# Patient Record
Sex: MALE | Race: WHITE | NOT HISPANIC OR LATINO | Employment: OTHER | URBAN - METROPOLITAN AREA
[De-identification: names, ages, dates, MRNs, and addresses within clinical notes are randomized per-mention and may not be internally consistent; named-entity substitution may affect disease eponyms.]

---

## 2017-01-04 ENCOUNTER — HOSPITAL ENCOUNTER (OUTPATIENT)
Dept: CT IMAGING | Facility: HOSPITAL | Age: 72
Discharge: HOME | End: 2017-01-04
Payer: MEDICARE

## 2017-01-04 DIAGNOSIS — R93.7 ABNORMAL FINDINGS ON DIAGNOSTIC IMAGING OF OTHER PARTS OF MUSCULOSKELETAL SYSTEM: ICD-10-CM

## 2017-01-04 DIAGNOSIS — Z91.81 HISTORY OF FALLING: ICD-10-CM

## 2017-01-04 DIAGNOSIS — R10.9 ABDOMINAL PAIN: ICD-10-CM

## 2017-01-04 PROCEDURE — 74176 CT ABD & PELVIS W/O CONTRAST: CPT

## 2017-01-05 ENCOUNTER — GENERIC CONVERSION - ENCOUNTER (OUTPATIENT)
Dept: OTHER | Facility: OTHER | Age: 72
End: 2017-01-05

## 2017-01-19 ENCOUNTER — ALLSCRIPTS OFFICE VISIT (OUTPATIENT)
Dept: OTHER | Facility: OTHER | Age: 72
End: 2017-01-19

## 2017-01-26 ENCOUNTER — ALLSCRIPTS OFFICE VISIT (OUTPATIENT)
Dept: OTHER | Facility: OTHER | Age: 72
End: 2017-01-26

## 2017-02-10 ENCOUNTER — APPOINTMENT (OUTPATIENT)
Dept: LAB | Facility: CLINIC | Age: 72
End: 2017-02-10
Payer: MEDICARE

## 2017-02-10 DIAGNOSIS — R73.09 OTHER ABNORMAL GLUCOSE: ICD-10-CM

## 2017-02-10 DIAGNOSIS — E78.5 HYPERLIPIDEMIA: ICD-10-CM

## 2017-02-10 LAB
ALBUMIN SERPL BCP-MCNC: 4.3 G/DL (ref 3.5–5)
ALP SERPL-CCNC: 59 U/L (ref 46–116)
ALT SERPL W P-5'-P-CCNC: 26 U/L (ref 12–78)
ANION GAP SERPL CALCULATED.3IONS-SCNC: 6 MMOL/L (ref 4–13)
AST SERPL W P-5'-P-CCNC: 20 U/L (ref 5–45)
BILIRUB SERPL-MCNC: 0.42 MG/DL (ref 0.2–1)
BUN SERPL-MCNC: 20 MG/DL (ref 5–25)
CALCIUM SERPL-MCNC: 9.1 MG/DL (ref 8.3–10.1)
CHLORIDE SERPL-SCNC: 102 MMOL/L (ref 100–108)
CHOLEST SERPL-MCNC: 197 MG/DL (ref 50–200)
CO2 SERPL-SCNC: 30 MMOL/L (ref 21–32)
CREAT SERPL-MCNC: 1.08 MG/DL (ref 0.6–1.3)
EST. AVERAGE GLUCOSE BLD GHB EST-MCNC: 128 MG/DL
GFR SERPL CREATININE-BSD FRML MDRD: >60 ML/MIN/1.73SQ M
GLUCOSE SERPL-MCNC: 94 MG/DL (ref 65–140)
HBA1C MFR BLD: 6.1 % (ref 4.2–6.3)
HDLC SERPL-MCNC: 51 MG/DL (ref 40–60)
LDLC SERPL CALC-MCNC: 125 MG/DL (ref 0–100)
POTASSIUM SERPL-SCNC: 4.4 MMOL/L (ref 3.5–5.3)
PROT SERPL-MCNC: 8 G/DL (ref 6.4–8.2)
SODIUM SERPL-SCNC: 138 MMOL/L (ref 136–145)
TRIGL SERPL-MCNC: 104 MG/DL

## 2017-02-10 PROCEDURE — 36415 COLL VENOUS BLD VENIPUNCTURE: CPT

## 2017-02-10 PROCEDURE — 80061 LIPID PANEL: CPT

## 2017-02-10 PROCEDURE — 83036 HEMOGLOBIN GLYCOSYLATED A1C: CPT

## 2017-02-10 PROCEDURE — 80053 COMPREHEN METABOLIC PANEL: CPT

## 2017-02-14 ENCOUNTER — ALLSCRIPTS OFFICE VISIT (OUTPATIENT)
Dept: OTHER | Facility: OTHER | Age: 72
End: 2017-02-14

## 2017-06-01 DIAGNOSIS — I10 ESSENTIAL (PRIMARY) HYPERTENSION: ICD-10-CM

## 2017-06-01 DIAGNOSIS — R73.09 OTHER ABNORMAL GLUCOSE: ICD-10-CM

## 2017-06-01 DIAGNOSIS — E78.5 HYPERLIPIDEMIA: ICD-10-CM

## 2017-06-06 ENCOUNTER — APPOINTMENT (OUTPATIENT)
Dept: LAB | Facility: CLINIC | Age: 72
End: 2017-06-06
Payer: MEDICARE

## 2017-06-06 DIAGNOSIS — I10 ESSENTIAL (PRIMARY) HYPERTENSION: ICD-10-CM

## 2017-06-06 DIAGNOSIS — E78.5 HYPERLIPIDEMIA: ICD-10-CM

## 2017-06-06 DIAGNOSIS — R73.09 OTHER ABNORMAL GLUCOSE: ICD-10-CM

## 2017-06-06 LAB
ALBUMIN SERPL BCP-MCNC: 4.2 G/DL (ref 3.5–5)
ALP SERPL-CCNC: 66 U/L (ref 46–116)
ALT SERPL W P-5'-P-CCNC: 24 U/L (ref 12–78)
ANION GAP SERPL CALCULATED.3IONS-SCNC: 9 MMOL/L (ref 4–13)
AST SERPL W P-5'-P-CCNC: 18 U/L (ref 5–45)
BASOPHILS # BLD AUTO: 0.03 THOUSANDS/ΜL (ref 0–0.1)
BASOPHILS NFR BLD AUTO: 1 % (ref 0–1)
BILIRUB SERPL-MCNC: 0.47 MG/DL (ref 0.2–1)
BUN SERPL-MCNC: 20 MG/DL (ref 5–25)
CALCIUM SERPL-MCNC: 9.4 MG/DL (ref 8.3–10.1)
CHLORIDE SERPL-SCNC: 98 MMOL/L (ref 100–108)
CO2 SERPL-SCNC: 30 MMOL/L (ref 21–32)
CREAT SERPL-MCNC: 1.15 MG/DL (ref 0.6–1.3)
EOSINOPHIL # BLD AUTO: 0.33 THOUSAND/ΜL (ref 0–0.61)
EOSINOPHIL NFR BLD AUTO: 6 % (ref 0–6)
ERYTHROCYTE [DISTWIDTH] IN BLOOD BY AUTOMATED COUNT: 13 % (ref 11.6–15.1)
EST. AVERAGE GLUCOSE BLD GHB EST-MCNC: 126 MG/DL
GFR SERPL CREATININE-BSD FRML MDRD: >60 ML/MIN/1.73SQ M
GLUCOSE P FAST SERPL-MCNC: 89 MG/DL (ref 65–99)
HBA1C MFR BLD: 6 % (ref 4.2–6.3)
HCT VFR BLD AUTO: 42.1 % (ref 36.5–49.3)
HGB BLD-MCNC: 14 G/DL (ref 12–17)
LYMPHOCYTES # BLD AUTO: 1.83 THOUSANDS/ΜL (ref 0.6–4.47)
LYMPHOCYTES NFR BLD AUTO: 31 % (ref 14–44)
MCH RBC QN AUTO: 32.1 PG (ref 26.8–34.3)
MCHC RBC AUTO-ENTMCNC: 33.3 G/DL (ref 31.4–37.4)
MCV RBC AUTO: 97 FL (ref 82–98)
MONOCYTES # BLD AUTO: 0.51 THOUSAND/ΜL (ref 0.17–1.22)
MONOCYTES NFR BLD AUTO: 9 % (ref 4–12)
NEUTROPHILS # BLD AUTO: 3.24 THOUSANDS/ΜL (ref 1.85–7.62)
NEUTS SEG NFR BLD AUTO: 53 % (ref 43–75)
NRBC BLD AUTO-RTO: 0 /100 WBCS
PLATELET # BLD AUTO: 301 THOUSANDS/UL (ref 149–390)
PMV BLD AUTO: 11.2 FL (ref 8.9–12.7)
POTASSIUM SERPL-SCNC: 4.1 MMOL/L (ref 3.5–5.3)
PROT SERPL-MCNC: 7.6 G/DL (ref 6.4–8.2)
RBC # BLD AUTO: 4.36 MILLION/UL (ref 3.88–5.62)
SODIUM SERPL-SCNC: 137 MMOL/L (ref 136–145)
TSH SERPL DL<=0.05 MIU/L-ACNC: 2.18 UIU/ML (ref 0.36–3.74)
WBC # BLD AUTO: 5.96 THOUSAND/UL (ref 4.31–10.16)

## 2017-06-06 PROCEDURE — 83036 HEMOGLOBIN GLYCOSYLATED A1C: CPT

## 2017-06-06 PROCEDURE — 85025 COMPLETE CBC W/AUTO DIFF WBC: CPT

## 2017-06-06 PROCEDURE — 36415 COLL VENOUS BLD VENIPUNCTURE: CPT

## 2017-06-06 PROCEDURE — 84443 ASSAY THYROID STIM HORMONE: CPT

## 2017-06-06 PROCEDURE — 80053 COMPREHEN METABOLIC PANEL: CPT

## 2017-06-14 ENCOUNTER — ALLSCRIPTS OFFICE VISIT (OUTPATIENT)
Dept: OTHER | Facility: OTHER | Age: 72
End: 2017-06-14

## 2017-10-02 DIAGNOSIS — E78.5 HYPERLIPIDEMIA: ICD-10-CM

## 2017-10-02 DIAGNOSIS — R73.03 PREDIABETES: ICD-10-CM

## 2017-10-05 ENCOUNTER — APPOINTMENT (OUTPATIENT)
Dept: LAB | Facility: CLINIC | Age: 72
End: 2017-10-05
Payer: MEDICARE

## 2017-10-05 DIAGNOSIS — R73.03 PREDIABETES: ICD-10-CM

## 2017-10-05 DIAGNOSIS — E78.5 HYPERLIPIDEMIA: ICD-10-CM

## 2017-10-05 LAB
ANION GAP SERPL CALCULATED.3IONS-SCNC: 5 MMOL/L (ref 4–13)
BUN SERPL-MCNC: 19 MG/DL (ref 5–25)
CALCIUM SERPL-MCNC: 8.8 MG/DL (ref 8.3–10.1)
CHLORIDE SERPL-SCNC: 105 MMOL/L (ref 100–108)
CHOLEST SERPL-MCNC: 165 MG/DL (ref 50–200)
CO2 SERPL-SCNC: 29 MMOL/L (ref 21–32)
CREAT SERPL-MCNC: 1.11 MG/DL (ref 0.6–1.3)
GFR SERPL CREATININE-BSD FRML MDRD: 66 ML/MIN/1.73SQ M
GLUCOSE P FAST SERPL-MCNC: 90 MG/DL (ref 65–99)
HDLC SERPL-MCNC: 46 MG/DL (ref 40–60)
LDLC SERPL CALC-MCNC: 91 MG/DL (ref 0–100)
POTASSIUM SERPL-SCNC: 4.2 MMOL/L (ref 3.5–5.3)
SODIUM SERPL-SCNC: 139 MMOL/L (ref 136–145)
TRIGL SERPL-MCNC: 140 MG/DL

## 2017-10-05 PROCEDURE — 80048 BASIC METABOLIC PNL TOTAL CA: CPT

## 2017-10-05 PROCEDURE — 83036 HEMOGLOBIN GLYCOSYLATED A1C: CPT

## 2017-10-05 PROCEDURE — 80061 LIPID PANEL: CPT

## 2017-10-05 PROCEDURE — 36415 COLL VENOUS BLD VENIPUNCTURE: CPT

## 2017-10-06 LAB
EST. AVERAGE GLUCOSE BLD GHB EST-MCNC: 134 MG/DL
HBA1C MFR BLD: 6.3 % (ref 4.2–6.3)

## 2017-10-10 ENCOUNTER — ALLSCRIPTS OFFICE VISIT (OUTPATIENT)
Dept: OTHER | Facility: OTHER | Age: 72
End: 2017-10-10

## 2017-10-11 NOTE — PROGRESS NOTES
Assessment  1  Obesity (278 00) (E66 9)   2  Hypertension (401 9) (I10)   3  Hyperlipidemia (272 4) (E78 5)   4  GERD (gastroesophageal reflux disease) (530 81) (K21 9)   5  Prediabetes (790 29) (R73 03)    Plan  GERD (gastroesophageal reflux disease)    · Omeprazole 20 MG Oral Capsule Delayed Release; TAKE ONE CAPSULE BY MOUTH EVERY  DAY  Hypertension    · (1) BASIC METABOLIC PROFILE; Status:Active; Requested for:01Feb2018;   Need for hepatitis C screening test    · (Q) HEPATITIS C ANTIBODY; Status:Active; Requested for:10Oct2017;   Need for prophylactic vaccination and inoculation against influenza    · Fluzone High-Dose 0 5 ML Intramuscular Suspension Prefilled Syringe  Prediabetes    · (1) HEMOGLOBIN A1C; Status:Active; Requested for:01Feb2018; Discussion/Summary  Discussion Summary:   Cough  No improvement with steroid nasal spray, suspect GERD  Start low dose PPI qHS, discussed diet  Prediabetes, obesity  A1c worse, limit carbs, portion control  HTN  BP stable on lisinopril  Hyperlipidemia  LDL improved, no dietary or medication changed  Menieres, occasional dizziness  On daily diuretics  s/p bursa removal HM  Flu vaccine today  Repeat colonoscopy age [de-identified]   up in 4 months or prn  Counseling Documentation With Imm: The patient was counseled regarding diagnostic results,-instructions for management,-risk factor reductions,-impressions  Chief Complaint  Chief Complaint Chronic Condition [facility]: Patient is here today for follow up of chronic conditions described in HPI  History of Present Illness  HPI: Mr Greer Due complains of a cough, worse at night  would sometimes wake him up  He tried elevating the pillows, still has a non productive cough  Minimal symptoms during the day  He used the nasal spray at night which did not help  Denies any post nasal drip, nasal congestion or wheezing  has the thick bursa removed from his right knee   This has healed well has been more active this summer, busy every day     Obesity (Follow-Up): The patient is being seen for follow-up of obesity  The patient reports no change in the condition  Interval symptoms:  stable poor eating habits,-denies dyspnea,-denies fatigue-and-denies back pain  The patient is not currently on medication for this problem  Disease management:  the patient is not doing well with his goals  Gastroesophageal Reflux Disease (Brief): The patient is being seen for an initial evaluation of gastroesophageal reflux disease  Symptoms:  no heartburn,-no epigastric pain-and-no abdominal pain  The patient is currently experiencing symptoms  Associated symptoms:  cough, but-no hoarseness-and-no wheezing  Hyperlipidemia (Follow-Up): The patient states his hyperlipidemia has been under good control since the last visit  Comorbid Illnesses: hypertension  Symptoms: The patient is currently asymptomatic  Medications: the patient is adherent with his medication regimen  The patient is doing well with his hyperlipidemia goals  Review of Systems  Complete-Male:   Constitutional: not feeling poorly-and-not feeling tired  Eyes: no eyesight problems  Cardiovascular: no chest pain,-no palpitations-and-no extremity edema  Respiratory: cough, but-as noted in HPI,-no shortness of breath-and-no wheezing  Gastrointestinal: no abdominal pain-and-no constipation  Genitourinary: nocturia-and-1-2x at night, but-no urinary hesitancy  Musculoskeletal: no arthralgias  Integumentary: as noted in HPI,-no rashes-and-no skin wound  Neurological: no headache-and-no dizziness  Psychiatric: no sleep disturbances  Endocrine: no feelings of weakness  Active Problems  1  Abnormal x-ray of spine (793 7) (R93 7)   2  Colonoscopy (Fiberoptic) Screening   3  Cough (786 2) (R05)   4  Essential hypertriglyceridemia (272 1) (E78 1)   5  Hemorrhoids (455 6) (K64 9)   6  History of fall (V15 88) (Z91 81)   7  Hyperlipidemia (272 4) (E78 5)   8   Hypertension (401 9) (I10)   9  MÃ©niÃ¨re's disease (386 00) (H81 09)   10  Need for chickenpox vaccination (V05 4) (Z23)   11  Need for pneumococcal vaccination (V03 82) (Z23)   12  Need for prophylactic vaccination and inoculation against influenza (V04 81) (Z23)   13  Obesity (278 00) (E66 9)   14  Prediabetes (790 29) (R73 03)   15  Screening for neurological condition (V80 09) (Z13 89)   16  Skin lesion (709 9) (L98 9)   17  Special screening examination for neoplasm of prostate (V76 44) (Z12 5)   18  Special screening for malignant neoplasm of colon (V76 51) (Z12 11)    Past Medical History  1  History of Bronchitis, asthmatic (493 90) (J45 909)   2  History of Diverticulosis (562 10) (K57 90)   3  History of fall (V15 88) (Z91 81)   4  History of low back pain (V13 59) (Z87 39)   5  History of right flank pain (V13 89) (Z87 898)   6  History of sebaceous cyst (V13 3) (Z87 2)   7  History of Impacted cerumen of both ears (380 4) (H61 23)   8  History of Internal Hemorrhoids (455 0)   9  History of Lower Leg Localized Swelling Unilateral (729 81)   10  Need for chickenpox vaccination (V05 4) (Z23)   11  History of Pneumonia (V12 61)   12  History of Strain of lumbar region, initial encounter (847 2) (C41 397L)  Active Problems And Past Medical History Reviewed: The active problems and past medical history were reviewed and updated today  Surgical History  1  History of Enteroscopic Polypectomy   2  History of Excision Of Prepatellar Bursa   3  History of Nose Surgery   4  History of Tonsillectomy    Family History  Mother    1  Family history of Acute Myocardial Infarction (V17 3)   2  Denied: Family history of Alcoholism and drug addiction in family   3  Denied: Family history of Anxiety and depression   4  Denied: Family history of Colon cancer   5  Denied: Family history of Crohn's disease without complication, unspecified gastrointestinal tract   location   6  Denied: Family history of liver disease   7   Family history of Mother  At Age 78  Father    6  Denied: Family history of Alcoholism and drug addiction in family   5  Denied: Family history of Anxiety and depression   10  Denied: Family history of Colon cancer   11  Denied: Family history of Crohn's disease without complication, unspecified gastrointestinal tract    location   15  Denied: Family history of liver disease   15  Family history of Father  At Age 80   17  Family history of Septicemia  Child    13  Denied: Family history of Alcoholism and drug addiction in family   12  Denied: Family history of Anxiety and depression  Sibling    16  Denied: Family history of Alcoholism and drug addiction in family   25  Denied: Family history of Anxiety and depression    Social History   · Being A Social Drinker   · Marital History - Currently    · Never A Smoker   · Occupation: Retired   · Denied: History of Using Intravenous Drugs  Social History Reviewed: The social history was reviewed and is unchanged  Current Meds   1  Aspirin 81 MG TABS; take 2 tablet daily; Therapy: 23KNV2365 to (Evaluate:31Gzi2302); Last Rx:23Fhn5015 Ordered   2  Fish Oil 1200 MG Oral Capsule; Take 1 capsule twice daily; Therapy: 44SZO9494 to (Evaluate:2013); Last Rx:2013 Ordered   3  Fluticasone Propionate 50 MCG/ACT Nasal Suspension; Use 1-2 sprays in each nostril once daily; Therapy: 70LBV4742 to (Last Rx:2017)  Requested for: 13TAA0875 Ordered   4  Gemfibrozil 600 MG Oral Tablet; TAKE 1 TABLET TWICE DAILY; Therapy: 99CCK2589 to (Laura Hurley)  Requested for: 25JXG6611; Last Rx:55Buj3802   Ordered   5  Lisinopril 10 MG Oral Tablet; TAKE 1 TABLET DAILY; Therapy: 63HMS3779 to (Evaluate:2018)  Requested for: 01XEB5565; Last Rx:29Txu0692   Ordered   6  Multi-Vitamin Oral Tablet; Take 1 tablet daily; Therapy: 97UHH3872 to (Last Rx:01Aqv5145) Ordered   7  Pravastatin Sodium 10 MG Oral Tablet; TAKE 1 TABLET DAILY AS DIRECTED;    Therapy: 43TDG9085 to (Evaluate:67Qpr6125)  Requested for: 39OFX9941; Last Rx:96Izr6776   Ordered   8  Triamterene-HCTZ 37 5-25 MG Oral Capsule; TAKE 1 CAPSULE Daily; Therapy: 00GQI7036 to ()  Requested for: 85Hzj8356; Last Rx:05Jjc3864   Ordered  Medication List Reviewed: The medication list was reviewed and updated today  Allergies  1  No Known Drug Allergies    Vitals  Vital Signs    Recorded: 45QVA9302 12:18PM   Temperature 98 1 F   Heart Rate 84   Respiration 18   Systolic 891   Diastolic 76   Height 5 ft 6 in   Weight 232 lb 8 oz   BMI Calculated 37 53   BSA Calculated 2 13   O2 Saturation 97     Physical Exam    Constitutional   General appearance: No acute distress, well appearing and well nourished  appears healthy,-comfortable,-obese,-clothing appropriate-and-well hydrated  Head and Face   Head and face: Normal     Eyes   Pupils and irises: Equal, round, reactive to light  Ears, Nose, Mouth, and Throat   External inspection of ears and nose: Normal     Otoscopic examination: Tympanic membranes translucent with normal light reflex  Canals patent without erythema  Nasal mucosa, septum, and turbinates: Abnormal   no nasal discharge  The bilateral nasal mucosa was red  Oropharynx: Normal with no erythema, edema, exudate or lesions  Neck   Neck: Supple, symmetric, trachea midline, no masses  Pulmonary   Respiratory effort: No increased work of breathing or signs of respiratory distress  Auscultation of lungs: Clear to auscultation  Cardiovascular   Auscultation of heart: Normal rate and rhythm, normal S1 and S2, no murmurs  Examination of extremities for edema and/or varicosities: Normal     Abdomen   Abdomen: Non-tender, no masses  Musculoskeletal   Gait and station: Normal     Skin   Skin and subcutaneous tissue: Normal without rashes or lesions  -well healed wound R knee  Neurologic   Cortical function: Normal mental status      Psychiatric   Orientation to person, place and time: Normal        Results/Data  (1) BASIC METABOLIC PROFILE 84CEL6642 11:20AM [employer] Order Number: [id number]     Test Name Result Flag Reference   SODIUM 139 mmol/L  136-145   POTASSIUM 4 2 mmol/L  3 5-5 3   CHLORIDE 105 mmol/L  100-108   CARBON DIOXIDE 29 mmol/L  21-32   ANION GAP (CALC) 5 mmol/L  4-13   BLOOD UREA NITROGEN 19 mg/dL  5-25   CREATININE 1 11 mg/dL  0 60-1 30   Standardized to IDMS reference method   CALCIUM 8 8 mg/dL  8 3-10 1   eGFR 66 ml/min/1 73sq m     National Kidney Disease Education Program recommendations are as follows:  GFR calculation is accurate only with a steady state creatinine  Chronic Kidney disease less than 60 ml/min/1 73 sq  meters  Kidney failure less than 15 ml/min/1 73 sq  meters  GLUCOSE FASTING 90 mg/dL  65-99   Specimen collection should occur prior to Sulfasalazine administration due to the potential for falsely depressed results  Specimen collection should occur prior to Sulfapyridine administration due to the potential for falsely elevated results  (1) LIPID PANEL, FASTING 99CGK8069 11:20AM [employer] Order Number: [id number]     Test Name Result Flag Reference   CHOLESTEROL 165 mg/dL     HDL,DIRECT 46 mg/dL  40-60   Specimen collection should occur prior to Metamizole administration due to the potential for falsley depressed results  LDL CHOLESTEROL CALCULATED 91 mg/dL  0-100   Triglyceride:        Normal <150 mg/dl   Borderline High 150-199 mg/dl   High 200-499 mg/dl   Very High >499 mg/dl      Cholesterol:       Desirable <200 mg/dl    Borderline High 200-239 mg/dl    High >239 mg/dl      HDL Cholesterol:       High>59 mg/dL    Low <41 mg/dL      This screening LDL is a calculated result  It does not have the accuracy of the Direct Measured LDL in the monitoring of patients with hyperlipidemia and/or statin therapy     Direct Measure LDL (PTK544) must be ordered separately in these patients  TRIGLYCERIDES 140 mg/dL  <=150   Specimen collection should occur prior to N-Acetylcysteine or Metamizole administration due to the potential for falsely depressed results  (1) HEMOGLOBIN A1C 05Oct2017 11:20AM Brian Jono Order Number: [id number]     Test Name Result Flag Reference   HEMOGLOBIN A1C 6 3 %  4 2-6 3   EST  AVG  GLUCOSE 134 mg/dl       Health Management  Special screening for malignant neoplasm of colon   COLONOSCOPY; every 10 years; Last 30EWC2383; Next Due: 84ACB1427;  Active    Future Appointments    Date/Time Provider Specialty Site   02/13/2018 12:30 PM Angi Mckee MD Internal Medicine [facility] INTERNAL MED     Signatures   Electronically signed by : Camilo Bowman MD; Oct 10 2017  3:08PM EST                       (Author)

## 2017-12-04 ENCOUNTER — GENERIC CONVERSION - ENCOUNTER (OUTPATIENT)
Dept: OTHER | Facility: OTHER | Age: 72
End: 2017-12-04

## 2018-01-04 ENCOUNTER — APPOINTMENT (OUTPATIENT)
Dept: RADIOLOGY | Facility: CLINIC | Age: 73
End: 2018-01-04
Payer: MEDICARE

## 2018-01-04 ENCOUNTER — TRANSCRIBE ORDERS (OUTPATIENT)
Dept: RADIOLOGY | Facility: CLINIC | Age: 73
End: 2018-01-04

## 2018-01-04 DIAGNOSIS — R05.9 COUGH: ICD-10-CM

## 2018-01-04 PROCEDURE — 71046 X-RAY EXAM CHEST 2 VIEWS: CPT

## 2018-01-05 ENCOUNTER — GENERIC CONVERSION - ENCOUNTER (OUTPATIENT)
Dept: OTHER | Facility: OTHER | Age: 73
End: 2018-01-05

## 2018-01-10 NOTE — PROGRESS NOTES
Chief Complaint  patient is here for a BP check  pt is to continue the lisinopril and keep his appt next month per Dr Michael Miramontes  pt notified      Active Problems    1  Abnormal x-ray of spine (793 7) (R93 7)   2  Bronchitis, asthmatic (493 90) (J45 909)   3  Colonoscopy (Fiberoptic) Screening   4  Ear pain, left (388 70) (H92 02)   5  Essential hypertriglyceridemia (272 1) (E78 1)   6  Hemorrhoids (455 6) (K64 9)   7  History of fall (V15 88) (Z91 81)   8  Hyperlipidemia (272 4) (E78 5)   9  Hypertension (401 9) (I10)   10  MeniÃ¨re's disease (386 00) (H81 09)   11  Need for chickenpox vaccination (V05 4) (Z23)   12  Need for pneumococcal vaccination (V03 82) (Z23)   13  Need for prophylactic vaccination and inoculation against influenza (V04 81) (Z23)   14  Obesity (278 00) (E66 9)   15  Prediabetes (790 29) (R73 09)   16  Right flank pain (789 09) (R10 9)   17  Screening for depression (V79 0) (Z13 89)   18  Screening for genitourinary condition (V81 6) (Z13 89)   19  Screening for neurological condition (V80 09) (Z13 89)   20  Skin lesion (709 9) (L98 9)   21  Special screening examination for neoplasm of prostate (V76 44) (Z12 5)   22  Special screening for malignant neoplasm of colon (V76 51) (Z12 11)   23  Strain of lumbar region, initial encounter (847 2) (S39 012A)    Current Meds   1  Acetaminophen-Codeine #3 300-30 MG Oral Tablet; TAKE 1 TABLET EVERY 6 TO 8   HOURS AS NEEDED FOR PAIN, MAY CAUSE DROWSINESS; Therapy: 60Tru8579 to (Last Rx:26Dde6219) Ordered   2  Aspirin 81 MG TABS; take 2 tablet daily; Therapy: 18SFK0581 to (Evaluate:37Qkc3176); Last Rx:92Hbg4812 Ordered   3  Fish Oil 1200 MG Oral Capsule; Take 1 capsule twice daily; Therapy: 43FIM8694 to (Evaluate:34Eyg0635); Last Rx:39Hlo6595 Ordered   4  Gemfibrozil 600 MG Oral Tablet; TAKE 1 TABLET TWICE DAILY; Therapy: 96PIM7904 to (Evaluate:87Lmn6902)  Requested for: 11WIE2252; Last   Rx:64Ncq6156 Ordered   5   Lisinopril 10 MG Oral Tablet; TAKE 1 TABLET DAILY; Therapy: 71KMX6818 to (Evaluate:20Mar2017)  Requested for: 34NOW6730; Last   Rx:19Jan2017 Ordered   6  Multi-Vitamin Oral Tablet; Take 1 tablet daily; Therapy: 60JPM8762 to (Last Rx:60Yen7449) Ordered   7  Pravastatin Sodium 10 MG Oral Tablet; TAKE 1 TABLET DAILY AS DIRECTED; Therapy: 08MZH9844 to (Evaluate:78Kia3620)  Requested for: 14JXO9904; Last   Rx:19Jan2017 Ordered   8  Triamterene-HCTZ 37 5-25 MG Oral Capsule; TAKE 1 CAPSULE Daily; Therapy: 63JZO5549 to (Evaluate:22Jan2017)  Requested for: 22Rxe7631; Last   Rx:25Czp3138 Ordered    Allergies    1   No Known Drug Allergies    Vitals  Signs    Heart Rate: 74  Systolic: 851  Diastolic: 78   Systolic: 165  Diastolic: 78    Future Appointments    Date/Time Provider Specialty Site   02/14/2017 01:30 PM Frank Mckee MD Internal Medicine [facility] INTERNAL MED     Signatures   Electronically signed by : Deo Watson MD; Jan 26 2017  5:23PM EST                       (Author)

## 2018-01-12 NOTE — RESULT NOTES
Message   CT scan test results are back and there is no kidney stone  There are several small gallstones in gallbladder which was probably what radiologist saw on the previous x-rays  if patient is feeling better from back pain, no further work up is recommended at this time  Let me know, thanks     Verified Results  CT RENAL STONE STUDY ABDOMEN PELVIS WO CONTRAST 66LTI8668 07:23PM Gregorio Davila Order Number: [id number]   Performing Comments: 69 y/o M s/p recent fall and right flank pain - l-spine series shows right sided 6mm kidney stone   *please complete KIDNEY STONE protocol CT/NO IV CONTRAST, thanks*   - Patient Instructions: To schedule this appointment, please contact Central Scheduling at [phone]  Test Name Result Flag Reference   CT RENAL STONE STUDY ABDOMEN PELVIS WO CONTRAST (Report)     CT ABDOMEN AND PELVIS WITHOUT IV CONTRAST - LOW DOSE RENAL STONE      INDICATION: Right-sided flank pain  Right renal calculus suspected on prior lumbar spine x-rays      COMPARISON: Lumbar spine x-rays 12/28/2016, no prior CT studies     TECHNIQUE: Low dose thin section CT examination of the abdomen and pelvis was performed without intravenous or oral contrast according to a protocol specifically designed to evaluate for urinary tract calculus  Axial, sagittal and coronal reformatted    images were submitted for interpretation  This examination, like all CT scans performed in the [facility], was performed utilizing techniques to minimize radiation dose exposure, including the use of iterative reconstruction and    automated exposure control  Evaluation for pathology in the abdomen and pelvis that is unrelated to urinary tract calculi is limited  FINDINGS:     RIGHT KIDNEY AND URETER:   No urinary tract calculi  No hydronephrosis or hydroureter  No perinephric collection  LEFT KIDNEY AND URETER:   No urinary tract calculi  No hydronephrosis or hydroureter   No perinephric collection  URINARY BLADDER:   Unremarkable  No significant abnormality in the visualized lung bases  Limited low radiation dose noncontrast CT evaluation demonstrates no clinically significant abnormality of liver, spleen, pancreas, or adrenal glands  There are multiple small gallstones, one of these stones likely accounts for the calcification seen on the x-ray study  There is no pericholecystic inflammatory change, gallbladder distention or ductal dilatation   No bowel obstruction  No ascites or lymphadenopathy  Limited evaluation demonstrates no evidence to suggest acute appendicitis  No acute fracture or destructive osseous lesion is identified  IMPRESSION:       1  Cholelithiasis  This likely accounts for the calcification seen on the prior lumbar spine x-rays  No evidence of acute cholecystitis   2  There are no urinary tract calculi  There is no hydronephrosis          Workstation performed: [id number]     Signed by:   Fab Shepherd MD   1/5/17

## 2018-01-13 VITALS
RESPIRATION RATE: 18 BRPM | BODY MASS INDEX: 36.96 KG/M2 | HEART RATE: 90 BPM | OXYGEN SATURATION: 98 % | DIASTOLIC BLOOD PRESSURE: 78 MMHG | SYSTOLIC BLOOD PRESSURE: 124 MMHG | TEMPERATURE: 98.9 F | HEIGHT: 66 IN | WEIGHT: 230 LBS

## 2018-01-13 VITALS
HEART RATE: 80 BPM | DIASTOLIC BLOOD PRESSURE: 68 MMHG | OXYGEN SATURATION: 95 % | SYSTOLIC BLOOD PRESSURE: 130 MMHG | BODY MASS INDEX: 37.63 KG/M2 | HEIGHT: 66 IN | WEIGHT: 234.13 LBS | RESPIRATION RATE: 18 BRPM

## 2018-01-13 VITALS
BODY MASS INDEX: 37.37 KG/M2 | SYSTOLIC BLOOD PRESSURE: 126 MMHG | WEIGHT: 232.5 LBS | RESPIRATION RATE: 18 BRPM | DIASTOLIC BLOOD PRESSURE: 76 MMHG | OXYGEN SATURATION: 97 % | TEMPERATURE: 98.1 F | HEART RATE: 84 BPM | HEIGHT: 66 IN

## 2018-01-13 VITALS — SYSTOLIC BLOOD PRESSURE: 140 MMHG | DIASTOLIC BLOOD PRESSURE: 78 MMHG | HEART RATE: 74 BPM

## 2018-01-13 NOTE — PROGRESS NOTES
Chief Complaint  patient is here for a BP check      Active Problems    1  Abnormal x-ray of spine (793 7) (R93 7)   2  Bronchitis, asthmatic (493 90) (J45 909)   3  Colonoscopy (Fiberoptic) Screening   4  Ear pain, left (388 70) (H92 02)   5  Essential hypertriglyceridemia (272 1) (E78 1)   6  Hemorrhoids (455 6) (K64 9)   7  History of fall (V15 88) (Z91 81)   8  Hyperlipidemia (272 4) (E78 5)   9  Hypertension (401 9) (I10)   10  MeniÃ¨re's disease (386 00) (H81 09)   11  Need for chickenpox vaccination (V05 4) (Z23)   12  Need for pneumococcal vaccination (V03 82) (Z23)   13  Need for prophylactic vaccination and inoculation against influenza (V04 81) (Z23)   14  Obesity (278 00) (E66 9)   15  Prediabetes (790 29) (R73 09)   16  Right flank pain (789 09) (R10 9)   17  Screening for depression (V79 0) (Z13 89)   18  Screening for genitourinary condition (V81 6) (Z13 89)   19  Screening for neurological condition (V80 09) (Z13 89)   20  Skin lesion (709 9) (L98 9)   21  Special screening examination for neoplasm of prostate (V76 44) (Z12 5)   22  Special screening for malignant neoplasm of colon (V76 51) (Z12 11)   23  Strain of lumbar region, initial encounter (847 2) (S39 012A)    Current Meds   1  Acetaminophen-Codeine #3 300-30 MG Oral Tablet; TAKE 1 TABLET EVERY 6 TO 8   HOURS AS NEEDED FOR PAIN, MAY CAUSE DROWSINESS; Therapy: 20Bnk5482 to (Last Rx:87Dpk1429) Ordered   2  Aspirin 81 MG TABS; take 2 tablet daily; Therapy: 86MDW8520 to (Evaluate:68Hvw4033); Last Rx:70Mwe1516 Ordered   3  Fish Oil 1200 MG Oral Capsule; Take 1 capsule twice daily; Therapy: 19HXV3735 to (Evaluate:02Tma0210); Last Rx:00Tbo4034 Ordered   4  Gemfibrozil 600 MG Oral Tablet; TAKE 1 TABLET TWICE DAILY; Therapy: 35KDY4291 to (Evaluate:83Lgu4667)  Requested for: 86KXV4612; Last   Rx:16Nov2016 Ordered   5  Multi-Vitamin Oral Tablet; Take 1 tablet daily; Therapy: 55RJK5604 to (Last Rx:31May2013) Ordered   6   Pravastatin Sodium 10 MG Oral Tablet; TAKE 1 TABLET DAILY AS DIRECTED; Therapy: 88YKA9415 to (Evaluate:22Jan2017)  Requested for: 74Kbr2705; Last   Rx:31Sba9635 Ordered   7  Triamterene-HCTZ 37 5-25 MG Oral Capsule; TAKE 1 CAPSULE Daily; Therapy: 99ZAU8555 to (Evaluate:22Jan2017)  Requested for: 99Sqv9733; Last   Rx:01Pnt0534 Ordered    Allergies    1  No Known Drug Allergies    Vitals  Signs    Heart Rate: 78  Systolic: 982  Diastolic: 86   Systolic: 237  Diastolic: 90    Plan  Hyperlipidemia    · Pravastatin Sodium 10 MG Oral Tablet; TAKE 1 TABLET DAILY AS DIRECTED  Hypertension    · Lisinopril 10 MG Oral Tablet; TAKE 1 TABLET DAILY    Discussion/Summary    Repeat BP remains elevated  start lisinopril 10 mg  BP check in 1 week        Future Appointments    Date/Time Provider Specialty Site   02/01/2017 01:30 PM Davis Mckee MD Internal Medicine [facility] INTERNAL MED   02/14/2017 01:30 PM Davis Mckee MD Internal Medicine [address] [place] [address] INTERNAL [facility]     Signatures   Electronically signed by : Renetta Mckeon MD; Jan 19 2017 10:36AM EST                       (Author)

## 2018-01-14 NOTE — RESULT NOTES
Verified Results  (1) COMPREHENSIVE METABOLIC PANEL 64ICT1355 31:95YZ Melissa Mckeenareshhi 56 Kidney Disease Education Program recommendations are as follows:  GFR calculation is accurate only with a steady state creatinine  Chronic Kidney disease less than 60 ml/min/1 73 sq  meters  Kidney failure less than 15 ml/min/1 73 sq  meters  Test Name Result Flag Reference   GLUCOSE,RANDM 100 mg/dL     SODIUM 139 mmol/L  136-145   POTASSIUM 4 6 mmol/L  3 5-5 3   CHLORIDE 102 mmol/L  100-108   CARBON DIOXIDE 31 mmol/L  21-32   ANION GAP (CALC) 6 mmol/L  4-13   BLOOD UREA NITROGEN 18 mg/dL  5-25   CREATININE 0 97 mg/dL  0 60-1 30   Standardized to IDMS reference method   CALCIUM 8 9 mg/dL  8 3-10 1   BILI, TOTAL 0 60 mg/dL  0 20-1 00   ALK PHOSPHATAS 61 U/L     ALT (SGPT) 27 U/L  12-78   AST(SGOT) 19 U/L  5-45   ALBUMIN 4 4 g/dL  3 5-5 0   TOTAL PROTEIN 7 3 g/dL  6 4-8 2   eGFR Non-African American      >60 0 ml/min/1 73sq m     (1) LIPID PANEL, FASTING 22Jan2016 10:49AM Shruthi Henry   Triglyceride:         Normal              <150 mg/dl       Borderline High    150-199 mg/dl       High               200-499 mg/dl       Very High          >499 mg/dl  Cholesterol:         Desirable        <200 mg/dl      Borderline High  200-239 mg/dl      High             >239 mg/dl  HDL Cholesterol:        High    >59 mg/dL      Low     <41 mg/dL  LDL CALCULATED:    This screening LDL is a calculated result  It does not have the accuracy of the Direct Measured LDL in the monitoring of patients with hyperlipidemia and/or statin therapy  Direct Measure LDL (AIU207) must be ordered separately in these patients       Test Name Result Flag Reference   CHOLESTEROL 175 mg/dL     HDL,DIRECT 43 mg/dL  40-60   LDL CHOLESTEROL CALCULATED 101 mg/dL H 0-100   TRIGLYCERIDES 157 mg/dL H <=150     (1) HEMOGLOBIN A1C 22Jan2016 10:49AM Louis Mckee   5 7-6 4% impaired fasting glucose  >=6 5% diagnosis of diabetes    Falsely low levels are seen in conditions linked to short RBC life span-  hemolytic anemia, and splenomegaly  Falsely elevated levels are seen in situations where there is an increased production of RBC- receipt of erythropoietin or blood transfusions  Adopted from ADA-Clinical Practice Recommendations     Test Name Result Flag Reference   HEMOGLOBIN A1C 6 0 % H 4 0-5 6   EST  AVG   GLUCOSE 126 mg/dl

## 2018-01-14 NOTE — MISCELLANEOUS
Message  called and spoke to patient    reviewed L-spine x-rays with patient over phone    patient has no hx of kidney stones in past & is urinating fine, no dysuria or hematuria  has been having right flank pain/spasms since fall    no other complaints    plan - CT renal stone protocol ordered   f/u after CT   precautions given during OV           Plan  Abnormal x-ray of spine, History of fall, Right flank pain    · CT RENAL STONE STUDY ABDOMEN PELVIS WO CONTRAST; Status:Hold For -  Scheduling; Requested for:28Dec2016; Abnormal x-ray of spine, Right flank pain    · CT RENAL PROTOCOL; Status:Canceled - Scheduling;     Verified Results  * XR SPINE LUMBAR 2 OR 3 VIEWS INJURY 28Dec2016 12:56PM Tahira Austin    Order Number: [id number]   Performing Comments: 71 y/o M hx of fall down steps - c/o low back pain - r/o bony injury     Test Name Result Flag Reference   XR SPINE LUMBAR 2 OR 3 VIEWS (Report)     LUMBAR SPINE     INDICATION: Patient fell  Right side lower back pain  COMPARISON: None     VIEWS: AP and lateral; 3 images     FINDINGS: No significant scoliosis  Normal lordosis  Grade 1 retrolisthesis L2 on L3 and L3 on L4  Grade 1 anterolisthesis L4 on L5  Anterior syndesmophytes at T12-L1 and L1-L2  Multilevel facet degenerative changes  Degenerative disc disease most pronounced at L5-S1  There is no radiographic evidence of acute fracture or destructive osseous lesion  6 mm calcification in the right midabdomen, possibly in the right kidney or right renal pelvis/proximal ureter? Visualized soft tissues appear unremarkable  IMPRESSION:   Multilevel degenerative changes  No acute fracture or dislocation is seen  Grade 1 retrolisthesis L2 on L3 and L3 on L4  Grade 1 anterolisthesis of L4 on L5    6 mm calcification in the right abdomen, possibly in the right renal collecting system, right renal pelvis, or potentially in the right ureter   If there is any evidence for renal colic: Suggest renal stone protocol CT       Workstation performed: [id number]     Signed by:    Erlinda Avalos DO   12/28/16       Signatures   Electronically signed by : Minoo Brambila DO; Dec 28 2016  6:45PM EST                       (Author)

## 2018-01-15 NOTE — RESULT NOTES
Verified Results  (1) CBC/PLT/DIFF 25Apr2016 11:24AM [facility] Order Number: [id number]    TW Order Number: [id number]     Test Name Result Flag Reference   WBC COUNT 4 90 Thousand/uL  4 31-10 16   RBC COUNT 4 88 Million/uL  3 88-5 62   HEMOGLOBIN 14 9 g/dL  12 0-17 0   HEMATOCRIT 45 8 %  36 5-49 3   MCV 94 fL  82-98   MCH 30 5 pg  26 8-34 3   MCHC 32 5 g/dL  31 4-37 4   RDW 13 0 %  11 6-15 1   MPV 11 0 fL  8 9-12 7   PLATELET COUNT 497 Thousands/uL  149-390   nRBC AUTOMATED 0 /100 WBCs     NEUTROPHILS RELATIVE PERCENT 52 %  43-75   LYMPHOCYTES RELATIVE PERCENT 36 %  14-44   MONOCYTES RELATIVE PERCENT 8 %  4-12   EOSINOPHILS RELATIVE PERCENT 4 %  0-6   BASOPHILS RELATIVE PERCENT 0 %  0-1   NEUTROPHILS ABSOLUTE COUNT 2 50 Thousands/µL  1 85-7 62   LYMPHOCYTES ABSOLUTE COUNT 1 75 Thousands/µL  0 60-4 47   MONOCYTES ABSOLUTE COUNT 0 41 Thousand/µL  0 17-1 22   EOSINOPHILS ABSOLUTE COUNT 0 20 Thousand/µL  0 00-0 61   BASOPHILS ABSOLUTE COUNT 0 02 Thousands/µL  0 00-0 10     (1) COMPREHENSIVE METABOLIC PANEL [id number] 51:17FS [facility] Order Number: [id number]     Order Number: [id number] Order Number: [id number] Order Number: [id number]  National Kidney Disease Education Program recommendations are as follows:  GFR calculation is accurate only with a steady state creatinine  Chronic Kidney disease less than 60 ml/min/1 73 sq  meters  Kidney failure less than 15 ml/min/1 73 sq  meters  Test Name Result Flag Reference   GLUCOSE,RANDM 98 mg/dL     If the patient is fasting, the ADA then defines impaired fasting glucose as > 100 mg/dL and diabetes as > or equal to 123 mg/dL     SODIUM 141 mmol/L  136-145   POTASSIUM 4 4 mmol/L  3 5-5 3   CHLORIDE 104 mmol/L  100-108   CARBON DIOXIDE 30 mmol/L  21-32   ANION GAP (CALC) 7 mmol/L  4-13   BLOOD UREA NITROGEN 19 mg/dL  5-25   CREATININE 0 96 mg/dL  0 60-1 30   Standardized to IDMS reference method   CALCIUM 8 7 mg/dL 8  3-10 1   BILI, TOTAL 0 41 mg/dL  0 20-1 00   ALK PHOSPHATAS 57 U/L     ALT (SGPT) 41 U/L  12-78   AST(SGOT) 29 U/L  5-45   ALBUMIN 4 2 g/dL  3 5-5 0   TOTAL PROTEIN 7 2 g/dL  6 4-8 2   eGFR Non-African American      >60 0 ml/min/1 73sq m     (1) HEMOGLOBIN A1C 25Apr2016 11:24AM Vane Salvage   TW Order Number: [id number]      5 7-6 4% impaired fasting glucose  >=6 5% diagnosis of diabetes    Falsely low levels are seen in conditions linked to short RBC life span-  hemolytic anemia, and splenomegaly  Falsely elevated levels are seen in situations where there is an increased production of RBC- receipt of erythropoietin or blood transfusions  Adopted from ADA-Clinical Practice Recommendations     Test Name Result Flag Reference   HEMOGLOBIN A1C 6 4 % H 4 0-5 6   EST  AVG  GLUCOSE 137 mg/dl       (1) LIPID PANEL, FASTING 25Apr2016 11:24AM Vane Salvage   TW Order Number: [id number]    TW Order Number: [id number] Order Number: [id number] Order Number: [id number]  Triglyceride:         Normal              <150 mg/dl       Borderline High    150-199 mg/dl       High               200-499 mg/dl       Very High          >499 mg/dl  Cholesterol:         Desirable        <200 mg/dl      Borderline High  200-239 mg/dl      High             >239 mg/dl  HDL Cholesterol:        High    >59 mg/dL      Low     <41 mg/dL  LDL CALCULATED:    This screening LDL is a calculated result  It does not have the accuracy of the Direct Measured LDL in the monitoring of patients with hyperlipidemia and/or statin therapy  Direct Measure LDL (YJN300) must be ordered separately in these patients  Test Name Result Flag Reference   CHOLESTEROL 202 mg/dL H    HDL,DIRECT 45 mg/dL  40-60   Specimen collection should occur prior to Metamizole administration due to the potential for falsely depressed results     LDL CHOLESTEROL CALCULATED 131 mg/dL H 0-100   TRIGLYCERIDES 132 mg/dL  <=150   Specimen collection should occur prior to N-Acetylcysteine or Metamizole administration due to the potential for falsely depressed results       (1) TSH 25Apr2016 11:24AM Alford Heimlich   TW Order Number: [id number]    TW Order Number: [id number] Order Number: [id number] Order Number: [id number]     Test Name Result Flag Reference   TSH 2 150 uIU/mL  0 358-3 740

## 2018-01-22 VITALS — HEART RATE: 78 BPM | DIASTOLIC BLOOD PRESSURE: 86 MMHG | SYSTOLIC BLOOD PRESSURE: 158 MMHG

## 2018-01-23 NOTE — RESULT NOTES
Verified Results  * XR CHEST PA & LATERAL [id number] 02:22PM Jeramy Quiroga Order Number: [id number]     Test Name Result Flag Reference   XR CHEST PA & LATERAL (Report)     CHEST      INDICATION: Cough and congestion  COMPARISON: None     VIEWS: Frontal and lateral projections     IMAGES: 2     FINDINGS:        Cardiomediastinal silhouette appears unremarkable  The lungs are clear  No pneumothorax or pleural effusion  Degenerative changes are present in the spine and shoulders         IMPRESSION:     No acute abnormality in the chest        Workstation performed: [id number]     Signed by:   Luca Elam MD   1/5/18

## 2018-01-26 ENCOUNTER — TELEPHONE (OUTPATIENT)
Dept: INTERNAL MEDICINE CLINIC | Facility: CLINIC | Age: 73
End: 2018-01-26

## 2018-01-26 DIAGNOSIS — H81.319 AUDITORY VERTIGO, UNSPECIFIED LATERALITY: ICD-10-CM

## 2018-01-26 DIAGNOSIS — I10 ESSENTIAL HYPERTENSION: Primary | ICD-10-CM

## 2018-01-26 PROBLEM — K21.9 GERD (GASTROESOPHAGEAL REFLUX DISEASE): Status: ACTIVE | Noted: 2017-10-10

## 2018-01-26 PROBLEM — R05.9 COUGH: Status: ACTIVE | Noted: 2017-06-14

## 2018-01-26 RX ORDER — TRIAMTERENE AND HYDROCHLOROTHIAZIDE 37.5; 25 MG/1; MG/1
1 CAPSULE ORAL DAILY
COMMUNITY
Start: 2011-01-18 | End: 2018-01-26 | Stop reason: SDUPTHER

## 2018-01-26 RX ORDER — LISINOPRIL 10 MG/1
1 TABLET ORAL DAILY
COMMUNITY
Start: 2017-01-19 | End: 2018-01-26 | Stop reason: SDUPTHER

## 2018-01-26 RX ORDER — LISINOPRIL 10 MG/1
10 TABLET ORAL DAILY
Qty: 90 TABLET | Refills: 1 | Status: SHIPPED | OUTPATIENT
Start: 2018-01-26 | End: 2018-07-20 | Stop reason: SDUPTHER

## 2018-01-26 RX ORDER — TRIAMTERENE AND HYDROCHLOROTHIAZIDE 37.5; 25 MG/1; MG/1
1 CAPSULE ORAL DAILY
Qty: 90 CAPSULE | Refills: 1 | Status: SHIPPED | OUTPATIENT
Start: 2018-01-26 | End: 2018-07-20 | Stop reason: SDUPTHER

## 2018-01-26 NOTE — TELEPHONE ENCOUNTER
PT REQUESTING LISINOPRIL 10 MG ONCE A DAY AND TRIAMLERENE-HCTZ 37 5-25 MG ML TWICE  A DAY BE SENT TO [facility]

## 2018-02-01 DIAGNOSIS — I10 ESSENTIAL (PRIMARY) HYPERTENSION: ICD-10-CM

## 2018-02-01 DIAGNOSIS — R73.03 PREDIABETES: ICD-10-CM

## 2018-02-09 ENCOUNTER — APPOINTMENT (OUTPATIENT)
Dept: LAB | Facility: CLINIC | Age: 73
End: 2018-02-09
Payer: MEDICARE

## 2018-02-09 ENCOUNTER — TRANSCRIBE ORDERS (OUTPATIENT)
Dept: LAB | Facility: CLINIC | Age: 73
End: 2018-02-09

## 2018-02-09 DIAGNOSIS — R73.03 PREDIABETES: ICD-10-CM

## 2018-02-09 DIAGNOSIS — Z11.59 SCREENING EXAMINATION FOR POLIOMYELITIS: Primary | ICD-10-CM

## 2018-02-09 DIAGNOSIS — Z11.59 SCREENING EXAMINATION FOR POLIOMYELITIS: ICD-10-CM

## 2018-02-09 DIAGNOSIS — I10 ESSENTIAL (PRIMARY) HYPERTENSION: ICD-10-CM

## 2018-02-09 LAB
ANION GAP SERPL CALCULATED.3IONS-SCNC: 7 MMOL/L (ref 4–13)
BUN SERPL-MCNC: 21 MG/DL (ref 5–25)
CALCIUM SERPL-MCNC: 9.4 MG/DL (ref 8.3–10.1)
CHLORIDE SERPL-SCNC: 101 MMOL/L (ref 100–108)
CO2 SERPL-SCNC: 32 MMOL/L (ref 21–32)
CREAT SERPL-MCNC: 1.17 MG/DL (ref 0.6–1.3)
EST. AVERAGE GLUCOSE BLD GHB EST-MCNC: 131 MG/DL
GFR SERPL CREATININE-BSD FRML MDRD: 61 ML/MIN/1.73SQ M
GLUCOSE P FAST SERPL-MCNC: 100 MG/DL (ref 65–99)
HBA1C MFR BLD: 6.2 % (ref 4.2–6.3)
HCV AB SER QL: NORMAL
POTASSIUM SERPL-SCNC: 4.9 MMOL/L (ref 3.5–5.3)
SODIUM SERPL-SCNC: 140 MMOL/L (ref 136–145)

## 2018-02-09 PROCEDURE — 36415 COLL VENOUS BLD VENIPUNCTURE: CPT

## 2018-02-09 PROCEDURE — 83036 HEMOGLOBIN GLYCOSYLATED A1C: CPT

## 2018-02-09 PROCEDURE — 86803 HEPATITIS C AB TEST: CPT

## 2018-02-09 PROCEDURE — 80048 BASIC METABOLIC PNL TOTAL CA: CPT

## 2018-02-13 ENCOUNTER — OFFICE VISIT (OUTPATIENT)
Dept: INTERNAL MEDICINE CLINIC | Facility: CLINIC | Age: 73
End: 2018-02-13
Payer: MEDICARE

## 2018-02-13 VITALS
BODY MASS INDEX: 37.8 KG/M2 | HEART RATE: 72 BPM | WEIGHT: 235.2 LBS | TEMPERATURE: 98 F | RESPIRATION RATE: 18 BRPM | DIASTOLIC BLOOD PRESSURE: 76 MMHG | OXYGEN SATURATION: 96 % | SYSTOLIC BLOOD PRESSURE: 120 MMHG | HEIGHT: 66 IN

## 2018-02-13 DIAGNOSIS — I10 ESSENTIAL HYPERTENSION: ICD-10-CM

## 2018-02-13 DIAGNOSIS — Z71.9 HEALTH COUNSELING: ICD-10-CM

## 2018-02-13 DIAGNOSIS — R73.03 PREDIABETES: ICD-10-CM

## 2018-02-13 DIAGNOSIS — E78.1 ESSENTIAL HYPERTRIGLYCERIDEMIA: ICD-10-CM

## 2018-02-13 DIAGNOSIS — M25.511 ACUTE PAIN OF RIGHT SHOULDER: ICD-10-CM

## 2018-02-13 DIAGNOSIS — R05.9 COUGH: Primary | ICD-10-CM

## 2018-02-13 DIAGNOSIS — E78.2 MIXED HYPERLIPIDEMIA: ICD-10-CM

## 2018-02-13 PROCEDURE — 99214 OFFICE O/P EST MOD 30 MIN: CPT | Performed by: INTERNAL MEDICINE

## 2018-02-13 RX ORDER — FLUTICASONE PROPIONATE 50 MCG
1-2 SPRAY, SUSPENSION (ML) NASAL DAILY
COMMUNITY
Start: 2017-06-14

## 2018-02-13 RX ORDER — AMOXICILLIN 500 MG
1 CAPSULE ORAL 2 TIMES DAILY
COMMUNITY
Start: 2013-05-31

## 2018-02-13 RX ORDER — AZELASTINE 1 MG/ML
1-2 SPRAY, METERED NASAL 2 TIMES DAILY PRN
COMMUNITY
Start: 2018-01-04

## 2018-02-13 RX ORDER — GEMFIBROZIL 600 MG/1
1 TABLET, FILM COATED ORAL 2 TIMES DAILY
COMMUNITY
Start: 2011-03-08 | End: 2018-04-02 | Stop reason: SDUPTHER

## 2018-02-13 RX ORDER — MULTIVITAMIN
1 TABLET ORAL DAILY
COMMUNITY
Start: 2013-05-31

## 2018-02-13 RX ORDER — PRAVASTATIN SODIUM 10 MG
1 TABLET ORAL DAILY
COMMUNITY
Start: 2013-09-06 | End: 2018-04-02 | Stop reason: SDUPTHER

## 2018-02-13 RX ORDER — OMEPRAZOLE 20 MG/1
1 CAPSULE, DELAYED RELEASE ORAL DAILY
COMMUNITY
Start: 2017-10-10 | End: 2019-04-17 | Stop reason: SDUPTHER
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
## 2018-04-02 DIAGNOSIS — E78.2 MIXED HYPERLIPIDEMIA: Primary | ICD-10-CM

## 2018-04-02 RX ORDER — GEMFIBROZIL 600 MG/1
TABLET, FILM COATED ORAL
Qty: 180 TABLET | Refills: 1 | Status: SHIPPED | OUTPATIENT
Start: 2018-04-02 | End: 2018-10-05 | Stop reason: SDUPTHER

## 2018-04-02 RX ORDER — PRAVASTATIN SODIUM 10 MG
TABLET ORAL
Qty: 90 TABLET | Refills: 1 | Status: SHIPPED | OUTPATIENT
Start: 2018-04-02 | End: 2018-10-05 | Stop reason: SDUPTHER

## 2018-07-20 DIAGNOSIS — I10 ESSENTIAL HYPERTENSION: ICD-10-CM

## 2018-07-20 DIAGNOSIS — H81.319 AUDITORY VERTIGO, UNSPECIFIED LATERALITY: ICD-10-CM

## 2018-07-20 RX ORDER — LISINOPRIL 10 MG/1
10 TABLET ORAL DAILY
Qty: 90 TABLET | Refills: 1 | Status: SHIPPED | OUTPATIENT
Start: 2018-07-20 | End: 2019-01-15 | Stop reason: SDUPTHER

## 2018-07-20 RX ORDER — TRIAMTERENE AND HYDROCHLOROTHIAZIDE 37.5; 25 MG/1; MG/1
1 CAPSULE ORAL DAILY
Qty: 90 CAPSULE | Refills: 1 | Status: SHIPPED | OUTPATIENT
Start: 2018-07-20 | End: 2019-01-29 | Stop reason: SDUPTHER

## 2018-07-20 RX ORDER — LISINOPRIL 10 MG/1
TABLET ORAL
Qty: 90 TABLET | Refills: 1 | OUTPATIENT
Start: 2018-07-20

## 2018-08-07 ENCOUNTER — APPOINTMENT (OUTPATIENT)
Dept: LAB | Facility: CLINIC | Age: 73
End: 2018-08-07
Payer: MEDICARE

## 2018-08-07 DIAGNOSIS — E78.1 ESSENTIAL HYPERTRIGLYCERIDEMIA: ICD-10-CM

## 2018-08-07 DIAGNOSIS — E78.2 MIXED HYPERLIPIDEMIA: ICD-10-CM

## 2018-08-07 DIAGNOSIS — I10 ESSENTIAL HYPERTENSION: ICD-10-CM

## 2018-08-07 DIAGNOSIS — R73.03 PREDIABETES: ICD-10-CM

## 2018-08-07 LAB
ALBUMIN SERPL BCP-MCNC: 4.2 G/DL (ref 3.5–5)
ALP SERPL-CCNC: 63 U/L (ref 46–116)
ALT SERPL W P-5'-P-CCNC: 25 U/L (ref 12–78)
ANION GAP SERPL CALCULATED.3IONS-SCNC: 6 MMOL/L (ref 4–13)
AST SERPL W P-5'-P-CCNC: 17 U/L (ref 5–45)
BASOPHILS # BLD AUTO: 0.05 THOUSANDS/ΜL (ref 0–0.1)
BASOPHILS NFR BLD AUTO: 1 % (ref 0–1)
BILIRUB SERPL-MCNC: 0.38 MG/DL (ref 0.2–1)
BUN SERPL-MCNC: 21 MG/DL (ref 5–25)
CALCIUM SERPL-MCNC: 9.3 MG/DL (ref 8.3–10.1)
CHLORIDE SERPL-SCNC: 106 MMOL/L (ref 100–108)
CHOLEST SERPL-MCNC: 181 MG/DL (ref 50–200)
CO2 SERPL-SCNC: 28 MMOL/L (ref 21–32)
CREAT SERPL-MCNC: 1.13 MG/DL (ref 0.6–1.3)
EOSINOPHIL # BLD AUTO: 0.37 THOUSAND/ΜL (ref 0–0.61)
EOSINOPHIL NFR BLD AUTO: 7 % (ref 0–6)
ERYTHROCYTE [DISTWIDTH] IN BLOOD BY AUTOMATED COUNT: 12.8 % (ref 11.6–15.1)
EST. AVERAGE GLUCOSE BLD GHB EST-MCNC: 128 MG/DL
GFR SERPL CREATININE-BSD FRML MDRD: 64 ML/MIN/1.73SQ M
GLUCOSE P FAST SERPL-MCNC: 91 MG/DL (ref 65–99)
HBA1C MFR BLD: 6.1 % (ref 4.2–6.3)
HCT VFR BLD AUTO: 43.5 % (ref 36.5–49.3)
HDLC SERPL-MCNC: 45 MG/DL (ref 40–60)
HGB BLD-MCNC: 13.8 G/DL (ref 12–17)
IMM GRANULOCYTES # BLD AUTO: 0.01 THOUSAND/UL (ref 0–0.2)
IMM GRANULOCYTES NFR BLD AUTO: 0 % (ref 0–2)
LDLC SERPL CALC-MCNC: 113 MG/DL (ref 0–100)
LYMPHOCYTES # BLD AUTO: 1.7 THOUSANDS/ΜL (ref 0.6–4.47)
LYMPHOCYTES NFR BLD AUTO: 32 % (ref 14–44)
MCH RBC QN AUTO: 31 PG (ref 26.8–34.3)
MCHC RBC AUTO-ENTMCNC: 31.7 G/DL (ref 31.4–37.4)
MCV RBC AUTO: 98 FL (ref 82–98)
MONOCYTES # BLD AUTO: 0.5 THOUSAND/ΜL (ref 0.17–1.22)
MONOCYTES NFR BLD AUTO: 9 % (ref 4–12)
NEUTROPHILS # BLD AUTO: 2.76 THOUSANDS/ΜL (ref 1.85–7.62)
NEUTS SEG NFR BLD AUTO: 51 % (ref 43–75)
NONHDLC SERPL-MCNC: 136 MG/DL
NRBC BLD AUTO-RTO: 0 /100 WBCS
PLATELET # BLD AUTO: 301 THOUSANDS/UL (ref 149–390)
PMV BLD AUTO: 11 FL (ref 8.9–12.7)
POTASSIUM SERPL-SCNC: 4.5 MMOL/L (ref 3.5–5.3)
PROT SERPL-MCNC: 7.8 G/DL (ref 6.4–8.2)
RBC # BLD AUTO: 4.45 MILLION/UL (ref 3.88–5.62)
SODIUM SERPL-SCNC: 140 MMOL/L (ref 136–145)
TRIGL SERPL-MCNC: 117 MG/DL
TSH SERPL DL<=0.05 MIU/L-ACNC: 1.85 UIU/ML (ref 0.36–3.74)
WBC # BLD AUTO: 5.39 THOUSAND/UL (ref 4.31–10.16)

## 2018-08-07 PROCEDURE — 85025 COMPLETE CBC W/AUTO DIFF WBC: CPT

## 2018-08-07 PROCEDURE — 80061 LIPID PANEL: CPT

## 2018-08-07 PROCEDURE — 83036 HEMOGLOBIN GLYCOSYLATED A1C: CPT

## 2018-08-07 PROCEDURE — 84443 ASSAY THYROID STIM HORMONE: CPT

## 2018-08-07 PROCEDURE — 80053 COMPREHEN METABOLIC PANEL: CPT

## 2018-08-07 PROCEDURE — 36415 COLL VENOUS BLD VENIPUNCTURE: CPT

## 2018-08-13 ENCOUNTER — OFFICE VISIT (OUTPATIENT)
Dept: INTERNAL MEDICINE CLINIC | Facility: CLINIC | Age: 73
End: 2018-08-13
Payer: MEDICARE

## 2018-08-13 VITALS
BODY MASS INDEX: 37 KG/M2 | HEART RATE: 65 BPM | OXYGEN SATURATION: 94 % | WEIGHT: 230.2 LBS | TEMPERATURE: 98.6 F | SYSTOLIC BLOOD PRESSURE: 124 MMHG | HEIGHT: 66 IN | RESPIRATION RATE: 18 BRPM | DIASTOLIC BLOOD PRESSURE: 68 MMHG

## 2018-08-13 DIAGNOSIS — E78.2 MIXED HYPERLIPIDEMIA: ICD-10-CM

## 2018-08-13 DIAGNOSIS — R73.03 PREDIABETES: ICD-10-CM

## 2018-08-13 DIAGNOSIS — Z00.00 HEALTH MAINTENANCE EXAMINATION: ICD-10-CM

## 2018-08-13 DIAGNOSIS — E66.09 CLASS 2 OBESITY DUE TO EXCESS CALORIES WITHOUT SERIOUS COMORBIDITY WITH BODY MASS INDEX (BMI) OF 37.0 TO 37.9 IN ADULT: ICD-10-CM

## 2018-08-13 DIAGNOSIS — I10 ESSENTIAL HYPERTENSION: ICD-10-CM

## 2018-08-13 DIAGNOSIS — K21.9 GASTROESOPHAGEAL REFLUX DISEASE WITHOUT ESOPHAGITIS: ICD-10-CM

## 2018-08-13 DIAGNOSIS — Z23 NEED FOR SHINGLES VACCINE: Primary | ICD-10-CM

## 2018-08-13 DIAGNOSIS — R05.9 COUGH: ICD-10-CM

## 2018-08-13 PROBLEM — L98.9 PRECANCEROUS SKIN LESION: Status: ACTIVE | Noted: 2018-08-13

## 2018-08-13 PROCEDURE — G0439 PPPS, SUBSEQ VISIT: HCPCS | Performed by: INTERNAL MEDICINE

## 2018-08-13 PROCEDURE — 99214 OFFICE O/P EST MOD 30 MIN: CPT | Performed by: INTERNAL MEDICINE
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
## 2018-08-13 NOTE — ASSESSMENT & PLAN NOTE
LDL slightly elevated  Maintain on gemfibrozil and pravastatin  If LDL worsens, increase pravastatin

## 2018-08-13 NOTE — PROGRESS NOTES
Assessment/Plan:    Class 2 obesity due to excess calories without serious comorbidity with body mass index (BMI) of 37 0 to 37 9 in adult  Lost 5 lbs since last visit  Cough  Intermittent, worse at night  Essential hypertension  On lisinopril  Mixed hyperlipidemia  LDL slightly elevated  Maintain on gemfibrozil and pravastatin  If LDL worsens, increase pravastatin  Gastroesophageal reflux disease without esophagitis  On daily PPI  Auditory vertigo  He has been less symptomatic lately, on diuretics  Precancerous skin lesion  Follow up with Dermatology for further removal of skin lesion on his back  Reassured on skin lesion on abdomen  Prediabetes  A1c stable  Diagnoses and all orders for this visit:    Need for shingles vaccine  -     Zoster Vac Recomb Adjuvanted 50 MCG SUSR; Inject 1 mL into a muscle once for 1 dose Repeat in 2-6 months IM    Prediabetes  -     Hemoglobin A1C; Future    Mixed hyperlipidemia  -     Lipid panel; Future    Essential hypertension  -     Basic metabolic panel; Future    Class 2 obesity due to excess calories without serious comorbidity with body mass index (BMI) of 37 0 to 37 9 in adult    Gastroesophageal reflux disease without esophagitis    Cough    Health maintenance examination  Comments:  Colonoscopy at age [de-identified]  Shingrix at the pharmacy  Follow up in 5 months or as needed  Subjective:      Patient ID: Sedrick Alex is a 68 y o  male  Mr  Inell Yeyo is feeling well  Reports his right shoulder pain has resolved  He was been to the dermatologist recently, has an appointment to return to removed further areas of the skin lesion on his back  He reports his cough is much better, usually notices it when he lays down to sleep at night  He also reports that his dizziness not been bothering him as much as it used to  He forgets to take gemfibrozil in the evening, takes rest of his medications regularly      He complains of right heel pain, occurred with change of footwear  Pain lasted for only a day or so, and it has not recurred  The following portions of the patient's history were reviewed and updated as appropriate: allergies, current medications, past medical history, past social history and problem list     Review of Systems   Constitutional: Negative for appetite change and fatigue  HENT: Negative for congestion, hearing loss and postnasal drip  Eyes: Negative  Respiratory: Negative for cough, chest tightness and shortness of breath  Cardiovascular: Negative for chest pain, palpitations and leg swelling  Gastrointestinal: Negative for abdominal pain and constipation  Genitourinary: Negative for dysuria, frequency and urgency  Musculoskeletal: Negative for arthralgias and joint swelling  Skin: Negative for rash and wound  Neurological: Negative for dizziness, numbness and headaches  Psychiatric/Behavioral: Negative for sleep disturbance  The patient is not nervous/anxious  Objective:      /68   Pulse 65   Temp 98 6 °F (37 °C) (Oral)   Resp 18   Ht 5' 6" (1 676 m)   Wt 104 kg (230 lb 3 2 oz)   SpO2 94%   BMI 37 16 kg/m²          Physical Exam   Constitutional: He is oriented to person, place, and time  He appears well-developed and well-nourished  HENT:   Head: Normocephalic and atraumatic  Mouth/Throat: Mucous membranes are normal    Eyes: Conjunctivae are normal  Pupils are equal, round, and reactive to light  Neck: Neck supple  Cardiovascular: Normal rate, regular rhythm and normal heart sounds  No edema  Pulmonary/Chest: Effort normal  He has no wheezes  He has no rhonchi  Abdominal: Soft  Bowel sounds are normal    Neurological: He is alert and oriented to person, place, and time  Skin: Skin is warm  No rash noted  Psychiatric: He has a normal mood and affect  His behavior is normal    Nursing note and vitals reviewed  Lab results reviewed with patient

## 2018-09-18 ENCOUNTER — OFFICE VISIT (OUTPATIENT)
Dept: INTERNAL MEDICINE CLINIC | Facility: CLINIC | Age: 73
End: 2018-09-18
Payer: MEDICARE

## 2018-09-18 VITALS
SYSTOLIC BLOOD PRESSURE: 126 MMHG | HEART RATE: 76 BPM | OXYGEN SATURATION: 98 % | RESPIRATION RATE: 18 BRPM | TEMPERATURE: 98 F | WEIGHT: 231.4 LBS | DIASTOLIC BLOOD PRESSURE: 78 MMHG | BODY MASS INDEX: 37.19 KG/M2 | HEIGHT: 66 IN

## 2018-09-18 DIAGNOSIS — G57.01 PYRIFORMIS SYNDROME, RIGHT: Primary | ICD-10-CM

## 2018-09-18 PROCEDURE — 99213 OFFICE O/P EST LOW 20 MIN: CPT | Performed by: INTERNAL MEDICINE

## 2018-09-18 RX ORDER — BACLOFEN 10 MG/1
10 TABLET ORAL 2 TIMES DAILY PRN
Qty: 30 TABLET | Refills: 0 | Status: SHIPPED | OUTPATIENT
Start: 2018-09-18 | End: 2018-10-03 | Stop reason: SDUPTHER

## 2018-09-18 NOTE — PROGRESS NOTES
Assessment/Plan:    No problem-specific Assessment & Plan notes found for this encounter  Diagnoses and all orders for this visit:    Pyriformis syndrome, right  Comments:  Start muscle relaxant, given exercises to do at home  If no improvement, refer to physical therapy  May continue with NSAIDs prn  Orders:  -     baclofen 10 mg tablet; Take 1 tablet (10 mg total) by mouth 2 (two) times a day as needed for muscle spasms      Follow up as scheduled  Subjective:      Patient ID: Adrianne Mazariegos is a 68 y o  male  Mr Roverto Bailey complains of right buttock pain  Pain started about 2 days ago  He noticed it after he was standing during choir practice  He reports tightness in his right buttock, sometimes radiating down the back of his right thigh  He had trouble sleeping the past few nights, unable to get into a comfortable position  He has applied ice and heat with no relief  He has been taking ibuprofen 600 mg every 4-6 hours  He has trouble walking and changing position  No trauma  The following portions of the patient's history were reviewed and updated as appropriate: allergies, current medications, past medical history and problem list     Review of Systems   Constitutional: Negative for fatigue  Gastrointestinal: Negative for abdominal pain  Genitourinary: Negative for difficulty urinating  Musculoskeletal: Positive for arthralgias and gait problem  Negative for back pain  Psychiatric/Behavioral: Positive for sleep disturbance  Objective:      /78   Pulse 76   Temp 98 °F (36 7 °C)   Resp 18   Ht 5' 6" (1 676 m)   Wt 105 kg (231 lb 6 4 oz)   SpO2 98%   BMI 37 35 kg/m²          Physical Exam   Constitutional: He appears well-developed  HENT:   Head: Normocephalic  Eyes: Pupils are equal, round, and reactive to light  Pulmonary/Chest: Effort normal    Musculoskeletal:        Arms:  Neurological: He is alert  MMT 5/5 both LE   Skin: Skin is warm  Psychiatric: He has a normal mood and affect  Nursing note and vitals reviewed

## 2018-10-03 DIAGNOSIS — G57.01 PYRIFORMIS SYNDROME, RIGHT: ICD-10-CM

## 2018-10-03 RX ORDER — BACLOFEN 10 MG/1
10 TABLET ORAL 2 TIMES DAILY PRN
Qty: 60 TABLET | Refills: 0 | Status: SHIPPED | OUTPATIENT
Start: 2018-10-03 | End: 2019-05-22 | Stop reason: ALTCHOICE

## 2018-10-05 DIAGNOSIS — E78.2 MIXED HYPERLIPIDEMIA: ICD-10-CM

## 2018-10-05 RX ORDER — PRAVASTATIN SODIUM 10 MG
10 TABLET ORAL DAILY
Qty: 90 TABLET | Refills: 1 | Status: SHIPPED | OUTPATIENT
Start: 2018-10-05 | End: 2018-10-08 | Stop reason: SDUPTHER

## 2018-10-05 RX ORDER — GEMFIBROZIL 600 MG/1
600 TABLET, FILM COATED ORAL 2 TIMES DAILY
Qty: 180 TABLET | Refills: 1 | Status: SHIPPED | OUTPATIENT
Start: 2018-10-05 | End: 2018-10-08 | Stop reason: SDUPTHER

## 2018-10-08 DIAGNOSIS — E78.2 MIXED HYPERLIPIDEMIA: ICD-10-CM

## 2018-10-08 RX ORDER — GEMFIBROZIL 600 MG/1
600 TABLET, FILM COATED ORAL 2 TIMES DAILY
Qty: 180 TABLET | Refills: 0 | Status: SHIPPED | OUTPATIENT
Start: 2018-10-08 | End: 2019-01-29 | Stop reason: SDUPTHER

## 2018-10-08 RX ORDER — PRAVASTATIN SODIUM 10 MG
10 TABLET ORAL DAILY
Qty: 90 TABLET | Refills: 0 | Status: SHIPPED | OUTPATIENT
Start: 2018-10-08 | End: 2019-01-07 | Stop reason: SDUPTHER

## 2018-10-08 NOTE — TELEPHONE ENCOUNTER
prescriptions got called through to [facility] but are suppose to be mail ordered through [facility] mail order  Pt called and told pharmacy to cancel them

## 2019-01-07 DIAGNOSIS — E78.2 MIXED HYPERLIPIDEMIA: ICD-10-CM

## 2019-01-07 RX ORDER — PRAVASTATIN SODIUM 10 MG
10 TABLET ORAL DAILY
Qty: 90 TABLET | Refills: 1 | Status: SHIPPED | OUTPATIENT
Start: 2019-01-07 | End: 2019-06-28 | Stop reason: SDUPTHER

## 2019-01-10 ENCOUNTER — TRANSCRIBE ORDERS (OUTPATIENT)
Dept: LAB | Facility: CLINIC | Age: 74
End: 2019-01-10

## 2019-01-10 ENCOUNTER — APPOINTMENT (OUTPATIENT)
Dept: LAB | Facility: CLINIC | Age: 74
End: 2019-01-10
Payer: MEDICARE

## 2019-01-10 DIAGNOSIS — I10 ESSENTIAL HYPERTENSION: ICD-10-CM

## 2019-01-10 DIAGNOSIS — R73.03 PREDIABETES: ICD-10-CM

## 2019-01-10 DIAGNOSIS — E78.2 MIXED HYPERLIPIDEMIA: ICD-10-CM

## 2019-01-10 LAB
ANION GAP SERPL CALCULATED.3IONS-SCNC: 8 MMOL/L (ref 4–13)
BUN SERPL-MCNC: 17 MG/DL (ref 5–25)
CALCIUM SERPL-MCNC: 9.1 MG/DL (ref 8.3–10.1)
CHLORIDE SERPL-SCNC: 101 MMOL/L (ref 100–108)
CHOLEST SERPL-MCNC: 170 MG/DL (ref 50–200)
CO2 SERPL-SCNC: 30 MMOL/L (ref 21–32)
CREAT SERPL-MCNC: 1.04 MG/DL (ref 0.6–1.3)
EST. AVERAGE GLUCOSE BLD GHB EST-MCNC: 134 MG/DL
GFR SERPL CREATININE-BSD FRML MDRD: 71 ML/MIN/1.73SQ M
GLUCOSE P FAST SERPL-MCNC: 98 MG/DL (ref 65–99)
HBA1C MFR BLD: 6.3 % (ref 4.2–6.3)
HDLC SERPL-MCNC: 46 MG/DL (ref 40–60)
LDLC SERPL CALC-MCNC: 101 MG/DL (ref 0–100)
NONHDLC SERPL-MCNC: 124 MG/DL
POTASSIUM SERPL-SCNC: 4.4 MMOL/L (ref 3.5–5.3)
SODIUM SERPL-SCNC: 139 MMOL/L (ref 136–145)
TRIGL SERPL-MCNC: 115 MG/DL

## 2019-01-10 PROCEDURE — 80061 LIPID PANEL: CPT

## 2019-01-10 PROCEDURE — 36415 COLL VENOUS BLD VENIPUNCTURE: CPT

## 2019-01-10 PROCEDURE — 80048 BASIC METABOLIC PNL TOTAL CA: CPT

## 2019-01-10 PROCEDURE — 83036 HEMOGLOBIN GLYCOSYLATED A1C: CPT

## 2019-01-15 ENCOUNTER — OFFICE VISIT (OUTPATIENT)
Dept: INTERNAL MEDICINE CLINIC | Facility: CLINIC | Age: 74
End: 2019-01-15
Payer: MEDICARE

## 2019-01-15 VITALS
TEMPERATURE: 98.6 F | BODY MASS INDEX: 37.25 KG/M2 | HEIGHT: 66 IN | RESPIRATION RATE: 18 BRPM | WEIGHT: 231.8 LBS | SYSTOLIC BLOOD PRESSURE: 120 MMHG | HEART RATE: 75 BPM | DIASTOLIC BLOOD PRESSURE: 68 MMHG | OXYGEN SATURATION: 97 %

## 2019-01-15 DIAGNOSIS — R42 DIZZINESS: ICD-10-CM

## 2019-01-15 DIAGNOSIS — L85.3 DRY SKIN: ICD-10-CM

## 2019-01-15 DIAGNOSIS — E78.1 ESSENTIAL HYPERTRIGLYCERIDEMIA: ICD-10-CM

## 2019-01-15 DIAGNOSIS — K21.9 GASTROESOPHAGEAL REFLUX DISEASE WITHOUT ESOPHAGITIS: ICD-10-CM

## 2019-01-15 DIAGNOSIS — R05.9 COUGH: ICD-10-CM

## 2019-01-15 DIAGNOSIS — I10 ESSENTIAL HYPERTENSION: ICD-10-CM

## 2019-01-15 DIAGNOSIS — E66.09 CLASS 2 OBESITY DUE TO EXCESS CALORIES WITHOUT SERIOUS COMORBIDITY WITH BODY MASS INDEX (BMI) OF 37.0 TO 37.9 IN ADULT: ICD-10-CM

## 2019-01-15 DIAGNOSIS — E78.2 MIXED HYPERLIPIDEMIA: Primary | ICD-10-CM

## 2019-01-15 DIAGNOSIS — R73.03 PREDIABETES: ICD-10-CM

## 2019-01-15 PROCEDURE — 99214 OFFICE O/P EST MOD 30 MIN: CPT | Performed by: INTERNAL MEDICINE

## 2019-01-15 RX ORDER — LISINOPRIL 10 MG/1
5 TABLET ORAL DAILY
Qty: 90 TABLET | Refills: 0
Start: 2019-01-15 | End: 2019-01-29 | Stop reason: SDUPTHER

## 2019-01-15 NOTE — ASSESSMENT & PLAN NOTE
A1c worse at 6 3%  Discussed low carb diet, start regular aerobic exercise  If no improvement, recommend starting metformin

## 2019-01-15 NOTE — PROGRESS NOTES
Assessment/Plan:    Essential hypertension  Decrease lisinopril to 5 mg daily  Still on diuretics  Recheck BP in 2 weeks  Cough  Symptoms worse at night  No improvement with nasal sprays and PPI  ? LPR  Refer to ENT  Mixed hyperlipidemia  Improved  Maintain on statin  Auditory vertigo  Stable, on diuretics  Prediabetes  A1c worse at 6 3%  Discussed low carb diet, start regular aerobic exercise  If no improvement, recommend starting metformin  Gastroesophageal reflux disease without esophagitis  Off PPI, did not help cough symptoms  Class 2 obesity due to excess calories without serious comorbidity with body mass index (BMI) of 37 0 to 37 9 in adult  No weight change  Diagnoses and all orders for this visit:    Mixed hyperlipidemia  -     Comprehensive metabolic panel; Future    Essential hypertension  -     lisinopril (ZESTRIL) 10 mg tablet; Take 0 5 tablets (5 mg total) by mouth daily  -     CBC and differential; Future    Cough  -     Ambulatory Referral to Otolaryngology; Future    Class 2 obesity due to excess calories without serious comorbidity with body mass index (BMI) of 37 0 to 37 9 in adult    Gastroesophageal reflux disease without esophagitis  -     Ambulatory Referral to Otolaryngology; Future    Dizziness    Prediabetes  -     Hemoglobin A1C; Future    Essential hypertriglyceridemia    Dry skin  Comments: Instructed to apply petroleum jelly on hands bid  Follow up in 4 months or as needed  Subjective:      Patient ID: Nick Kim is a 68 y o  male  Mr  Anu Lucio complains of lightheadedness  Reports symptoms would occur when he would bend down to  something  He always go slow, still feels dizzy  He denies any symptoms when getting out of bed or rapid head movements  He feels the vertigo he has had in the past is not the same  He is taking diuretics for this  He denies any syncope or presyncopal events  He keeps hydrated at all times      He continues to experience a nonproductive cough, worse when laying down at night  He has tried nasal sprays which did not help  He also took reflux medications which did not help  Symptoms ongoing for almost a year  He reports drinking lemon water at night and in the middle of the night would help  Recently, wife reports he has been sneezing  He has not taken any allergy pills  He also complains of dry hands, started over a month ago  He denies washing his hands frequently or using gloves  Denies any bleeding or discharge  His wife started to apply thick hand cream     He complains of occasional gas, not related to food intake  He moves his bowels regularly  No nausea or vomiting  He reports that his sciatic as much better  This took several weeks before resolving  He did do back exercises which helped  The following portions of the patient's history were reviewed and updated as appropriate: allergies, current medications, past medical history, past social history and problem list     Review of Systems   Constitutional: Negative for appetite change and fatigue  HENT: Negative for congestion and hearing loss  Eyes: Negative  Negative for visual disturbance  Respiratory: Negative for cough, chest tightness and shortness of breath  Cardiovascular: Negative for chest pain, palpitations and leg swelling  Gastrointestinal: Negative for abdominal pain and constipation  Genitourinary: Negative for dysuria and frequency  Musculoskeletal: Negative for arthralgias and back pain  Skin: Negative for rash and wound  Neurological: Positive for dizziness and light-headedness  Negative for tremors, syncope, weakness, numbness and headaches  Hematological: Negative for adenopathy  Psychiatric/Behavioral: Negative for confusion and sleep disturbance  The patient is not nervous/anxious            Objective:      /68 (BP Location: Left arm, Patient Position: Standing, Cuff Size: Large) Comment: lightheaded upon standing  Pulse 75   Temp 98 6 °F (37 °C)   Resp 18   Ht 5' 6" (1 676 m)   Wt 105 kg (231 lb 12 8 oz)   SpO2 97%   BMI 37 41 kg/m²          Physical Exam   Constitutional: He is oriented to person, place, and time  He appears well-developed and well-nourished  HENT:   Head: Normocephalic and atraumatic  Nose: No mucosal edema or rhinorrhea  Mouth/Throat: Mucous membranes are normal    Eyes: Pupils are equal, round, and reactive to light  Conjunctivae are normal    Neck: Neck supple  Cardiovascular: Normal rate, regular rhythm and normal heart sounds  No edema  Pulmonary/Chest: Effort normal  He has no wheezes  He has no rhonchi  Abdominal: Soft  Bowel sounds are normal    Neurological: He is alert and oriented to person, place, and time  Skin: Skin is warm  No rash noted  Psychiatric: He has a normal mood and affect  His behavior is normal    Nursing note and vitals reviewed  Lab results reviewed with patient

## 2019-01-29 ENCOUNTER — CLINICAL SUPPORT (OUTPATIENT)
Dept: INTERNAL MEDICINE CLINIC | Facility: CLINIC | Age: 74
End: 2019-01-29

## 2019-01-29 VITALS — SYSTOLIC BLOOD PRESSURE: 126 MMHG | HEART RATE: 74 BPM | DIASTOLIC BLOOD PRESSURE: 70 MMHG

## 2019-01-29 DIAGNOSIS — H81.319 AUDITORY VERTIGO, UNSPECIFIED LATERALITY: ICD-10-CM

## 2019-01-29 DIAGNOSIS — E78.2 MIXED HYPERLIPIDEMIA: ICD-10-CM

## 2019-01-29 DIAGNOSIS — I10 ESSENTIAL HYPERTENSION: ICD-10-CM

## 2019-01-29 PROCEDURE — RECHECK: Performed by: INTERNAL MEDICINE

## 2019-01-29 RX ORDER — GEMFIBROZIL 600 MG/1
600 TABLET, FILM COATED ORAL 2 TIMES DAILY
Qty: 180 TABLET | Refills: 1 | Status: SHIPPED | OUTPATIENT
Start: 2019-01-29 | End: 2019-02-05 | Stop reason: SDUPTHER

## 2019-01-29 RX ORDER — TRIAMTERENE AND HYDROCHLOROTHIAZIDE 37.5; 25 MG/1; MG/1
1 CAPSULE ORAL DAILY
Qty: 90 CAPSULE | Refills: 1 | Status: SHIPPED | OUTPATIENT
Start: 2019-01-29 | End: 2019-02-05 | Stop reason: SDUPTHER

## 2019-01-29 RX ORDER — LISINOPRIL 5 MG/1
5 TABLET ORAL DAILY
Qty: 90 TABLET | Refills: 1 | Status: SHIPPED | OUTPATIENT
Start: 2019-01-29 | End: 2019-02-05 | Stop reason: SDUPTHER

## 2019-01-29 NOTE — PROGRESS NOTES
Patient is here for BP check  Patient is not checking at home but would like a script for a home BP machine  Patients BP was 126/70 Pulse of 74  Per Dr Magana Proud:    Patient should continue Lisinopril 5mg daily and follow up as scheduled  Script written for Bp cuff       Patient notified

## 2019-02-05 DIAGNOSIS — H81.319 AUDITORY VERTIGO, UNSPECIFIED LATERALITY: ICD-10-CM

## 2019-02-05 DIAGNOSIS — I10 ESSENTIAL HYPERTENSION: ICD-10-CM

## 2019-02-05 DIAGNOSIS — E78.2 MIXED HYPERLIPIDEMIA: ICD-10-CM

## 2019-02-05 NOTE — TELEPHONE ENCOUNTER
Patient's wife called wandering where is prescriptions are, the three that were ordered on the 29th were sent to [facility] and were suppose to go to [facility]  I will call [facility] and cancel the prescriptions , can we possibly get this done today so they can get them in few days

## 2019-02-06 RX ORDER — GEMFIBROZIL 600 MG/1
600 TABLET, FILM COATED ORAL 2 TIMES DAILY
Qty: 180 TABLET | Refills: 1 | Status: SHIPPED | OUTPATIENT
Start: 2019-02-06 | End: 2019-08-16 | Stop reason: SDUPTHER

## 2019-02-06 RX ORDER — LISINOPRIL 5 MG/1
5 TABLET ORAL DAILY
Qty: 90 TABLET | Refills: 1 | Status: SHIPPED | OUTPATIENT
Start: 2019-02-06 | End: 2019-05-22 | Stop reason: ALTCHOICE

## 2019-02-06 RX ORDER — TRIAMTERENE AND HYDROCHLOROTHIAZIDE 37.5; 25 MG/1; MG/1
1 CAPSULE ORAL DAILY
Qty: 90 CAPSULE | Refills: 1 | Status: SHIPPED | OUTPATIENT
Start: 2019-02-06 | End: 2019-04-29 | Stop reason: SDUPTHER

## 2019-04-29 DIAGNOSIS — H81.319 AUDITORY VERTIGO, UNSPECIFIED LATERALITY: ICD-10-CM

## 2019-04-29 RX ORDER — TRIAMTERENE AND HYDROCHLOROTHIAZIDE 37.5; 25 MG/1; MG/1
1 CAPSULE ORAL DAILY
Qty: 90 CAPSULE | Refills: 1 | Status: SHIPPED | OUTPATIENT
Start: 2019-04-29 | End: 2019-10-23 | Stop reason: SDUPTHER

## 2019-05-17 ENCOUNTER — APPOINTMENT (OUTPATIENT)
Dept: LAB | Facility: CLINIC | Age: 74
End: 2019-05-17
Payer: MEDICARE

## 2019-05-17 DIAGNOSIS — E78.2 MIXED HYPERLIPIDEMIA: ICD-10-CM

## 2019-05-17 DIAGNOSIS — R73.03 PREDIABETES: ICD-10-CM

## 2019-05-17 DIAGNOSIS — I10 ESSENTIAL HYPERTENSION: ICD-10-CM

## 2019-05-17 LAB
ALBUMIN SERPL BCP-MCNC: 4.4 G/DL (ref 3.5–5)
ALP SERPL-CCNC: 61 U/L (ref 46–116)
ALT SERPL W P-5'-P-CCNC: 23 U/L (ref 12–78)
ANION GAP SERPL CALCULATED.3IONS-SCNC: 5 MMOL/L (ref 4–13)
AST SERPL W P-5'-P-CCNC: 19 U/L (ref 5–45)
BASOPHILS # BLD AUTO: 0.04 THOUSANDS/ΜL (ref 0–0.1)
BASOPHILS NFR BLD AUTO: 1 % (ref 0–1)
BILIRUB SERPL-MCNC: 0.51 MG/DL (ref 0.2–1)
BUN SERPL-MCNC: 19 MG/DL (ref 5–25)
CALCIUM SERPL-MCNC: 9.2 MG/DL (ref 8.3–10.1)
CHLORIDE SERPL-SCNC: 103 MMOL/L (ref 100–108)
CO2 SERPL-SCNC: 29 MMOL/L (ref 21–32)
CREAT SERPL-MCNC: 1.11 MG/DL (ref 0.6–1.3)
EOSINOPHIL # BLD AUTO: 0.33 THOUSAND/ΜL (ref 0–0.61)
EOSINOPHIL NFR BLD AUTO: 6 % (ref 0–6)
ERYTHROCYTE [DISTWIDTH] IN BLOOD BY AUTOMATED COUNT: 12.8 % (ref 11.6–15.1)
EST. AVERAGE GLUCOSE BLD GHB EST-MCNC: 131 MG/DL
GFR SERPL CREATININE-BSD FRML MDRD: 65 ML/MIN/1.73SQ M
GLUCOSE P FAST SERPL-MCNC: 94 MG/DL (ref 65–99)
HBA1C MFR BLD: 6.2 % (ref 4.2–6.3)
HCT VFR BLD AUTO: 43.7 % (ref 36.5–49.3)
HGB BLD-MCNC: 14.2 G/DL (ref 12–17)
IMM GRANULOCYTES # BLD AUTO: 0.02 THOUSAND/UL (ref 0–0.2)
IMM GRANULOCYTES NFR BLD AUTO: 0 % (ref 0–2)
LYMPHOCYTES # BLD AUTO: 1.59 THOUSANDS/ΜL (ref 0.6–4.47)
LYMPHOCYTES NFR BLD AUTO: 30 % (ref 14–44)
MCH RBC QN AUTO: 31.3 PG (ref 26.8–34.3)
MCHC RBC AUTO-ENTMCNC: 32.5 G/DL (ref 31.4–37.4)
MCV RBC AUTO: 97 FL (ref 82–98)
MONOCYTES # BLD AUTO: 0.45 THOUSAND/ΜL (ref 0.17–1.22)
MONOCYTES NFR BLD AUTO: 8 % (ref 4–12)
NEUTROPHILS # BLD AUTO: 2.93 THOUSANDS/ΜL (ref 1.85–7.62)
NEUTS SEG NFR BLD AUTO: 55 % (ref 43–75)
NRBC BLD AUTO-RTO: 0 /100 WBCS
PLATELET # BLD AUTO: 281 THOUSANDS/UL (ref 149–390)
PMV BLD AUTO: 10.7 FL (ref 8.9–12.7)
POTASSIUM SERPL-SCNC: 4.1 MMOL/L (ref 3.5–5.3)
PROT SERPL-MCNC: 7.7 G/DL (ref 6.4–8.2)
RBC # BLD AUTO: 4.53 MILLION/UL (ref 3.88–5.62)
SODIUM SERPL-SCNC: 137 MMOL/L (ref 136–145)
WBC # BLD AUTO: 5.36 THOUSAND/UL (ref 4.31–10.16)

## 2019-05-17 PROCEDURE — 80053 COMPREHEN METABOLIC PANEL: CPT

## 2019-05-17 PROCEDURE — 83036 HEMOGLOBIN GLYCOSYLATED A1C: CPT

## 2019-05-17 PROCEDURE — 85025 COMPLETE CBC W/AUTO DIFF WBC: CPT

## 2019-05-22 ENCOUNTER — OFFICE VISIT (OUTPATIENT)
Dept: INTERNAL MEDICINE CLINIC | Facility: CLINIC | Age: 74
End: 2019-05-22
Payer: MEDICARE

## 2019-05-22 VITALS
OXYGEN SATURATION: 98 % | TEMPERATURE: 98.1 F | RESPIRATION RATE: 18 BRPM | BODY MASS INDEX: 36.8 KG/M2 | SYSTOLIC BLOOD PRESSURE: 122 MMHG | WEIGHT: 229 LBS | HEIGHT: 66 IN | HEART RATE: 78 BPM | DIASTOLIC BLOOD PRESSURE: 70 MMHG

## 2019-05-22 DIAGNOSIS — Z71.9 HEALTH COUNSELING: ICD-10-CM

## 2019-05-22 DIAGNOSIS — R05.9 COUGH: Primary | ICD-10-CM

## 2019-05-22 DIAGNOSIS — E78.2 MIXED HYPERLIPIDEMIA: ICD-10-CM

## 2019-05-22 DIAGNOSIS — I10 ESSENTIAL HYPERTENSION: ICD-10-CM

## 2019-05-22 DIAGNOSIS — E66.09 CLASS 2 OBESITY DUE TO EXCESS CALORIES WITHOUT SERIOUS COMORBIDITY WITH BODY MASS INDEX (BMI) OF 37.0 TO 37.9 IN ADULT: ICD-10-CM

## 2019-05-22 DIAGNOSIS — R73.03 PREDIABETES: ICD-10-CM

## 2019-05-22 DIAGNOSIS — H81.319 AUDITORY VERTIGO, UNSPECIFIED LATERALITY: ICD-10-CM

## 2019-05-22 PROCEDURE — 99214 OFFICE O/P EST MOD 30 MIN: CPT | Performed by: INTERNAL MEDICINE

## 2019-06-12 ENCOUNTER — CLINICAL SUPPORT (OUTPATIENT)
Dept: INTERNAL MEDICINE CLINIC | Facility: CLINIC | Age: 74
End: 2019-06-12

## 2019-06-12 VITALS — SYSTOLIC BLOOD PRESSURE: 120 MMHG | DIASTOLIC BLOOD PRESSURE: 70 MMHG | HEART RATE: 62 BPM

## 2019-06-12 DIAGNOSIS — I10 ESSENTIAL HYPERTENSION: Primary | ICD-10-CM

## 2019-06-18 PROBLEM — K21.9 LARYNGOPHARYNGEAL REFLUX (LPR): Status: ACTIVE | Noted: 2019-06-18

## 2019-06-28 DIAGNOSIS — E78.2 MIXED HYPERLIPIDEMIA: ICD-10-CM

## 2019-06-28 RX ORDER — PRAVASTATIN SODIUM 10 MG
10 TABLET ORAL DAILY
Qty: 90 TABLET | Refills: 1 | Status: SHIPPED | OUTPATIENT
Start: 2019-06-28 | End: 2019-12-26 | Stop reason: SDUPTHER

## 2019-08-16 DIAGNOSIS — E78.2 MIXED HYPERLIPIDEMIA: ICD-10-CM

## 2019-08-16 RX ORDER — GEMFIBROZIL 600 MG/1
600 TABLET, FILM COATED ORAL 2 TIMES DAILY
Qty: 180 TABLET | Refills: 1 | Status: SHIPPED | OUTPATIENT
Start: 2019-08-16 | End: 2020-03-23 | Stop reason: SDUPTHER

## 2019-10-10 ENCOUNTER — APPOINTMENT (OUTPATIENT)
Dept: LAB | Facility: CLINIC | Age: 74
End: 2019-10-10
Payer: MEDICARE

## 2019-10-10 DIAGNOSIS — R73.03 PREDIABETES: ICD-10-CM

## 2019-10-10 DIAGNOSIS — I10 ESSENTIAL HYPERTENSION: ICD-10-CM

## 2019-10-10 LAB
ANION GAP SERPL CALCULATED.3IONS-SCNC: 3 MMOL/L (ref 4–13)
BUN SERPL-MCNC: 17 MG/DL (ref 5–25)
CALCIUM SERPL-MCNC: 9.5 MG/DL (ref 8.3–10.1)
CHLORIDE SERPL-SCNC: 103 MMOL/L (ref 100–108)
CO2 SERPL-SCNC: 31 MMOL/L (ref 21–32)
CREAT SERPL-MCNC: 1.1 MG/DL (ref 0.6–1.3)
EST. AVERAGE GLUCOSE BLD GHB EST-MCNC: 120 MG/DL
GFR SERPL CREATININE-BSD FRML MDRD: 66 ML/MIN/1.73SQ M
GLUCOSE P FAST SERPL-MCNC: 95 MG/DL (ref 65–99)
HBA1C MFR BLD: 5.8 % (ref 4.2–6.3)
POTASSIUM SERPL-SCNC: 4.1 MMOL/L (ref 3.5–5.3)
SODIUM SERPL-SCNC: 137 MMOL/L (ref 136–145)
TSH SERPL DL<=0.05 MIU/L-ACNC: 2.05 UIU/ML (ref 0.36–3.74)

## 2019-10-10 PROCEDURE — 84443 ASSAY THYROID STIM HORMONE: CPT

## 2019-10-10 PROCEDURE — 83036 HEMOGLOBIN GLYCOSYLATED A1C: CPT

## 2019-10-10 PROCEDURE — 80048 BASIC METABOLIC PNL TOTAL CA: CPT

## 2019-10-10 PROCEDURE — 36415 COLL VENOUS BLD VENIPUNCTURE: CPT

## 2019-10-17 ENCOUNTER — OFFICE VISIT (OUTPATIENT)
Dept: INTERNAL MEDICINE CLINIC | Facility: CLINIC | Age: 74
End: 2019-10-17
Payer: MEDICARE

## 2019-10-17 VITALS
DIASTOLIC BLOOD PRESSURE: 68 MMHG | SYSTOLIC BLOOD PRESSURE: 128 MMHG | TEMPERATURE: 98.6 F | HEART RATE: 66 BPM | WEIGHT: 229.6 LBS | RESPIRATION RATE: 18 BRPM | HEIGHT: 66 IN | BODY MASS INDEX: 36.9 KG/M2 | OXYGEN SATURATION: 96 %

## 2019-10-17 DIAGNOSIS — E78.1 ESSENTIAL HYPERTRIGLYCERIDEMIA: ICD-10-CM

## 2019-10-17 DIAGNOSIS — R73.03 PREDIABETES: ICD-10-CM

## 2019-10-17 DIAGNOSIS — Z23 NEED FOR INFLUENZA VACCINATION: ICD-10-CM

## 2019-10-17 DIAGNOSIS — Z71.9 HEALTH COUNSELING: ICD-10-CM

## 2019-10-17 DIAGNOSIS — K21.9 LARYNGOPHARYNGEAL REFLUX (LPR): ICD-10-CM

## 2019-10-17 DIAGNOSIS — I10 ESSENTIAL HYPERTENSION: ICD-10-CM

## 2019-10-17 DIAGNOSIS — E66.09 CLASS 2 OBESITY DUE TO EXCESS CALORIES WITHOUT SERIOUS COMORBIDITY WITH BODY MASS INDEX (BMI) OF 37.0 TO 37.9 IN ADULT: ICD-10-CM

## 2019-10-17 DIAGNOSIS — E78.2 MIXED HYPERLIPIDEMIA: ICD-10-CM

## 2019-10-17 DIAGNOSIS — K21.9 GASTROESOPHAGEAL REFLUX DISEASE WITHOUT ESOPHAGITIS: Primary | ICD-10-CM

## 2019-10-17 PROBLEM — R05.9 COUGH: Status: RESOLVED | Noted: 2017-06-14 | Resolved: 2019-10-17

## 2019-10-17 PROCEDURE — 90662 IIV NO PRSV INCREASED AG IM: CPT | Performed by: INTERNAL MEDICINE

## 2019-10-17 PROCEDURE — 99214 OFFICE O/P EST MOD 30 MIN: CPT | Performed by: INTERNAL MEDICINE

## 2019-10-17 PROCEDURE — G0008 ADMIN INFLUENZA VIRUS VAC: HCPCS | Performed by: INTERNAL MEDICINE

## 2019-10-17 PROCEDURE — G0439 PPPS, SUBSEQ VISIT: HCPCS | Performed by: INTERNAL MEDICINE
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
## 2019-10-17 NOTE — PROGRESS NOTES
Assessment/Plan:    Gastroesophageal reflux disease without esophagitis  Cough has improved, takes omeprazole daily  Sleeps on an incline  Mixed hyperlipidemia  Consider stopping gemfibrozil and maintain on statin  Repeat lipids next visit  Essential hypertension  BP stable on diuretics  Prediabetes  A1c improved  Class 2 obesity due to excess calories without serious comorbidity with body mass index (BMI) of 37 0 to 37 9 in adult  No weight change  Discussed regular aerobic exercise  Laryngopharyngeal reflux (LPR)  On daily PPI, sees ENT  Auditory vertigo  Stable  Diagnoses and all orders for this visit:    Gastroesophageal reflux disease without esophagitis    Mixed hyperlipidemia  -     Lipid Panel with Direct LDL reflex; Future  -     Comprehensive metabolic panel; Future    Essential hypertension    Class 2 obesity due to excess calories without serious comorbidity with body mass index (BMI) of 37 0 to 37 9 in adult    Essential hypertriglyceridemia    Need for influenza vaccination  -     influenza vaccine, [id number], high-dose, PF 0 5 mL (FLUZONE HIGH-DOSE)    Prediabetes  -     Hemoglobin A1C; Future    Laryngopharyngeal reflux (LPR)    Health counseling  Comments:  Flu vaccine today  Follow up in 6 months or as needed  Subjective:      Patient ID: Gonzalez Foster is a 76 y o  male  Mr Danny Wu is feeling well  He is interested to join a gym, feels he needs to work up more often  He plays volleyball once a week only  He remains active  He reports his cough has improved  He sleeps on an incline and this has helped  He continues to take the omeprazole every morning  The following portions of the patient's history were reviewed and updated as appropriate: allergies, current medications, past medical history, past social history and problem list     Review of Systems   Constitutional: Negative for appetite change and fatigue     HENT: Negative for congestion, hearing loss and postnasal drip  Eyes: Negative  Respiratory: Negative for cough, chest tightness and shortness of breath  Cardiovascular: Negative for chest pain, palpitations and leg swelling  Gastrointestinal: Negative for abdominal pain and constipation  Genitourinary: Negative for dysuria and frequency  Musculoskeletal: Negative for arthralgias  Skin: Negative for rash and wound  Neurological: Negative for dizziness, numbness and headaches  Psychiatric/Behavioral: Negative for sleep disturbance  The patient is not nervous/anxious  Objective:      /68   Pulse 66   Temp 98 6 °F (37 °C)   Resp 18   Ht 5' 6" (1 676 m)   Wt 104 kg (229 lb 9 6 oz)   SpO2 96%   BMI 37 06 kg/m²          Physical Exam   Constitutional: He is oriented to person, place, and time  He appears well-developed and well-nourished  HENT:   Head: Normocephalic and atraumatic  Mouth/Throat: Mucous membranes are normal    Eyes: Pupils are equal, round, and reactive to light  Conjunctivae are normal    Neck: Neck supple  Cardiovascular: Normal rate, regular rhythm and normal heart sounds  Pulmonary/Chest: Effort normal  He has no wheezes  He has no rhonchi  Abdominal: Soft  Bowel sounds are normal    Musculoskeletal: He exhibits no edema  Neurological: He is alert and oriented to person, place, and time  Skin: Skin is warm  No rash noted  Psychiatric: He has a normal mood and affect  His behavior is normal    Nursing note and vitals reviewed  Lab &/or imaging results reviewed with patient

## 2019-10-23 DIAGNOSIS — H81.319 AUDITORY VERTIGO, UNSPECIFIED LATERALITY: ICD-10-CM

## 2019-10-23 RX ORDER — TRIAMTERENE AND HYDROCHLOROTHIAZIDE 37.5; 25 MG/1; MG/1
1 CAPSULE ORAL DAILY
Qty: 90 CAPSULE | Refills: 1 | Status: SHIPPED | OUTPATIENT
Start: 2019-10-23 | End: 2020-01-31 | Stop reason: SDUPTHER

## 2019-11-25 NOTE — ASSESSMENT & PLAN NOTE
Follow up with Dermatology for further removal of skin lesion on his back  Reassured on skin lesion on abdomen  Applied

## 2019-12-26 DIAGNOSIS — E78.2 MIXED HYPERLIPIDEMIA: ICD-10-CM

## 2019-12-26 RX ORDER — PRAVASTATIN SODIUM 10 MG
10 TABLET ORAL DAILY
Qty: 90 TABLET | Refills: 1 | Status: SHIPPED | OUTPATIENT
Start: 2019-12-26 | End: 2020-03-11 | Stop reason: SDUPTHER

## 2020-01-31 DIAGNOSIS — H81.319 AUDITORY VERTIGO, UNSPECIFIED LATERALITY: ICD-10-CM

## 2020-01-31 RX ORDER — TRIAMTERENE AND HYDROCHLOROTHIAZIDE 37.5; 25 MG/1; MG/1
1 CAPSULE ORAL DAILY
Qty: 90 CAPSULE | Refills: 1 | Status: SHIPPED | OUTPATIENT
Start: 2020-01-31 | End: 2020-07-22 | Stop reason: SDUPTHER

## 2020-03-11 DIAGNOSIS — E78.2 MIXED HYPERLIPIDEMIA: ICD-10-CM

## 2020-03-11 RX ORDER — PRAVASTATIN SODIUM 10 MG
10 TABLET ORAL DAILY
Qty: 90 TABLET | Refills: 1 | Status: SHIPPED | OUTPATIENT
Start: 2020-03-11 | End: 2020-09-29 | Stop reason: SDUPTHER

## 2020-03-23 DIAGNOSIS — E78.2 MIXED HYPERLIPIDEMIA: ICD-10-CM

## 2020-03-23 RX ORDER — GEMFIBROZIL 600 MG/1
600 TABLET, FILM COATED ORAL 2 TIMES DAILY
Qty: 180 TABLET | Refills: 1 | Status: SHIPPED | OUTPATIENT
Start: 2020-03-23 | End: 2020-09-29 | Stop reason: SDUPTHER

## 2020-04-17 ENCOUNTER — TELEMEDICINE (OUTPATIENT)
Dept: INTERNAL MEDICINE CLINIC | Facility: CLINIC | Age: 75
End: 2020-04-17
Payer: MEDICARE

## 2020-04-17 VITALS — BODY MASS INDEX: 36.64 KG/M2 | WEIGHT: 228 LBS | HEIGHT: 66 IN

## 2020-04-17 DIAGNOSIS — K21.9 GASTROESOPHAGEAL REFLUX DISEASE WITHOUT ESOPHAGITIS: Primary | ICD-10-CM

## 2020-04-17 DIAGNOSIS — E78.2 MIXED HYPERLIPIDEMIA: ICD-10-CM

## 2020-04-17 DIAGNOSIS — H81.319 AUDITORY VERTIGO, UNSPECIFIED LATERALITY: ICD-10-CM

## 2020-04-17 DIAGNOSIS — Z71.9 HEALTH COUNSELING: ICD-10-CM

## 2020-04-17 DIAGNOSIS — I10 ESSENTIAL HYPERTENSION: ICD-10-CM

## 2020-04-17 DIAGNOSIS — R73.03 PREDIABETES: ICD-10-CM

## 2020-04-17 DIAGNOSIS — E66.09 CLASS 2 OBESITY DUE TO EXCESS CALORIES WITHOUT SERIOUS COMORBIDITY WITH BODY MASS INDEX (BMI) OF 37.0 TO 37.9 IN ADULT: ICD-10-CM

## 2020-04-17 PROCEDURE — 99214 OFFICE O/P EST MOD 30 MIN: CPT | Performed by: INTERNAL MEDICINE

## 2020-04-17 RX ORDER — TRIAMCINOLONE ACETONIDE 1 MG/G
CREAM TOPICAL
COMMUNITY
Start: 2020-04-08

## 2020-07-22 DIAGNOSIS — H81.319 AUDITORY VERTIGO, UNSPECIFIED LATERALITY: ICD-10-CM

## 2020-07-22 RX ORDER — TRIAMTERENE AND HYDROCHLOROTHIAZIDE 37.5; 25 MG/1; MG/1
1 CAPSULE ORAL DAILY
Qty: 90 CAPSULE | Refills: 1 | Status: SHIPPED | OUTPATIENT
Start: 2020-07-22 | End: 2021-01-19 | Stop reason: SDUPTHER

## 2020-09-29 DIAGNOSIS — E78.2 MIXED HYPERLIPIDEMIA: ICD-10-CM

## 2020-09-29 RX ORDER — GEMFIBROZIL 600 MG/1
600 TABLET, FILM COATED ORAL 2 TIMES DAILY
Qty: 180 TABLET | Refills: 1 | Status: SHIPPED | OUTPATIENT
Start: 2020-09-29 | End: 2021-01-08 | Stop reason: SDUPTHER

## 2020-09-29 RX ORDER — PRAVASTATIN SODIUM 10 MG
10 TABLET ORAL DAILY
Qty: 90 TABLET | Refills: 1 | Status: SHIPPED | OUTPATIENT
Start: 2020-09-29 | End: 2021-01-08 | Stop reason: SDUPTHER

## 2020-10-15 ENCOUNTER — LAB (OUTPATIENT)
Dept: LAB | Facility: CLINIC | Age: 75
End: 2020-10-15
Payer: MEDICARE

## 2020-10-15 ENCOUNTER — TRANSCRIBE ORDERS (OUTPATIENT)
Dept: LAB | Facility: CLINIC | Age: 75
End: 2020-10-15

## 2020-10-15 DIAGNOSIS — R73.03 DIABETES MELLITUS, LATENT: ICD-10-CM

## 2020-10-15 DIAGNOSIS — E78.2 MIXED HYPERLIPIDEMIA: ICD-10-CM

## 2020-10-15 DIAGNOSIS — E78.2 MIXED HYPERLIPIDEMIA: Primary | ICD-10-CM

## 2020-10-15 LAB
ALBUMIN SERPL BCP-MCNC: 4.1 G/DL (ref 3.5–5)
ALP SERPL-CCNC: 53 U/L (ref 46–116)
ALT SERPL W P-5'-P-CCNC: 35 U/L (ref 12–78)
ANION GAP SERPL CALCULATED.3IONS-SCNC: 5 MMOL/L (ref 4–13)
AST SERPL W P-5'-P-CCNC: 22 U/L (ref 5–45)
BILIRUB SERPL-MCNC: 0.6 MG/DL (ref 0.2–1)
BUN SERPL-MCNC: 14 MG/DL (ref 5–25)
CALCIUM SERPL-MCNC: 9.4 MG/DL (ref 8.3–10.1)
CHLORIDE SERPL-SCNC: 101 MMOL/L (ref 100–108)
CHOLEST SERPL-MCNC: 168 MG/DL (ref 50–200)
CO2 SERPL-SCNC: 33 MMOL/L (ref 21–32)
CREAT SERPL-MCNC: 1.02 MG/DL (ref 0.6–1.3)
EST. AVERAGE GLUCOSE BLD GHB EST-MCNC: 126 MG/DL
GFR SERPL CREATININE-BSD FRML MDRD: 72 ML/MIN/1.73SQ M
GLUCOSE P FAST SERPL-MCNC: 95 MG/DL (ref 65–99)
HBA1C MFR BLD: 6 %
HDLC SERPL-MCNC: 45 MG/DL
LDLC SERPL CALC-MCNC: 97 MG/DL (ref 0–100)
POTASSIUM SERPL-SCNC: 4.2 MMOL/L (ref 3.5–5.3)
PROT SERPL-MCNC: 7.7 G/DL (ref 6.4–8.2)
SODIUM SERPL-SCNC: 139 MMOL/L (ref 136–145)
TRIGL SERPL-MCNC: 131 MG/DL

## 2020-10-15 PROCEDURE — 83036 HEMOGLOBIN GLYCOSYLATED A1C: CPT | Performed by: INTERNAL MEDICINE

## 2020-10-15 PROCEDURE — 80061 LIPID PANEL: CPT

## 2020-10-15 PROCEDURE — 36415 COLL VENOUS BLD VENIPUNCTURE: CPT | Performed by: INTERNAL MEDICINE

## 2020-10-15 PROCEDURE — 80053 COMPREHEN METABOLIC PANEL: CPT

## 2020-10-19 ENCOUNTER — OFFICE VISIT (OUTPATIENT)
Dept: INTERNAL MEDICINE CLINIC | Facility: CLINIC | Age: 75
End: 2020-10-19
Payer: MEDICARE

## 2020-10-19 VITALS
HEART RATE: 74 BPM | TEMPERATURE: 97.6 F | SYSTOLIC BLOOD PRESSURE: 132 MMHG | DIASTOLIC BLOOD PRESSURE: 74 MMHG | WEIGHT: 225.2 LBS | HEIGHT: 66 IN | OXYGEN SATURATION: 98 % | BODY MASS INDEX: 36.19 KG/M2 | RESPIRATION RATE: 18 BRPM

## 2020-10-19 DIAGNOSIS — E66.09 CLASS 2 OBESITY DUE TO EXCESS CALORIES WITHOUT SERIOUS COMORBIDITY WITH BODY MASS INDEX (BMI) OF 37.0 TO 37.9 IN ADULT: ICD-10-CM

## 2020-10-19 DIAGNOSIS — E78.2 MIXED HYPERLIPIDEMIA: ICD-10-CM

## 2020-10-19 DIAGNOSIS — Z23 NEED FOR INFLUENZA VACCINATION: ICD-10-CM

## 2020-10-19 DIAGNOSIS — Z00.00 HEALTH MAINTENANCE EXAMINATION: ICD-10-CM

## 2020-10-19 DIAGNOSIS — E78.1 ESSENTIAL HYPERTRIGLYCERIDEMIA: ICD-10-CM

## 2020-10-19 DIAGNOSIS — K21.9 GASTROESOPHAGEAL REFLUX DISEASE WITHOUT ESOPHAGITIS: ICD-10-CM

## 2020-10-19 DIAGNOSIS — R73.03 PREDIABETES: ICD-10-CM

## 2020-10-19 DIAGNOSIS — I10 ESSENTIAL HYPERTENSION: Primary | ICD-10-CM

## 2020-10-19 PROCEDURE — G0008 ADMIN INFLUENZA VIRUS VAC: HCPCS | Performed by: INTERNAL MEDICINE

## 2020-10-19 PROCEDURE — G0438 PPPS, INITIAL VISIT: HCPCS | Performed by: INTERNAL MEDICINE

## 2020-10-19 PROCEDURE — 90662 IIV NO PRSV INCREASED AG IM: CPT | Performed by: INTERNAL MEDICINE

## 2020-10-19 PROCEDURE — 1123F ACP DISCUSS/DSCN MKR DOCD: CPT | Performed by: INTERNAL MEDICINE

## 2020-10-19 PROCEDURE — 99214 OFFICE O/P EST MOD 30 MIN: CPT | Performed by: INTERNAL MEDICINE

## 2021-01-08 DIAGNOSIS — E78.2 MIXED HYPERLIPIDEMIA: ICD-10-CM

## 2021-01-08 RX ORDER — GEMFIBROZIL 600 MG/1
600 TABLET, FILM COATED ORAL 2 TIMES DAILY
Qty: 180 TABLET | Refills: 1 | Status: SHIPPED | OUTPATIENT
Start: 2021-01-08 | End: 2021-07-16 | Stop reason: SDUPTHER

## 2021-01-08 RX ORDER — PRAVASTATIN SODIUM 10 MG
10 TABLET ORAL DAILY
Qty: 90 TABLET | Refills: 1 | Status: SHIPPED | OUTPATIENT
Start: 2021-01-08 | End: 2021-06-27

## 2021-01-19 DIAGNOSIS — H81.319 AUDITORY VERTIGO, UNSPECIFIED LATERALITY: ICD-10-CM

## 2021-01-19 RX ORDER — TRIAMTERENE AND HYDROCHLOROTHIAZIDE 37.5; 25 MG/1; MG/1
1 CAPSULE ORAL DAILY
Qty: 90 CAPSULE | Refills: 1 | Status: SHIPPED | OUTPATIENT
Start: 2021-01-19 | End: 2021-07-16 | Stop reason: SDUPTHER

## 2021-02-12 DIAGNOSIS — Z23 ENCOUNTER FOR IMMUNIZATION: ICD-10-CM

## 2021-04-13 ENCOUNTER — TRANSCRIBE ORDERS (OUTPATIENT)
Dept: LAB | Facility: CLINIC | Age: 76
End: 2021-04-13

## 2021-04-13 ENCOUNTER — LAB (OUTPATIENT)
Dept: LAB | Facility: CLINIC | Age: 76
End: 2021-04-13
Payer: MEDICARE

## 2021-04-13 DIAGNOSIS — E78.2 MIXED HYPERLIPIDEMIA: ICD-10-CM

## 2021-04-13 DIAGNOSIS — I10 ESSENTIAL HYPERTENSION: ICD-10-CM

## 2021-04-13 DIAGNOSIS — R73.03 PREDIABETES: ICD-10-CM

## 2021-04-13 LAB
ALBUMIN SERPL BCP-MCNC: 4.2 G/DL (ref 3.5–5)
ALP SERPL-CCNC: 64 U/L (ref 46–116)
ALT SERPL W P-5'-P-CCNC: 33 U/L (ref 12–78)
ANION GAP SERPL CALCULATED.3IONS-SCNC: 7 MMOL/L (ref 4–13)
AST SERPL W P-5'-P-CCNC: 20 U/L (ref 5–45)
BASOPHILS # BLD AUTO: 0.04 THOUSANDS/ΜL (ref 0–0.1)
BASOPHILS NFR BLD AUTO: 1 % (ref 0–1)
BILIRUB SERPL-MCNC: 0.4 MG/DL (ref 0.2–1)
BUN SERPL-MCNC: 22 MG/DL (ref 5–25)
CALCIUM SERPL-MCNC: 9.1 MG/DL (ref 8.3–10.1)
CHLORIDE SERPL-SCNC: 103 MMOL/L (ref 100–108)
CHOLEST SERPL-MCNC: 191 MG/DL (ref 50–200)
CO2 SERPL-SCNC: 31 MMOL/L (ref 21–32)
CREAT SERPL-MCNC: 1.12 MG/DL (ref 0.6–1.3)
EOSINOPHIL # BLD AUTO: 0.33 THOUSAND/ΜL (ref 0–0.61)
EOSINOPHIL NFR BLD AUTO: 5 % (ref 0–6)
ERYTHROCYTE [DISTWIDTH] IN BLOOD BY AUTOMATED COUNT: 12.6 % (ref 11.6–15.1)
EST. AVERAGE GLUCOSE BLD GHB EST-MCNC: 126 MG/DL
GFR SERPL CREATININE-BSD FRML MDRD: 63 ML/MIN/1.73SQ M
GLUCOSE P FAST SERPL-MCNC: 99 MG/DL (ref 65–99)
HBA1C MFR BLD: 6 %
HCT VFR BLD AUTO: 46.3 % (ref 36.5–49.3)
HDLC SERPL-MCNC: 48 MG/DL
HGB BLD-MCNC: 15 G/DL (ref 12–17)
IMM GRANULOCYTES # BLD AUTO: 0.03 THOUSAND/UL (ref 0–0.2)
IMM GRANULOCYTES NFR BLD AUTO: 1 % (ref 0–2)
LDLC SERPL CALC-MCNC: 125 MG/DL (ref 0–100)
LYMPHOCYTES # BLD AUTO: 1.81 THOUSANDS/ΜL (ref 0.6–4.47)
LYMPHOCYTES NFR BLD AUTO: 29 % (ref 14–44)
MCH RBC QN AUTO: 30.9 PG (ref 26.8–34.3)
MCHC RBC AUTO-ENTMCNC: 32.4 G/DL (ref 31.4–37.4)
MCV RBC AUTO: 96 FL (ref 82–98)
MONOCYTES # BLD AUTO: 0.57 THOUSAND/ΜL (ref 0.17–1.22)
MONOCYTES NFR BLD AUTO: 9 % (ref 4–12)
NEUTROPHILS # BLD AUTO: 3.53 THOUSANDS/ΜL (ref 1.85–7.62)
NEUTS SEG NFR BLD AUTO: 55 % (ref 43–75)
NONHDLC SERPL-MCNC: 143 MG/DL
NRBC BLD AUTO-RTO: 0 /100 WBCS
PLATELET # BLD AUTO: 299 THOUSANDS/UL (ref 149–390)
PMV BLD AUTO: 10 FL (ref 8.9–12.7)
POTASSIUM SERPL-SCNC: 5 MMOL/L (ref 3.5–5.3)
PROT SERPL-MCNC: 7.4 G/DL (ref 6.4–8.2)
RBC # BLD AUTO: 4.85 MILLION/UL (ref 3.88–5.62)
SODIUM SERPL-SCNC: 141 MMOL/L (ref 136–145)
TRIGL SERPL-MCNC: 92 MG/DL
TSH SERPL DL<=0.05 MIU/L-ACNC: 2.26 UIU/ML (ref 0.36–3.74)
WBC # BLD AUTO: 6.31 THOUSAND/UL (ref 4.31–10.16)

## 2021-04-13 PROCEDURE — 36415 COLL VENOUS BLD VENIPUNCTURE: CPT

## 2021-04-13 PROCEDURE — 80053 COMPREHEN METABOLIC PANEL: CPT

## 2021-04-13 PROCEDURE — 85025 COMPLETE CBC W/AUTO DIFF WBC: CPT

## 2021-04-13 PROCEDURE — 83036 HEMOGLOBIN GLYCOSYLATED A1C: CPT

## 2021-04-13 PROCEDURE — 84443 ASSAY THYROID STIM HORMONE: CPT

## 2021-04-13 PROCEDURE — 80061 LIPID PANEL: CPT

## 2021-04-19 ENCOUNTER — OFFICE VISIT (OUTPATIENT)
Dept: INTERNAL MEDICINE CLINIC | Facility: CLINIC | Age: 76
End: 2021-04-19
Payer: MEDICARE

## 2021-04-19 VITALS
HEIGHT: 66 IN | HEART RATE: 76 BPM | BODY MASS INDEX: 36.64 KG/M2 | OXYGEN SATURATION: 95 % | DIASTOLIC BLOOD PRESSURE: 82 MMHG | TEMPERATURE: 97.1 F | SYSTOLIC BLOOD PRESSURE: 128 MMHG | WEIGHT: 228 LBS

## 2021-04-19 DIAGNOSIS — E78.1 ESSENTIAL HYPERTRIGLYCERIDEMIA: ICD-10-CM

## 2021-04-19 DIAGNOSIS — E66.09 CLASS 2 OBESITY DUE TO EXCESS CALORIES WITHOUT SERIOUS COMORBIDITY WITH BODY MASS INDEX (BMI) OF 37.0 TO 37.9 IN ADULT: ICD-10-CM

## 2021-04-19 DIAGNOSIS — K21.9 GASTROESOPHAGEAL REFLUX DISEASE WITHOUT ESOPHAGITIS: ICD-10-CM

## 2021-04-19 DIAGNOSIS — E78.2 MIXED HYPERLIPIDEMIA: ICD-10-CM

## 2021-04-19 DIAGNOSIS — R73.03 PREDIABETES: ICD-10-CM

## 2021-04-19 DIAGNOSIS — I10 ESSENTIAL HYPERTENSION: Primary | ICD-10-CM

## 2021-04-19 PROBLEM — G56.01 CARPAL TUNNEL SYNDROME, RIGHT: Status: ACTIVE | Noted: 2021-04-19

## 2021-04-19 PROBLEM — N18.2 CHRONIC KIDNEY DISEASE (CKD), STAGE II (MILD): Status: ACTIVE | Noted: 2021-04-19

## 2021-04-19 PROCEDURE — 99214 OFFICE O/P EST MOD 30 MIN: CPT | Performed by: INTERNAL MEDICINE

## 2021-04-19 NOTE — ASSESSMENT & PLAN NOTE
Lab Results   Component Value Date    EGFR 63 04/13/2021    EGFR 72 10/15/2020    EGFR 66 10/10/2019    CREATININE 1 12 04/13/2021    CREATININE 1 02 10/15/2020    CREATININE 1 10 10/10/2019     No NSAIDs

## 2021-04-19 NOTE — PROGRESS NOTES
Assessment/Plan:    Carpal tunnel syndrome, right  Use wrist brace qHS  Discussed Ortho referral     Mixed hyperlipidemia  LDL worse  On gemfibrozil and pravastatin  Consider stopping gemfibrozil  Prediabetes  A1c stable, at 6%  Essential hypertension  BP stable, on diuretics  Auditory vertigo  Intermittent symptoms  Reminded to eat regularly, stay hydrated  Chronic kidney disease (CKD), stage II (mild)  Lab Results   Component Value Date    EGFR 63 04/13/2021    EGFR 72 10/15/2020    EGFR 66 10/10/2019    CREATININE 1 12 04/13/2021    CREATININE 1 02 10/15/2020    CREATININE 1 10 10/10/2019     No NSAIDs  Laryngopharyngeal reflux (LPR)  Resolved, stopped PPI  Class 2 obesity due to excess calories without serious comorbidity with body mass index (BMI) of 37 0 to 37 9 in adult  Stable weight  Resume regular aerobic exercise  Diagnoses and all orders for this visit:    Essential hypertension    Gastroesophageal reflux disease without esophagitis    Class 2 obesity due to excess calories without serious comorbidity with body mass index (BMI) of 37 0 to 37 9 in adult    Mixed hyperlipidemia    Prediabetes  -     Hemoglobin A1C; Future    Essential hypertriglyceridemia  -     Comprehensive metabolic panel; Future  -     Lipid panel; Future      Follow up in 6 months or as needed  Subjective:      Patient ID: Genny Little is a 68 y o  male here for follow up  He complains of right thumb weakness and numbness, started a few months ago  He reports fingers sometimes affected also  He started to use his wife's wrist brace every night and symptoms have improved  If he does not use it for a few days, he noticed symptoms worsen  Denies any recent injury or falls, no weakness  He has been more sedentary during the pandemic, has not walked or exercised  He feels his lung function is better though, since he has been playing the trumpet more often      The following portions of the patient's history were reviewed and updated as appropriate: allergies, current medications, past medical history, past social history and problem list     Review of Systems   Constitutional: Positive for activity change  Negative for appetite change and fatigue  HENT: Negative for congestion, hearing loss and postnasal drip  Eyes: Negative  Respiratory: Negative for cough, chest tightness and shortness of breath  Cardiovascular: Negative for chest pain, palpitations and leg swelling  Gastrointestinal: Negative for abdominal pain and constipation  Genitourinary: Negative for dysuria, frequency and urgency  Musculoskeletal: Positive for arthralgias  Negative for gait problem and joint swelling  Skin: Negative for rash and wound  Neurological: Positive for light-headedness  Negative for dizziness, numbness and headaches  Psychiatric/Behavioral: Negative for sleep disturbance  The patient is not nervous/anxious  Objective:      /82   Pulse 76   Temp (!) 97 1 °F (36 2 °C)   Ht 5' 6" (1 676 m)   Wt 103 kg (228 lb)   SpO2 95%   BMI 36 80 kg/m²          Physical Exam  Vitals signs and nursing note reviewed  Constitutional:       Appearance: He is well-developed  HENT:      Head: Normocephalic and atraumatic  Eyes:      Conjunctiva/sclera: Conjunctivae normal       Pupils: Pupils are equal, round, and reactive to light  Neck:      Musculoskeletal: Neck supple  Cardiovascular:      Rate and Rhythm: Normal rate and regular rhythm  Heart sounds: Normal heart sounds  Pulmonary:      Effort: Pulmonary effort is normal       Breath sounds: No wheezing or rhonchi  Abdominal:      General: Bowel sounds are normal       Palpations: Abdomen is soft  Musculoskeletal:      Right hand: He exhibits no tenderness and no deformity  Normal sensation noted  Normal strength noted  Comments: (-) Tinel's R hand   Skin:     General: Skin is warm  Findings: No rash  Neurological:      General: No focal deficit present  Mental Status: He is alert and oriented to person, place, and time  Psychiatric:         Mood and Affect: Mood normal          Behavior: Behavior normal            Lab results reviewed with patient  BMI Counseling: Body mass index is 36 8 kg/m²  The BMI is above normal  Nutrition recommendations include reducing portion sizes, 3-5 servings of fruits/vegetables daily and increasing intake of lean protein  Exercise recommendations include moderate aerobic physical activity for 150 minutes/week

## 2021-06-26 DIAGNOSIS — E78.2 MIXED HYPERLIPIDEMIA: ICD-10-CM

## 2021-06-27 RX ORDER — PRAVASTATIN SODIUM 10 MG
TABLET ORAL
Qty: 90 TABLET | Refills: 1 | Status: SHIPPED | OUTPATIENT
Start: 2021-06-27 | End: 2021-12-23

## 2021-07-16 DIAGNOSIS — E78.2 MIXED HYPERLIPIDEMIA: ICD-10-CM

## 2021-07-16 DIAGNOSIS — H81.319 AUDITORY VERTIGO, UNSPECIFIED LATERALITY: ICD-10-CM

## 2021-07-16 RX ORDER — TRIAMTERENE AND HYDROCHLOROTHIAZIDE 37.5; 25 MG/1; MG/1
1 CAPSULE ORAL DAILY
Qty: 90 CAPSULE | Refills: 1 | Status: SHIPPED | OUTPATIENT
Start: 2021-07-16 | End: 2021-12-23

## 2021-07-16 RX ORDER — GEMFIBROZIL 600 MG/1
600 TABLET, FILM COATED ORAL 2 TIMES DAILY
Qty: 180 TABLET | Refills: 1 | Status: SHIPPED | OUTPATIENT
Start: 2021-07-16 | End: 2021-12-23

## 2021-10-12 ENCOUNTER — LAB (OUTPATIENT)
Dept: LAB | Facility: CLINIC | Age: 76
End: 2021-10-12
Payer: MEDICARE

## 2021-10-12 DIAGNOSIS — R73.03 PREDIABETES: ICD-10-CM

## 2021-10-12 DIAGNOSIS — E78.1 ESSENTIAL HYPERTRIGLYCERIDEMIA: ICD-10-CM

## 2021-10-12 LAB
ALBUMIN SERPL BCP-MCNC: 4.2 G/DL (ref 3.5–5)
ALP SERPL-CCNC: 60 U/L (ref 46–116)
ALT SERPL W P-5'-P-CCNC: 30 U/L (ref 12–78)
ANION GAP SERPL CALCULATED.3IONS-SCNC: 9 MMOL/L (ref 4–13)
AST SERPL W P-5'-P-CCNC: 27 U/L (ref 5–45)
BILIRUB SERPL-MCNC: 0.61 MG/DL (ref 0.2–1)
BUN SERPL-MCNC: 14 MG/DL (ref 5–25)
CALCIUM SERPL-MCNC: 9 MG/DL (ref 8.3–10.1)
CHLORIDE SERPL-SCNC: 102 MMOL/L (ref 100–108)
CHOLEST SERPL-MCNC: 182 MG/DL (ref 50–200)
CO2 SERPL-SCNC: 29 MMOL/L (ref 21–32)
CREAT SERPL-MCNC: 0.95 MG/DL (ref 0.6–1.3)
EST. AVERAGE GLUCOSE BLD GHB EST-MCNC: 126 MG/DL
GFR SERPL CREATININE-BSD FRML MDRD: 77 ML/MIN/1.73SQ M
GLUCOSE P FAST SERPL-MCNC: 97 MG/DL (ref 65–99)
HBA1C MFR BLD: 6 %
HDLC SERPL-MCNC: 46 MG/DL
LDLC SERPL CALC-MCNC: 107 MG/DL (ref 0–100)
NONHDLC SERPL-MCNC: 136 MG/DL
POTASSIUM SERPL-SCNC: 4.6 MMOL/L (ref 3.5–5.3)
PROT SERPL-MCNC: 7.5 G/DL (ref 6.4–8.2)
SODIUM SERPL-SCNC: 140 MMOL/L (ref 136–145)
TRIGL SERPL-MCNC: 144 MG/DL

## 2021-10-12 PROCEDURE — 80061 LIPID PANEL: CPT

## 2021-10-12 PROCEDURE — 80053 COMPREHEN METABOLIC PANEL: CPT

## 2021-10-12 PROCEDURE — 36415 COLL VENOUS BLD VENIPUNCTURE: CPT

## 2021-10-12 PROCEDURE — 83036 HEMOGLOBIN GLYCOSYLATED A1C: CPT

## 2021-10-19 ENCOUNTER — OFFICE VISIT (OUTPATIENT)
Dept: INTERNAL MEDICINE CLINIC | Facility: CLINIC | Age: 76
End: 2021-10-19
Payer: MEDICARE

## 2021-10-19 VITALS
DIASTOLIC BLOOD PRESSURE: 78 MMHG | TEMPERATURE: 96.8 F | HEART RATE: 62 BPM | OXYGEN SATURATION: 95 % | WEIGHT: 228 LBS | BODY MASS INDEX: 36.64 KG/M2 | SYSTOLIC BLOOD PRESSURE: 130 MMHG | HEIGHT: 66 IN

## 2021-10-19 DIAGNOSIS — Z71.9 HEALTH COUNSELING: ICD-10-CM

## 2021-10-19 DIAGNOSIS — E78.2 MIXED HYPERLIPIDEMIA: ICD-10-CM

## 2021-10-19 DIAGNOSIS — K21.9 LARYNGOPHARYNGEAL REFLUX (LPR): Primary | ICD-10-CM

## 2021-10-19 DIAGNOSIS — Z79.899 ENCOUNTER FOR LONG-TERM CURRENT USE OF MEDICATION: ICD-10-CM

## 2021-10-19 DIAGNOSIS — Z23 NEED FOR INFLUENZA VACCINATION: ICD-10-CM

## 2021-10-19 DIAGNOSIS — I10 ESSENTIAL HYPERTENSION: ICD-10-CM

## 2021-10-19 DIAGNOSIS — K21.9 GASTROESOPHAGEAL REFLUX DISEASE WITHOUT ESOPHAGITIS: ICD-10-CM

## 2021-10-19 DIAGNOSIS — R73.03 PREDIABETES: ICD-10-CM

## 2021-10-19 PROCEDURE — G0008 ADMIN INFLUENZA VIRUS VAC: HCPCS | Performed by: INTERNAL MEDICINE

## 2021-10-19 PROCEDURE — 99214 OFFICE O/P EST MOD 30 MIN: CPT | Performed by: INTERNAL MEDICINE

## 2021-10-19 PROCEDURE — 90662 IIV NO PRSV INCREASED AG IM: CPT | Performed by: INTERNAL MEDICINE

## 2021-10-19 RX ORDER — OMEPRAZOLE 20 MG/1
20 CAPSULE, DELAYED RELEASE ORAL DAILY
Qty: 90 CAPSULE | Refills: 0 | Status: SHIPPED | OUTPATIENT
Start: 2021-10-19 | End: 2022-01-14 | Stop reason: SDUPTHER

## 2021-12-23 DIAGNOSIS — E78.2 MIXED HYPERLIPIDEMIA: ICD-10-CM

## 2021-12-23 DIAGNOSIS — H81.319 AUDITORY VERTIGO, UNSPECIFIED LATERALITY: ICD-10-CM

## 2021-12-23 RX ORDER — GEMFIBROZIL 600 MG/1
TABLET, FILM COATED ORAL
Qty: 180 TABLET | Refills: 1 | Status: SHIPPED | OUTPATIENT
Start: 2021-12-23 | End: 2022-06-30

## 2021-12-23 RX ORDER — PRAVASTATIN SODIUM 10 MG
TABLET ORAL
Qty: 90 TABLET | Refills: 1 | Status: SHIPPED | OUTPATIENT
Start: 2021-12-23 | End: 2021-12-30 | Stop reason: SDUPTHER

## 2021-12-23 RX ORDER — TRIAMTERENE AND HYDROCHLOROTHIAZIDE 37.5; 25 MG/1; MG/1
CAPSULE ORAL
Qty: 90 CAPSULE | Refills: 1 | Status: SHIPPED | OUTPATIENT
Start: 2021-12-23 | End: 2022-06-30

## 2021-12-30 DIAGNOSIS — E78.2 MIXED HYPERLIPIDEMIA: ICD-10-CM

## 2021-12-30 RX ORDER — PRAVASTATIN SODIUM 10 MG
10 TABLET ORAL DAILY
Qty: 90 TABLET | Refills: 1 | Status: SHIPPED | OUTPATIENT
Start: 2021-12-30

## 2022-01-14 DIAGNOSIS — K21.9 LARYNGOPHARYNGEAL REFLUX (LPR): ICD-10-CM

## 2022-01-14 DIAGNOSIS — K21.9 GASTROESOPHAGEAL REFLUX DISEASE WITHOUT ESOPHAGITIS: ICD-10-CM

## 2022-01-14 RX ORDER — OMEPRAZOLE 20 MG/1
20 CAPSULE, DELAYED RELEASE ORAL DAILY
Qty: 90 CAPSULE | Refills: 0 | Status: SHIPPED | OUTPATIENT
Start: 2022-01-14 | End: 2022-03-23

## 2022-03-23 DIAGNOSIS — K21.9 LARYNGOPHARYNGEAL REFLUX (LPR): ICD-10-CM

## 2022-03-23 DIAGNOSIS — K21.9 GASTROESOPHAGEAL REFLUX DISEASE WITHOUT ESOPHAGITIS: ICD-10-CM

## 2022-03-23 RX ORDER — OMEPRAZOLE 20 MG/1
CAPSULE, DELAYED RELEASE ORAL
Qty: 90 CAPSULE | Refills: 0 | Status: SHIPPED | OUTPATIENT
Start: 2022-03-23 | End: 2022-06-30

## 2022-04-13 LAB
ALBUMIN SERPL-MCNC: 4.8 G/DL (ref 3.6–5.1)
ALBUMIN/GLOB SERPL: 2.1 (CALC) (ref 1–2.5)
ALP SERPL-CCNC: 57 U/L (ref 35–144)
ALT SERPL-CCNC: 18 U/L (ref 9–46)
AST SERPL-CCNC: 21 U/L (ref 10–35)
BASOPHILS # BLD AUTO: 32 CELLS/UL (ref 0–200)
BASOPHILS NFR BLD AUTO: 0.6 %
BILIRUB SERPL-MCNC: 0.6 MG/DL (ref 0.2–1.2)
BUN SERPL-MCNC: 17 MG/DL (ref 7–25)
BUN/CREAT SERPL: NORMAL (CALC) (ref 6–22)
CALCIUM SERPL-MCNC: 9.9 MG/DL (ref 8.6–10.3)
CHLORIDE SERPL-SCNC: 98 MMOL/L (ref 98–110)
CHOLEST SERPL-MCNC: 177 MG/DL
CHOLEST/HDLC SERPL: 4.5 (CALC)
CO2 SERPL-SCNC: 30 MMOL/L (ref 20–32)
CREAT SERPL-MCNC: 0.99 MG/DL (ref 0.7–1.18)
EOSINOPHIL # BLD AUTO: 281 CELLS/UL (ref 15–500)
EOSINOPHIL NFR BLD AUTO: 5.2 %
ERYTHROCYTE [DISTWIDTH] IN BLOOD BY AUTOMATED COUNT: 12.5 % (ref 11–15)
GLOBULIN SER CALC-MCNC: 2.3 G/DL (CALC) (ref 1.9–3.7)
GLUCOSE SERPL-MCNC: 95 MG/DL (ref 65–99)
HBA1C MFR BLD: 5.9 % OF TOTAL HGB
HCT VFR BLD AUTO: 43.3 % (ref 38.5–50)
HDLC SERPL-MCNC: 39 MG/DL
HGB BLD-MCNC: 15.3 G/DL (ref 13.2–17.1)
LDLC SERPL CALC-MCNC: 110 MG/DL (CALC)
LYMPHOCYTES # BLD AUTO: 1966 CELLS/UL (ref 850–3900)
LYMPHOCYTES NFR BLD AUTO: 36.4 %
MCH RBC QN AUTO: 31.6 PG (ref 27–33)
MCHC RBC AUTO-ENTMCNC: 35.3 G/DL (ref 32–36)
MCV RBC AUTO: 89.5 FL (ref 80–100)
MONOCYTES # BLD AUTO: 362 CELLS/UL (ref 200–950)
MONOCYTES NFR BLD AUTO: 6.7 %
NEUTROPHILS # BLD AUTO: 2759 CELLS/UL (ref 1500–7800)
NEUTROPHILS NFR BLD AUTO: 51.1 %
NONHDLC SERPL-MCNC: 138 MG/DL (CALC)
PLATELET # BLD AUTO: 300 THOUSAND/UL (ref 140–400)
PMV BLD REES-ECKER: 10.9 FL (ref 7.5–12.5)
POTASSIUM SERPL-SCNC: 4.6 MMOL/L (ref 3.5–5.3)
PROT SERPL-MCNC: 7.1 G/DL (ref 6.1–8.1)
RBC # BLD AUTO: 4.84 MILLION/UL (ref 4.2–5.8)
SL AMB EGFR AFRICAN AMERICAN: 85 ML/MIN/1.73M2
SL AMB EGFR NON AFRICAN AMERICAN: 73 ML/MIN/1.73M2
SODIUM SERPL-SCNC: 139 MMOL/L (ref 135–146)
TRIGL SERPL-MCNC: 164 MG/DL
TSH SERPL-ACNC: 3.14 MIU/L (ref 0.4–4.5)
VIT B12 SERPL-MCNC: 285 PG/ML (ref 200–1100)
WBC # BLD AUTO: 5.4 THOUSAND/UL (ref 3.8–10.8)

## 2022-04-19 ENCOUNTER — OFFICE VISIT (OUTPATIENT)
Dept: INTERNAL MEDICINE CLINIC | Facility: CLINIC | Age: 77
End: 2022-04-19
Payer: MEDICARE

## 2022-04-19 VITALS
SYSTOLIC BLOOD PRESSURE: 126 MMHG | HEART RATE: 65 BPM | BODY MASS INDEX: 36.96 KG/M2 | HEIGHT: 66 IN | OXYGEN SATURATION: 96 % | WEIGHT: 230 LBS | DIASTOLIC BLOOD PRESSURE: 80 MMHG | TEMPERATURE: 97 F

## 2022-04-19 DIAGNOSIS — E66.09 CLASS 2 OBESITY DUE TO EXCESS CALORIES WITHOUT SERIOUS COMORBIDITY WITH BODY MASS INDEX (BMI) OF 37.0 TO 37.9 IN ADULT: ICD-10-CM

## 2022-04-19 DIAGNOSIS — E53.8 VITAMIN B12 DEFICIENCY: ICD-10-CM

## 2022-04-19 DIAGNOSIS — Z00.00 HEALTH MAINTENANCE EXAMINATION: ICD-10-CM

## 2022-04-19 DIAGNOSIS — N18.2 CHRONIC KIDNEY DISEASE (CKD), STAGE II (MILD): ICD-10-CM

## 2022-04-19 DIAGNOSIS — R73.03 PREDIABETES: ICD-10-CM

## 2022-04-19 DIAGNOSIS — K21.9 LARYNGOPHARYNGEAL REFLUX (LPR): ICD-10-CM

## 2022-04-19 DIAGNOSIS — I10 ESSENTIAL HYPERTENSION: ICD-10-CM

## 2022-04-19 DIAGNOSIS — M25.512 ACUTE PAIN OF LEFT SHOULDER: Primary | ICD-10-CM

## 2022-04-19 DIAGNOSIS — E78.1 ESSENTIAL HYPERTRIGLYCERIDEMIA: ICD-10-CM

## 2022-04-19 PROCEDURE — 99214 OFFICE O/P EST MOD 30 MIN: CPT | Performed by: INTERNAL MEDICINE

## 2022-04-19 PROCEDURE — G0439 PPPS, SUBSEQ VISIT: HCPCS | Performed by: INTERNAL MEDICINE

## 2022-04-19 PROCEDURE — 96372 THER/PROPH/DIAG INJ SC/IM: CPT

## 2022-04-19 RX ORDER — CYANOCOBALAMIN 1000 UG/ML
1000 INJECTION INTRAMUSCULAR; SUBCUTANEOUS
Status: SHIPPED | OUTPATIENT
Start: 2022-04-19 | End: 2022-08-17

## 2022-04-19 RX ADMIN — CYANOCOBALAMIN 1000 MCG: 1000 INJECTION INTRAMUSCULAR; SUBCUTANEOUS at 12:32
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
## 2022-04-19 NOTE — PROGRESS NOTES
Assessment/Plan:    Laryngopharyngeal reflux (LPR)  Still takingdaily PPI  Hoarseness has improved  Essential hypertension  BP stable, on diuretics  Chronic kidney disease (CKD), stage II (mild)  Stable  Mixed hyperlipidemia  TG, LDL slightly worse  Continue statin and gemfibrozil  Class 2 obesity due to excess calories without serious comorbidity with body mass index (BMI) of 37 0 to 37 9 in adult  No weight change  Prediabetes  A1c 5 9%  Vitamin B12 deficiency  B12 injection today, schedule monthly x 3  Start oral supplement  Diagnoses and all orders for this visit:    Acute pain of left shoulder  Comments:  Suspect rotator cuff pathology, ?tear  Declined Ortho evaluation, agrees to go to physical therapy  Orders:  -     Ambulatory Referral to Physical Therapy; Future    Essential hypertension  -     Comprehensive metabolic panel; Future    Essential hypertriglyceridemia  -     Lipid panel; Future    Laryngopharyngeal reflux (LPR)    Prediabetes  -     Hemoglobin A1C; Future    Vitamin B12 deficiency  -     cyanocobalamin (VITAMIN B-12) 1000 MCG tablet; Take 1 tablet (1,000 mcg total) by mouth daily  -     cyanocobalamin injection 1,000 mcg  -     Vitamin B12; Future    Class 2 obesity due to excess calories without serious comorbidity with body mass index (BMI) of 37 0 to 37 9 in adult    Chronic kidney disease (CKD), stage II (mild)    Health maintenance examination  Comments:  Updated  Follow up in 6 months or as needed  Subjective:      Patient ID: [place] Cortes is a 68 y o  male here for a follow up  About a month ago, he injured his left shoulder while playing volleyball  He was still able to finish the game but the next day, he was unable to do any overhead activities  He had a applied ice and heat as needed since then  He reports minimal pain, mainly complains of limited motion  He has a difficult time getting dressed and putting on deodorant      He has been taking the reflux medication  He noticed that his voice is not as hoarse or is able to speak longer without change in his voice  He continues to have trouble with reading glasses, his eye doctor said it has not worsened  Denies any chest pain or shortness of breath  He reports no neck pain or upper back pain, no headaches  The following portions of the patient's history were reviewed and updated as appropriate: allergies, current medications, past medical history, past social history and problem list     Review of Systems   Constitutional: Negative for activity change, appetite change and fatigue  HENT: Positive for voice change  Negative for congestion, hearing loss, postnasal drip and trouble swallowing  Eyes: Negative  Respiratory: Negative for cough, chest tightness and shortness of breath  Cardiovascular: Negative for chest pain, palpitations and leg swelling  Gastrointestinal: Negative for abdominal pain and constipation  Genitourinary: Negative for dysuria, frequency and urgency  Musculoskeletal: Positive for arthralgias  Skin: Negative for rash and wound  Neurological: Negative for dizziness, numbness and headaches  Hematological: Negative for adenopathy  Does not bruise/bleed easily  Psychiatric/Behavioral: Negative for sleep disturbance  The patient is not nervous/anxious  Objective:      /80   Pulse 65   Temp (!) 97 °F (36 1 °C)   Ht 5' 6" (1 676 m)   Wt 104 kg (230 lb)   SpO2 96%   BMI 37 12 kg/m²          Physical Exam  Vitals and nursing note reviewed  Constitutional:       Appearance: He is well-developed  HENT:      Head: Normocephalic and atraumatic  Eyes:      Conjunctiva/sclera: Conjunctivae normal       Pupils: Pupils are equal, round, and reactive to light  Cardiovascular:      Rate and Rhythm: Normal rate and regular rhythm  Heart sounds: Normal heart sounds     Pulmonary:      Effort: Pulmonary effort is normal  Breath sounds: No wheezing or rhonchi  Abdominal:      General: Bowel sounds are normal       Palpations: Abdomen is soft  Musculoskeletal:         General: No swelling  Right shoulder: Normal       Left shoulder: No swelling, deformity, tenderness or bony tenderness  Decreased range of motion  Cervical back: Neck supple  Right lower leg: No edema  Left lower leg: No edema  Skin:     General: Skin is warm  Findings: No rash  Neurological:      General: No focal deficit present  Mental Status: He is alert and oriented to person, place, and time  Psychiatric:         Mood and Affect: Mood and affect normal          Behavior: Behavior normal            Lab results reviewed with patient

## 2022-04-27 ENCOUNTER — EVALUATION (OUTPATIENT)
Dept: PHYSICAL THERAPY | Facility: CLINIC | Age: 77
End: 2022-04-27
Payer: MEDICARE

## 2022-04-27 DIAGNOSIS — M25.512 ACUTE PAIN OF LEFT SHOULDER: ICD-10-CM

## 2022-04-27 PROCEDURE — 97110 THERAPEUTIC EXERCISES: CPT | Performed by: PHYSICAL THERAPIST

## 2022-04-27 PROCEDURE — 97161 PT EVAL LOW COMPLEX 20 MIN: CPT | Performed by: PHYSICAL THERAPIST

## 2022-04-27 PROCEDURE — 97112 NEUROMUSCULAR REEDUCATION: CPT | Performed by: PHYSICAL THERAPIST

## 2022-04-27 NOTE — PROGRESS NOTES
PT Evaluation     Today's date: 2022  Patient name: Any Carbajal  : 1945  MRN: [id number]  Referring provider: Maura Anderson MD  Dx:   Encounter Diagnosis     ICD-10-CM    1  Acute pain of left shoulder  [id number] [id number] Ambulatory Referral to Physical Therapy    Suspect rotator cuff pathology, ?tear  Declined Ortho evaluation, agrees to go to physical therapy  Assessment  Assessment details: Pt presents with s/s consistent with L subacromial impingement however DEDRA and findings of ER weakness and limited active FE suggest a possible RTC tear as well  He will benefit from skilled PT services to address his impairments in order to decrease pain and restore function; if no significant improvement after 6 wks pt will benefit from referral to ortho surgery  Impairments: abnormal muscle firing, abnormal or restricted ROM, abnormal movement, activity intolerance, impaired physical strength, lacks appropriate home exercise program, pain with function, poor posture  and poor body mechanics  Understanding of Dx/Px/POC: good   Prognosis: fair    Goals  STG - 4 wks  1) pt will demonstrate 120 deg active FE  2) pt will improve pain 50%    LTG - 8-12 wks  1) pt will demonstrate full AROM  2) pt will improve pain 90%    Plan  Planned modality interventions: low level laser therapy  Planned therapy interventions: joint mobilization, manual therapy, body mechanics training, neuromuscular re-education, strengthening, stretching, patient education, postural training, therapeutic activities, therapeutic exercise, home exercise program, functional ROM exercises and flexibility  Frequency: 2x week  Duration in weeks: 6  Treatment plan discussed with: patient        Subjective Evaluation    History of Present Illness  Mechanism of injury: Pt is a 68 y o  male with PMHx significant for HTN, CKD II   He reports sudden onset of L superior and lateral shoulder and upper arm pain 3 wks ago when he reached back suddenly for a ball during a volleyball game  He was able to continue playing with mild pain but c/o significant pain the next morning upon waking  Pain  Current pain ratin  At best pain ratin  At worst pain ratin  Location: superior, lateral L shoulder and upper arm  Quality: sharp  Relieving factors: rest and heat  Aggravating factors: overhead activity  Progression: no change    Hand dominance: right      Diagnostic Tests  No diagnostic tests performed  Treatments  No previous or current treatments  Patient Goals  Patient goals for therapy: decreased pain, increased motion and return to sport/leisure activities          Objective     Postural Observations  Seated posture: poor  Correction of posture: has no consistent effect        Active Range of Motion   Left Shoulder   Flexion: 80 degrees with pain  Abduction: 60 degrees with pain  External rotation BTH: Active external rotation behind the head: cheek  with pain  Internal rotation BTB: L2     Right Shoulder   Flexion: 150 degrees   Abduction: 150 degrees   External rotation BTH: C7   Internal rotation BTB: sacrum     Passive Range of Motion   Left Shoulder   Flexion: 140 degrees with pain  Abduction: 120 degrees with pain  External rotation 45°: 60 degrees   Internal rotation 45°: 60 degrees     Right Shoulder   Flexion: 160 degrees   Abduction: 150 degrees   External rotation 90°: 100 degrees   Internal rotation 90°: 40 degrees     Strength/Myotome Testing     Left Shoulder     Planes of Motion   Flexion: 3-   Abduction: 3-   External rotation at 0°: 4-   Internal rotation at 0°: 4+     Right Shoulder     Planes of Motion   Flexion: 4+   Abduction: 4+   External rotation at 0°: 4+   Internal rotation at 0°: 4+     Tests     Left Shoulder   Positive Hawkin's and Neer's  Negative apprehension, drop arm, external rotation lag sign and painful arc  Precautions:  PMHx: HTN, CKD II  Dx: R subacromial impingement, possible RTC tear    Manuals 4/27            Laser L subacromial space 6000J            L shoulder PROM                                       Neuro Re-Ed             scap retraction 5"x10            TB rows GTB  3x10 GTB  3x20           Table slides FF 1x15 3x15                                                               Ther Ex             Pulleys: FF, ABD  5"x10 ea           TB ER RTB  2x10 RTB  3x12           SL ER                                                                              Ther Activity                                       Gait Training                                       Modalities

## 2022-05-02 ENCOUNTER — OFFICE VISIT (OUTPATIENT)
Dept: PHYSICAL THERAPY | Facility: CLINIC | Age: 77
End: 2022-05-02
Payer: MEDICARE

## 2022-05-02 DIAGNOSIS — M25.512 ACUTE PAIN OF LEFT SHOULDER: Primary | ICD-10-CM

## 2022-05-02 PROCEDURE — 97110 THERAPEUTIC EXERCISES: CPT | Performed by: PHYSICAL THERAPIST

## 2022-05-02 PROCEDURE — 97112 NEUROMUSCULAR REEDUCATION: CPT | Performed by: PHYSICAL THERAPIST

## 2022-05-02 NOTE — PROGRESS NOTES
Daily Note     Today's date: 2022  Patient name: Krystle Madrid  : 1945  MRN: [id number]  Referring provider: Patricia Velarde MD  Dx:   Encounter Diagnosis     ICD-10-CM    1  Acute pain of left shoulder  M25 512                   Subjective: Pt reports good compliance with HEP but notes aggravating his symptoms with repeated reaching out to the side while cooking a few days ago  Objective: See treatment diary below    Assessment: Pt demonstrated mildly improved scapular stability and ER strength but limited tolerance to ABD>FF ROM  Plan: Add AAROM nv to improve arthrokinematics  Precautions:  PMHx: HTN, CKD II  Dx: R subacromial impingement, possible RTC tear    Manuals            Laser L subacromial space 6000J 6000J           L shoulder PROM  5 min                                     Neuro Re-Ed             scap retraction 5"x10            TB rows GTB  3x10 GTB  3x20           Table slides FF 1x15 3x15           Standing scaption AAROM   1x10                                                 Ther Ex             Pulleys: FF, ABD  5"x10 ea           TB ER RTB  2x10 RTB  3x12 RTB  3x15          SL ER                                                                              Ther Activity                                       Gait Training                                       Modalities

## 2022-05-04 ENCOUNTER — OFFICE VISIT (OUTPATIENT)
Dept: PHYSICAL THERAPY | Facility: CLINIC | Age: 77
End: 2022-05-04
Payer: MEDICARE

## 2022-05-04 DIAGNOSIS — M25.512 ACUTE PAIN OF LEFT SHOULDER: Primary | ICD-10-CM

## 2022-05-04 PROCEDURE — 97110 THERAPEUTIC EXERCISES: CPT

## 2022-05-04 PROCEDURE — 97112 NEUROMUSCULAR REEDUCATION: CPT

## 2022-05-04 PROCEDURE — 97140 MANUAL THERAPY 1/> REGIONS: CPT

## 2022-05-04 NOTE — PROGRESS NOTES
Daily Note     Today's date: 2022  Patient name: Stevo Noriega  : 1945  MRN: [id number]  Referring provider: Mehnaz Solo MD  Dx:   Encounter Diagnosis     ICD-10-CM    1  Acute pain of left shoulder  M25 512                   Subjective: Pt reports decreased left shoulder pain with daily activities and improved motion with reaching up  Objective: See treatment diary below    Assessment: Pt demonstrated difficulty limiting UT compensation with AAROM and shoulder rows  Pt demonstrated moderate restriction with shoulder flexion > abduction  Plan: Add AAROM nv to HEP  Precautions:  PMHx: HTN, CKD II  Dx: R subacromial impingement, possible RTC tear    Manuals           Laser L subacromial space 6000J 6000J 6000J          L shoulder PROM  5 min 5 min                                    Neuro Re-Ed             scap retraction 5"x10            TB rows GTB  3x10 GTB  3x20 GTB  3x20          Table slides FF 1x15 3x15 3x15          Standing scaption AAROM   1x10                                                 Ther Ex             Pulleys: FF, ABD  5"x10 ea 5"x10 ea          TB ER RTB  2x10 RTB  3x12 RTB  3x15          SL ER                                                                              Ther Activity                                       Gait Training                                       Modalities

## 2022-05-09 ENCOUNTER — OFFICE VISIT (OUTPATIENT)
Dept: PHYSICAL THERAPY | Facility: CLINIC | Age: 77
End: 2022-05-09
Payer: MEDICARE

## 2022-05-09 DIAGNOSIS — M25.512 ACUTE PAIN OF LEFT SHOULDER: Primary | ICD-10-CM

## 2022-05-09 PROCEDURE — 97110 THERAPEUTIC EXERCISES: CPT | Performed by: PHYSICAL THERAPIST

## 2022-05-09 PROCEDURE — 97112 NEUROMUSCULAR REEDUCATION: CPT | Performed by: PHYSICAL THERAPIST

## 2022-05-09 NOTE — PROGRESS NOTES
Daily Note     Today's date: 2022  Patient name: Aparna Mccoy  : 1945  MRN: [id number]  Referring provider: Gregory Starks MD  Dx:   Encounter Diagnosis     ICD-10-CM    1  Acute pain of left shoulder  M25 512                   Subjective: Pt reports mildly improved FE  Objective: See treatment diary below  AROM: FF: 90 deg, ABD: 80 deg    Assessment: Pt demonstrated mildly improved AROM  Plan: Cont  POC  Precautions:  PMHx: HTN, CKD II  Dx: R subacromial impingement, possible RTC tear    Manuals          Laser L subacromial space 6000J 6000J 6000J 6000J         L shoulder PROM  5 min 5 min 5 min                                   Neuro Re-Ed             scap retraction 5"x10            TB rows GTB  3x10 GTB  3x20 GTB  3x20 GTB  3x25         Table slides FF 1x15 3x15 3x15 3x15         Standing scaption AAROM   1x10 1x10                                                Ther Ex             Pulleys: FF, ABD  5"x10 ea 5"x10 ea 5"x10 ea         TB ER RTB  2x10 RTB  3x12 RTB  3x15 RTB  3x20         SL ER                                                                              Ther Activity                                       Gait Training                                       Modalities

## 2022-05-11 ENCOUNTER — OFFICE VISIT (OUTPATIENT)
Dept: PHYSICAL THERAPY | Facility: CLINIC | Age: 77
End: 2022-05-11
Payer: MEDICARE

## 2022-05-11 DIAGNOSIS — M25.512 ACUTE PAIN OF LEFT SHOULDER: Primary | ICD-10-CM

## 2022-05-11 PROCEDURE — 97110 THERAPEUTIC EXERCISES: CPT

## 2022-05-11 PROCEDURE — 97140 MANUAL THERAPY 1/> REGIONS: CPT

## 2022-05-11 PROCEDURE — 97112 NEUROMUSCULAR REEDUCATION: CPT

## 2022-05-11 NOTE — PROGRESS NOTES
Daily Note     Today's date: 2022  Patient name: Gallito Gunn  : 1945  MRN: [id number]  Referring provider: Ramona Jay MD  Dx:   Encounter Diagnosis     ICD-10-CM    1  Acute pain of left shoulder  M25 512                   Subjective: Pt reports pain and difficulty with lateral reaching  Objective: See treatment diary below    Assessment: Pt demonstrated improved scapular stability but continued moderate ROM restrictions with shoulder elevation  Plan: Cont  POC  Precautions:  PMHx: HTN, CKD II  Dx: R subacromial impingement, possible RTC tear    Manuals         Laser L subacromial space 6000J 6000J 6000J 6000J 6000J        L shoulder PROM  5 min 5 min 5 min 5 min                                  Neuro Re-Ed             scap retraction 5"x10            TB rows GTB  3x10 GTB  3x20 GTB  3x20 GTB  3x25 GTB  3x25        Table slides FF 1x15 3x15 3x15 3x15 3x15        Standing scaption AAROM   1x10 1x10 2x10                                               Ther Ex             Pulleys: FF, ABD  5"x10 ea 5"x10 ea 5"x10 ea 5"x10 ea        TB ER RTB  2x10 RTB  3x12 RTB  3x15 RTB  3x20 RTB  3x20        SL ER                                                                              Ther Activity                                       Gait Training                                       Modalities

## 2022-05-16 ENCOUNTER — APPOINTMENT (OUTPATIENT)
Dept: PHYSICAL THERAPY | Facility: CLINIC | Age: 77
End: 2022-05-16
Payer: MEDICARE

## 2022-05-17 ENCOUNTER — APPOINTMENT (OUTPATIENT)
Dept: PHYSICAL THERAPY | Facility: CLINIC | Age: 77
End: 2022-05-17
Payer: MEDICARE

## 2022-05-19 ENCOUNTER — APPOINTMENT (OUTPATIENT)
Dept: PHYSICAL THERAPY | Facility: CLINIC | Age: 77
End: 2022-05-19
Payer: MEDICARE

## 2022-05-23 ENCOUNTER — OFFICE VISIT (OUTPATIENT)
Dept: PHYSICAL THERAPY | Facility: CLINIC | Age: 77
End: 2022-05-23
Payer: MEDICARE

## 2022-05-23 DIAGNOSIS — M25.512 ACUTE PAIN OF LEFT SHOULDER: Primary | ICD-10-CM

## 2022-05-23 PROCEDURE — 97112 NEUROMUSCULAR REEDUCATION: CPT | Performed by: PHYSICAL THERAPIST

## 2022-05-23 PROCEDURE — 97110 THERAPEUTIC EXERCISES: CPT | Performed by: PHYSICAL THERAPIST

## 2022-05-23 NOTE — PROGRESS NOTES
Daily Note     Today's date: 2022  Patient name: Gonzalez Foster  : 1945  MRN: [id number]  Referring provider: Oleg Rodgers MD  Dx:   Encounter Diagnosis     ICD-10-CM    1  Acute pain of left shoulder  M25 512                   Subjective: Pt reports improved ROM vs pain  Objective: See treatment diary below    Assessment: Pt demonstrated less pain and better ROM and arthrokinematics following MWM as well as cues to maintain scapular retraction during elevation  Plan: Cont  POC  Precautions:  PMHx: HTN, CKD II  Dx: R subacromial impingement, possible RTC tear    Manuals        Laser L subacromial space 6000J 6000J 6000J 6000J 6000J 6000J       L shoulder PROM  5 min 5 min 5 min 5 min 5 min       Standing FF scap assist MWM      1x10                    Neuro Re-Ed             scap retraction 5"x10            TB rows GTB  3x10 GTB  3x20 GTB  3x20 GTB  3x25 GTB  3x25 Blue  3x15       Table slides FF 1x15 3x15 3x15 3x15 3x15 3x15       Standing scaption AAROM   1x10 1x10 2x10        Standing FF, ABD AAROM      1x10 ea                                 Ther Ex             Pulleys: FF, ABD  5"x10 ea 5"x10 ea 5"x10 ea 5"x10 ea 5"x15 ea       TB ER RTB  2x10 RTB  3x12 RTB  3x15 RTB  3x20 RTB  3x20 GTB  3x10       SL ER                                                                              Ther Activity                                       Gait Training                                       Modalities

## 2022-05-24 ENCOUNTER — CLINICAL SUPPORT (OUTPATIENT)
Dept: INTERNAL MEDICINE CLINIC | Facility: CLINIC | Age: 77
End: 2022-05-24
Payer: MEDICARE

## 2022-05-24 DIAGNOSIS — E53.8 VITAMIN B12 DEFICIENCY: ICD-10-CM

## 2022-05-24 PROCEDURE — 96372 THER/PROPH/DIAG INJ SC/IM: CPT

## 2022-05-24 RX ADMIN — CYANOCOBALAMIN 1000 MCG: 1000 INJECTION INTRAMUSCULAR; SUBCUTANEOUS at 14:04

## 2022-05-25 ENCOUNTER — OFFICE VISIT (OUTPATIENT)
Dept: PHYSICAL THERAPY | Facility: CLINIC | Age: 77
End: 2022-05-25
Payer: MEDICARE

## 2022-05-25 DIAGNOSIS — M25.512 ACUTE PAIN OF LEFT SHOULDER: Primary | ICD-10-CM

## 2022-05-25 PROCEDURE — 97112 NEUROMUSCULAR REEDUCATION: CPT

## 2022-05-25 PROCEDURE — 97110 THERAPEUTIC EXERCISES: CPT

## 2022-05-25 NOTE — PROGRESS NOTES
Daily Note     Today's date: 2022  Patient name: Jaden Patton  : 1945  MRN: [id number]  Referring provider: Kirsten Dos Santos MD  Dx:   Encounter Diagnosis     ICD-10-CM    1  Acute pain of left shoulder  M25 512        Start Time: 1055  Stop Time: 1135  Total time in clinic (min): 40 minutes    Subjective: Patient reports improvements with therapy  No pain at rest  Minor pain with OH motion TE  Objective: See treatment diary below    Assessment: Patient's progressing well with PROM  Minimal UT compensation during cane AAROM  Plan: Cont  POC  Precautions:  PMHx: HTN, CKD II  Dx: R subacromial impingement, possible RTC tear    Manuals       Laser L subacromial space 6000J 6000J 6000J 6000J 6000J 6000J 6000J      L shoulder PROM  5 min 5 min 5 min 5 min 5 min 5 min      Standing FF scap assist MWM      1x10                    Neuro Re-Ed             scap retraction 5"x10            TB rows GTB  3x10 GTB  3x20 GTB  3x20 GTB  3x25 GTB  3x25 Blue  3x15 Blue  3x15      Table slides FF 1x15 3x15 3x15 3x15 3x15 3x15 3x15      Standing scaption AAROM   1x10 1x10 2x10        Standing FF, ABD AAROM      1x10 ea 1x10 ea                                Ther Ex             Pulleys: FF, ABD  5"x10 ea 5"x10 ea 5"x10 ea 5"x10 ea 5"x15 ea 5"x15 ea      TB ER RTB  2x10 RTB  3x12 RTB  3x15 RTB  3x20 RTB  3x20 GTB  3x10 GTB  3x10      SL ER                                                                              Ther Activity                                       Gait Training                                       Modalities

## 2022-06-01 ENCOUNTER — OFFICE VISIT (OUTPATIENT)
Dept: PHYSICAL THERAPY | Facility: CLINIC | Age: 77
End: 2022-06-01
Payer: MEDICARE

## 2022-06-01 DIAGNOSIS — M25.512 ACUTE PAIN OF LEFT SHOULDER: Primary | ICD-10-CM

## 2022-06-01 PROCEDURE — 97110 THERAPEUTIC EXERCISES: CPT

## 2022-06-01 PROCEDURE — 97112 NEUROMUSCULAR REEDUCATION: CPT

## 2022-06-01 NOTE — PROGRESS NOTES
Daily Note     Today's date: 2022  Patient name: Ly Chou  : 1945  MRN: [id number]  Referring provider: Taryn Long MD  Dx:   Encounter Diagnosis     ICD-10-CM    1  Acute pain of left shoulder  M25 512        Start Time: 1045  Stop Time: 1130  Total time in clinic (min): 45 minutes    Subjective: Patient reports having one night of pain free sleeping since last visit  Steady improvements thus far  Objective: See treatment diary below    Assessment: Patient required cueing for TB rows and ER  PROM still limited by pain but progressing  Plan: Cont  POC  Precautions:  PMHx: HTN, CKD II  Dx: R subacromial impingement, possible RTC tear    Manuals      Laser L subacromial space 6000J 6000J 6000J 6000J 6000J 6000J 6000J 6000J     L shoulder PROM  5 min 5 min 5 min 5 min 5 min 5 min 5 min     Standing FF scap assist MWM      1x10                    Neuro Re-Ed             scap retraction 5"x10            TB rows GTB  3x10 GTB  3x20 GTB  3x20 GTB  3x25 GTB  3x25 Blue  3x15 Blue  3x15 Blue  3x15     Table slides FF 1x15 3x15 3x15 3x15 3x15 3x15 3x15 3x15     Standing scaption AAROM   1x10 1x10 2x10        Standing FF, ABD AAROM      1x10 ea 1x10 ea 1x10 ea                               Ther Ex             Pulleys: FF, ABD  5"x10 ea 5"x10 ea 5"x10 ea 5"x10 ea 5"x15 ea 5"x15 ea 5"x15 ea     TB ER RTB  2x10 RTB  3x12 RTB  3x15 RTB  3x20 RTB  3x20 GTB  3x10 GTB  3x10 GTB  3x10     SL ER                                                                              Ther Activity                                       Gait Training                                       Modalities

## 2022-06-03 ENCOUNTER — OFFICE VISIT (OUTPATIENT)
Dept: PHYSICAL THERAPY | Facility: CLINIC | Age: 77
End: 2022-06-03
Payer: MEDICARE

## 2022-06-03 DIAGNOSIS — M25.512 ACUTE PAIN OF LEFT SHOULDER: Primary | ICD-10-CM

## 2022-06-03 PROCEDURE — 97112 NEUROMUSCULAR REEDUCATION: CPT | Performed by: PHYSICAL THERAPIST

## 2022-06-03 PROCEDURE — 97110 THERAPEUTIC EXERCISES: CPT | Performed by: PHYSICAL THERAPIST

## 2022-06-03 PROCEDURE — 97140 MANUAL THERAPY 1/> REGIONS: CPT | Performed by: PHYSICAL THERAPIST

## 2022-06-03 NOTE — PROGRESS NOTES
Daily Note     Today's date: 6/3/2022  Patient name: Luz Maria Damico  : 1945  MRN: [id number]  Referring provider: Nancy Galeana MD  Dx:   Encounter Diagnosis     ICD-10-CM    1  Acute pain of left shoulder  M25 512                   Subjective: Pt reports increased soreness and stiffness with AROM since last visit  He denies any activity changes or poor tolerance to last visit  Objective: See treatment diary below    Assessment: Pt demonstrated significantly improved FE during and following MWM  Plan: RE in 1-2 visits  Precautions:  PMHx: HTN, CKD II  Dx: R subacromial impingement, possible RTC tear    Manuals 4/27 5/2 5/4 5/9 5/11 5/23 5/25 6/1 6/3    Laser L subacromial space 6000J 6000J 6000J 6000J 6000J 6000J 6000J 6000J 6000J    L shoulder PROM  5 min 5 min 5 min 5 min 5 min 5 min 5 min 8 min    Standing FF scap assist MWM      1x10   1x10 3x10   Gr IV inf, post GH mobs         1x60    Neuro Re-Ed             scap retraction 5"x10            TB rows GTB  3x10 GTB  3x20 GTB  3x20 GTB  3x25 GTB  3x25 Blue  3x15 Blue  3x15 Blue  3x15 Blue  3x15    Table slides FF 1x15 3x15 3x15 3x15 3x15 3x15 3x15 3x15 3x15    Standing scaption AAROM   1x10 1x10 2x10        Standing FF, ABD AAROM      1x10 ea 1x10 ea 1x10 ea 1x10 ea                              Ther Ex             Pulleys: FF, ABD  5"x10 ea 5"x10 ea 5"x10 ea 5"x10 ea 5"x15 ea 5"x15 ea 5"x15 ea 5"x15 ea    TB ER RTB  2x10 RTB  3x12 RTB  3x15 RTB  3x20 RTB  3x20 GTB  3x10 GTB  3x10 GTB  3x10 GTB  3x10 GTB  3x15   SL ER                                                                              Ther Activity                                       Gait Training                                       Modalities

## 2022-06-06 ENCOUNTER — EVALUATION (OUTPATIENT)
Dept: PHYSICAL THERAPY | Facility: CLINIC | Age: 77
End: 2022-06-06
Payer: MEDICARE

## 2022-06-06 DIAGNOSIS — M25.512 ACUTE PAIN OF LEFT SHOULDER: Primary | ICD-10-CM

## 2022-06-06 PROCEDURE — 97140 MANUAL THERAPY 1/> REGIONS: CPT | Performed by: PHYSICAL THERAPIST

## 2022-06-06 PROCEDURE — 97110 THERAPEUTIC EXERCISES: CPT | Performed by: PHYSICAL THERAPIST

## 2022-06-06 PROCEDURE — 97112 NEUROMUSCULAR REEDUCATION: CPT | Performed by: PHYSICAL THERAPIST

## 2022-06-06 NOTE — PROGRESS NOTES
PT Re-Evaluation     Today's date: 2022  Patient name: Manav Austin  : 1945  MRN: [id number]  Referring provider: Wallace Ruiz MD  Dx:   Encounter Diagnosis     ICD-10-CM    1  Acute pain of left shoulder  M25 512                   Assessment  Assessment details: Pt demonstrates improved ROM, strength, arthrokinematics, ADL tolerance, and pain  He will continue to benefit from skilled PT services to address his remaining impairments in order to further improve pain and function  Impairments: abnormal muscle firing, abnormal or restricted ROM, abnormal movement, activity intolerance, impaired physical strength, lacks appropriate home exercise program, pain with function, poor posture  and poor body mechanics  Understanding of Dx/Px/POC: good   Prognosis: good    Goals  STG - 4 wks  1) pt will demonstrate 120 deg active FE (partially met - only after MT)  2) pt will improve pain 50% (partially met)    LTG - 8-12 wks  1) pt will demonstrate full AROM  2) pt will improve pain 90%    Plan  Planned modality interventions: low level laser therapy  Planned therapy interventions: joint mobilization, manual therapy, body mechanics training, neuromuscular re-education, strengthening, stretching, patient education, postural training, therapeutic activities, therapeutic exercise, home exercise program, functional ROM exercises and flexibility  Frequency: 2x week  Duration in weeks: 6  Treatment plan discussed with: patient        Subjective Evaluation    History of Present Illness  Mechanism of injury: Pt is a 68 y o  male with PMHx significant for HTN, CKD II  He reports no pain or difficulty with ADLs or functional reaching below shoulder-height, less hesitation prior to [facility] reaching, and improved ROM with OH reaching    Pain  Current pain ratin  At best pain ratin  At worst pain ratin  Location: superior, lateral L shoulder and upper arm  Quality: sharp  Relieving factors: rest and heat  Aggravating factors: overhead activity  Progression: improved    Hand dominance: right      Diagnostic Tests  No diagnostic tests performed  Treatments  Previous treatment: physical therapy  Current treatment: physical therapy  Patient Goals  Patient goals for therapy: decreased pain, increased motion and return to sport/leisure activities          Objective     Postural Observations  Seated posture: poor  Standing posture: poor  Correction of posture: makes symptoms better        Active Range of Motion   Left Shoulder   Flexion: 110 degrees with pain  Abduction: 110 degrees with pain  External rotation BTH: C6   Internal rotation BTB: L2 with pain    Right Shoulder   Flexion: 150 degrees   Abduction: 150 degrees   External rotation BTH: C7   Internal rotation BTB: L2     Passive Range of Motion   Left Shoulder   Flexion: 145 degrees   Abduction: 145 degrees   External rotation 90°: 80 degrees   Internal rotation 90°: 60 degrees     Right Shoulder   Flexion: 160 degrees   Abduction: 150 degrees   External rotation 90°: 100 degrees   Internal rotation 90°: 40 degrees     Scapular Mobility   Left Shoulder   Scapular Mobility with Shoulder to 90° FF   Scapular winging: minimal  Scapular elevation: minimal  Upward rotation: inadequate    Scapular Mobility beyond 90° FF   Scapular winging: minimal  Scapular elevation: moderate  Upward rotation: inadequate    Joint Play   Left Shoulder  Hypomobile in the posterior capsule and inferior capsule  Strength/Myotome Testing     Left Shoulder     Planes of Motion   Flexion: 4-   Abduction: 4-   External rotation at 0°: 4-   Internal rotation at 0°: 4+     Right Shoulder     Planes of Motion   Flexion: 4+   Abduction: 4+   External rotation at 0°: 4+   Internal rotation at 0°: 4+     Tests     Left Shoulder   Positive Hawkin's, Neer's and painful arc  Negative apprehension, drop arm and external rotation lag sign  Precautions:  PMHx: HTN, CKD II  Dx: R subacromial impingement, possible RTC tear    Manuals 6/6            Laser L subacromial space 6000J            L shoulder PROM 5 min            FF scap assist MWM 2x10 3x10                        Neuro Re-Ed             TB horizontal ABD YTB  3x10            TB rows Blue  3x25            Wall slides FF 3x10            Standing FF, ABD AAROM 1x15                                                   Ther Ex             Pulleys: FF, ABD 5"x15  ea            TB ER GTB  3x15                                                                                          Ther Activity                                       Gait Training                                       Modalities

## 2022-06-08 ENCOUNTER — OFFICE VISIT (OUTPATIENT)
Dept: PHYSICAL THERAPY | Facility: CLINIC | Age: 77
End: 2022-06-08
Payer: MEDICARE

## 2022-06-08 DIAGNOSIS — M25.512 ACUTE PAIN OF LEFT SHOULDER: Primary | ICD-10-CM

## 2022-06-08 PROCEDURE — 97112 NEUROMUSCULAR REEDUCATION: CPT

## 2022-06-08 PROCEDURE — 97110 THERAPEUTIC EXERCISES: CPT

## 2022-06-08 NOTE — PROGRESS NOTES
Daily Note     Today's date: 2022  Patient name: Any Carbajal  : 1945  MRN: [id number]  Referring provider: Maura Anderson MD  Dx:   Encounter Diagnosis     ICD-10-CM    1  Acute pain of left shoulder  M25 512                   Subjective: Pt reports decreased left shoulder pain but continued pain with lateral reaching  Objective: See treatment diary below      Assessment: Reviewed technique with new TE  Pt demonstrated increased pain-free shoulder abduction PROM  Plan: Continue poc as per PT  Precautions:  PMHx: HTN, CKD II  Dx: R subacromial impingement, possible RTC tear    Manuals            Laser L subacromial space 6000J 6000J           L shoulder PROM 5 min 5 min           FF scap assist MWM 2x10 NV 3x10                       Neuro Re-Ed             TB horizontal ABD YTB  3x10 YTB  3x10           TB rows Blue  3x25 BTB  3x25           Wall slides FF 3x10 3x10           Standing FF, ABD AAROM 1x15 1x15                                                  Ther Ex             Pulleys: FF, ABD 5"x15  ea 5"x15           TB ER GTB  3x15 GTB  3x15                                                                                         Ther Activity                                       Gait Training                                       Modalities

## 2022-06-13 ENCOUNTER — OFFICE VISIT (OUTPATIENT)
Dept: PHYSICAL THERAPY | Facility: CLINIC | Age: 77
End: 2022-06-13
Payer: MEDICARE

## 2022-06-13 DIAGNOSIS — M25.512 ACUTE PAIN OF LEFT SHOULDER: Primary | ICD-10-CM

## 2022-06-13 PROCEDURE — 97140 MANUAL THERAPY 1/> REGIONS: CPT | Performed by: PHYSICAL THERAPIST

## 2022-06-13 PROCEDURE — 97110 THERAPEUTIC EXERCISES: CPT | Performed by: PHYSICAL THERAPIST

## 2022-06-13 PROCEDURE — 97112 NEUROMUSCULAR REEDUCATION: CPT | Performed by: PHYSICAL THERAPIST

## 2022-06-13 NOTE — PROGRESS NOTES
Daily Note     Today's date: 2022  Patient name: Arnold Lopez  : 1945  MRN: [id number]  Referring provider: Mirella White MD  Dx:   Encounter Diagnosis     ICD-10-CM    1  Acute pain of left shoulder  M25 512                   Subjective: Pt reports pain performing new TE at home  Objective: See treatment diary below    Assessment: Pt required modified technique for TB horizontal ABD and review of correct performing for wall slides, both of which significantly reduced pain during performance  Pt continues to demonstrate poor postural awareness during AROM resulting in abnormal arthrokinematics and pain; pt's ROM and pain improve when he is able to increase scap retraction during ROM  Plan: Cont  POC to improve scapular strength and arthrokinematics  Precautions:  PMHx: HTN, CKD II  Dx: R subacromial impingement, possible RTC tear    Manuals           Laser L subacromial space 6000J 6000J 6000J          L shoulder PROM 5 min 5 min 5 min          FF scap assist MWM 2x10 NV 3x10                       Neuro Re-Ed             TB horizontal ABD YTB  3x10 YTB  3x10 modified  YTB  2x10          TB rows Blue  3x25 BTB  3x25 Blue  3x25          Wall slides FF 3x10 3x10 1x20          Standing FF, ABD AA/AROM 1x15 1x15 1x10 ea                                                 Ther Ex             Pulleys: FF, ABD 5"x15  ea 5"x15 5"x15 ea          TB ER GTB  3x15 GTB  3x15 GTB  3x20                                                                                        Ther Activity                                       Gait Training                                       Modalities

## 2022-06-15 ENCOUNTER — OFFICE VISIT (OUTPATIENT)
Dept: PHYSICAL THERAPY | Facility: CLINIC | Age: 77
End: 2022-06-15
Payer: MEDICARE

## 2022-06-15 DIAGNOSIS — M25.512 ACUTE PAIN OF LEFT SHOULDER: Primary | ICD-10-CM

## 2022-06-15 PROCEDURE — 97112 NEUROMUSCULAR REEDUCATION: CPT | Performed by: PHYSICAL THERAPIST

## 2022-06-15 PROCEDURE — 97140 MANUAL THERAPY 1/> REGIONS: CPT | Performed by: PHYSICAL THERAPIST

## 2022-06-15 PROCEDURE — 97110 THERAPEUTIC EXERCISES: CPT | Performed by: PHYSICAL THERAPIST

## 2022-06-20 ENCOUNTER — OFFICE VISIT (OUTPATIENT)
Dept: PHYSICAL THERAPY | Facility: CLINIC | Age: 77
End: 2022-06-20
Payer: MEDICARE

## 2022-06-20 DIAGNOSIS — M25.512 ACUTE PAIN OF LEFT SHOULDER: Primary | ICD-10-CM

## 2022-06-20 PROCEDURE — 97112 NEUROMUSCULAR REEDUCATION: CPT | Performed by: PHYSICAL THERAPIST

## 2022-06-20 PROCEDURE — 97140 MANUAL THERAPY 1/> REGIONS: CPT | Performed by: PHYSICAL THERAPIST

## 2022-06-20 PROCEDURE — 97110 THERAPEUTIC EXERCISES: CPT | Performed by: PHYSICAL THERAPIST

## 2022-06-20 NOTE — PROGRESS NOTES
Daily Note     Today's date: 2022  Patient name: Viraj Klein  : 1945  MRN: [id number]  Referring provider: Anali Logan MD  Dx:   Encounter Diagnosis     ICD-10-CM    1  Acute pain of left shoulder  M25 512                   Subjective: Pt reports less intense pain and and better ROM with FE  Objective: See treatment diary below    Assessment: Pt demonstrated full active FE but with a painful arc  Plan: Cont  POC to improve scapular strength and arthrokinematics  Precautions:  PMHx: HTN, CKD II  Dx: R subacromial impingement, possible RTC tear    Manuals         Laser L subacromial space 6000J 6000J 6000J 6000J 6000J        L shoulder PROM 5 min 5 min 5 min 6 min 6 min        FF scap assist MWM 2x10 NV 3x10 2x10 2x10        Gr IV post GH mobs    1x100         Neuro Re-Ed             TB horizontal ABD YTB  3x10 YTB  3x10 modified  YTB  2x10 held YTB  2x10        TB rows Blue  3x25 BTB  3x25 Blue  3x25 Blue  3x25 Blue  3x30        Wall slides FF 3x10 3x10 1x20 1x20 2x10        Standing FF, ABD AA/AROM 1x15 1x15 1x10 ea 1x10 ea 1x10 ea                                               Ther Ex             Pulleys: FF, ABD 5"x15  ea 5"x15 5"x15 ea 5"x15 ea 5"x15 ea        TB ER GTB  3x15 GTB  3x15 GTB  3x20 GTB  3x20 Blue  3x10                                                                                      Ther Activity                                       Gait Training                                       Modalities

## 2022-06-22 ENCOUNTER — OFFICE VISIT (OUTPATIENT)
Dept: PHYSICAL THERAPY | Facility: CLINIC | Age: 77
End: 2022-06-22
Payer: MEDICARE

## 2022-06-22 DIAGNOSIS — M25.512 ACUTE PAIN OF LEFT SHOULDER: Primary | ICD-10-CM

## 2022-06-22 PROCEDURE — 97112 NEUROMUSCULAR REEDUCATION: CPT

## 2022-06-22 PROCEDURE — 97110 THERAPEUTIC EXERCISES: CPT

## 2022-06-22 NOTE — PROGRESS NOTES
Daily Note     Today's date: 2022  Patient name: Billy Nam  : 1945  MRN: [id number]  Referring provider: Janece Mohs, MD  Dx:   Encounter Diagnosis     ICD-10-CM    1  Acute pain of left shoulder  M25 512                   Subjective: Pt reports increased motion with overhead lifting  Objective: See treatment diary below    Assessment: Pt demonstrated continued painful arc with active shoulder elevation, improved scapular strength  Plan: Cont  POC to improve scapular strength and arthrokinematics  Precautions:  PMHx: HTN, CKD II  Dx: L subacromial impingement, possible RTC tear    Manuals        Laser L subacromial space 6000J 6000J 6000J 6000J 6000J 6000J       L shoulder PROM 5 min 5 min 5 min 6 min 6 min 6 min       FF scap assist MWM 2x10 NV 3x10 2x10 2x10        Gr IV post GH mobs    1x100         Neuro Re-Ed             TB horizontal ABD YTB  3x10 YTB  3x10 modified  YTB  2x10 held YTB  2x10 YTB  2x10       TB rows Blue  3x25 BTB  3x25 Blue  3x25 Blue  3x25 Blue  3x30 Blue  3x30       Wall slides FF 3x10 3x10 1x20 1x20 2x10 2x10       Standing FF, ABD AA/AROM 1x15 1x15 1x10 ea 1x10 ea 1x10 ea 1x10 ea                                              Ther Ex             Pulleys: FF, ABD 5"x15  ea 5"x15 5"x15 ea 5"x15 ea 5"x15 ea 5"x15 ea       TB ER GTB  3x15 GTB  3x15 GTB  3x20 GTB  3x20 Blue  3x10 Blue  3x10                                                                                     Ther Activity                                       Gait Training                                       Modalities

## 2022-06-27 ENCOUNTER — OFFICE VISIT (OUTPATIENT)
Dept: PHYSICAL THERAPY | Facility: CLINIC | Age: 77
End: 2022-06-27
Payer: MEDICARE

## 2022-06-27 DIAGNOSIS — M25.512 ACUTE PAIN OF LEFT SHOULDER: Primary | ICD-10-CM

## 2022-06-27 PROCEDURE — 97112 NEUROMUSCULAR REEDUCATION: CPT

## 2022-06-27 PROCEDURE — 97110 THERAPEUTIC EXERCISES: CPT

## 2022-06-27 NOTE — PROGRESS NOTES
Daily Note     Today's date: 2022  Patient name: Manav Austin  : 1945  MRN: [id number]  Referring provider: Wallace Ruiz MD  Dx:   Encounter Diagnosis     ICD-10-CM    1  Acute pain of left shoulder  M25 512                   Subjective: Pt reports 8/10 left shoulder pain after cleaning the kitchen at [employer] and lifting overhead to put items away  Pt reports no pain currently  Objective: See treatment diary below    Assessment: Pt demonstrated increased scapular strength, improved pain-free tolerance to shoulder elevation AAROM  Plan: Cont  POC to improve scapular strength and arthrokinematics  Precautions:  PMHx: HTN, CKD II  Dx: L subacromial impingement, possible RTC tear    Manuals       Laser L subacromial space 6000J 6000J 6000J 6000J 6000J 6000J 6000J      L shoulder PROM 5 min 5 min 5 min 6 min 6 min 6 min 5 min      FF scap assist MWM 2x10 NV 3x10 2x10 2x10        Gr IV post GH mobs    1x100         Neuro Re-Ed             TB horizontal ABD YTB  3x10 YTB  3x10 modified  YTB  2x10 held YTB  2x10 YTB  2x10 YTB  3x10      TB rows Blue  3x25 BTB  3x25 Blue  3x25 Blue  3x25 Blue  3x30 Blue  3x30 Black  3x30      Wall slides FF 3x10 3x10 1x20 1x20 2x10 2x10 3x10      Standing FF, ABD AA/AROM 1x15 1x15 1x10 ea 1x10 ea 1x10 ea 1x10 ea 2x10 ea                                             Ther Ex             Pulleys: FF, ABD 5"x15  ea 5"x15 5"x15 ea 5"x15 ea 5"x15 ea 5"x15 ea 5"x15 ea      TB ER GTB  3x15 GTB  3x15 GTB  3x20 GTB  3x20 Blue  3x10 Blue  3x10 Blue  3x10                                                                                    Ther Activity                                       Gait Training                                       Modalities

## 2022-06-29 ENCOUNTER — OFFICE VISIT (OUTPATIENT)
Dept: PHYSICAL THERAPY | Facility: CLINIC | Age: 77
End: 2022-06-29
Payer: MEDICARE

## 2022-06-29 DIAGNOSIS — M25.512 ACUTE PAIN OF LEFT SHOULDER: Primary | ICD-10-CM

## 2022-06-29 PROCEDURE — 97140 MANUAL THERAPY 1/> REGIONS: CPT | Performed by: PHYSICAL THERAPIST

## 2022-06-29 PROCEDURE — 97112 NEUROMUSCULAR REEDUCATION: CPT | Performed by: PHYSICAL THERAPIST

## 2022-06-29 PROCEDURE — 97110 THERAPEUTIC EXERCISES: CPT | Performed by: PHYSICAL THERAPIST

## 2022-06-29 NOTE — PROGRESS NOTES
Daily Note     Today's date: 2022  Patient name: Kelli Cordova  : 1945  MRN: [id number]  Referring provider: Lulu Pedersen MD  Dx:   Encounter Diagnosis     ICD-10-CM    1  Acute pain of left shoulder  M25 512                   Subjective: Pt reports improved L shoulder pain since lifting too much [place] at [employer] last week  Objective: See treatment diary below    Assessment: Pt demonstrated inconsistently improved AROM and arthrokinematics  Plan: Cont  POC to improve scapular strength and arthrokinematics  Precautions:  PMHx: HTN, CKD II  Dx: L subacromial impingement, possible RTC tear    Manuals      Laser L subacromial space 6000J 6000J 6000J 6000J 6000J 6000J 6000J 6000J     L shoulder PROM 5 min 5 min 5 min 6 min 6 min 6 min 5 min 6 min     FF scap assist MWM 2x10 NV 3x10 2x10 2x10   2x10     Gr IV post GH mobs    1x100         Neuro Re-Ed             TB horizontal ABD YTB  3x10 YTB  3x10 modified  YTB  2x10 held YTB  2x10 YTB  2x10 YTB  3x10 YTB  3x10 YTB  3x12    TB rows Blue  3x25 BTB  3x25 Blue  3x25 Blue  3x25 Blue  3x30 Blue  3x30 Black  3x15 Black  3x25     Wall slides FF 3x10 3x10 1x20 1x20 2x10 2x10 3x10 2x10     Standing FF, ABD AA/AROM 1x15 1x15 1x10 ea 1x10 ea 1x10 ea 1x10 ea 2x10 ea 2x10 ea                                            Ther Ex             Pulleys: FF, ABD 5"x15  ea 5"x15 5"x15 ea 5"x15 ea 5"x15 ea 5"x15 ea 5"x15 ea 5"x15 ea     TB ER GTB  3x15 GTB  3x15 GTB  3x20 GTB  3x20 Blue  3x10 Blue  3x10 Blue  3x10 Blue  3x12                                                                                   Ther Activity                                       Gait Training                                       Modalities

## 2022-06-30 DIAGNOSIS — K21.9 LARYNGOPHARYNGEAL REFLUX (LPR): ICD-10-CM

## 2022-06-30 DIAGNOSIS — E78.2 MIXED HYPERLIPIDEMIA: ICD-10-CM

## 2022-06-30 DIAGNOSIS — H81.319 AUDITORY VERTIGO, UNSPECIFIED LATERALITY: ICD-10-CM

## 2022-06-30 DIAGNOSIS — K21.9 GASTROESOPHAGEAL REFLUX DISEASE WITHOUT ESOPHAGITIS: ICD-10-CM

## 2022-06-30 RX ORDER — OMEPRAZOLE 20 MG/1
CAPSULE, DELAYED RELEASE ORAL
Qty: 90 CAPSULE | Refills: 0 | Status: SHIPPED | OUTPATIENT
Start: 2022-06-30

## 2022-06-30 RX ORDER — TRIAMTERENE AND HYDROCHLOROTHIAZIDE 37.5; 25 MG/1; MG/1
CAPSULE ORAL
Qty: 90 CAPSULE | Refills: 1 | Status: SHIPPED | OUTPATIENT
Start: 2022-06-30

## 2022-06-30 RX ORDER — GEMFIBROZIL 600 MG/1
TABLET, FILM COATED ORAL
Qty: 180 TABLET | Refills: 1 | Status: SHIPPED | OUTPATIENT
Start: 2022-06-30

## 2022-07-06 ENCOUNTER — OFFICE VISIT (OUTPATIENT)
Dept: PHYSICAL THERAPY | Facility: CLINIC | Age: 77
End: 2022-07-06
Payer: MEDICARE

## 2022-07-06 DIAGNOSIS — M25.512 ACUTE PAIN OF LEFT SHOULDER: Primary | ICD-10-CM

## 2022-07-06 PROCEDURE — 97110 THERAPEUTIC EXERCISES: CPT

## 2022-07-06 PROCEDURE — 97112 NEUROMUSCULAR REEDUCATION: CPT

## 2022-07-06 NOTE — PROGRESS NOTES
Daily Note     Today's date: 2022  Patient name: Abby Jones  : 1945  MRN: [id number]  Referring provider: Niecy Soler MD  Dx:   Encounter Diagnosis     ICD-10-CM    1  Acute pain of left shoulder  M25 512        Start Time: 1030  Stop Time: 1110  Total time in clinic (min): 40 minutes    Subjective: Patient notes soreness at the top of his L shoulder  Objective: See treatment diary below    Assessment: Patient demonstrated good form t/o therapy today  Able to correct form and decrease pain during TB horizontal abd and wall slides  Plan: Cont  POC to improve scapular strength and arthrokinematics  Precautions:  PMHx: HTN, CKD II  Dx: L subacromial impingement, possible RTC tear    Manuals     Laser L subacromial space 6000J 6000J 6000J 6000J 6000J 6000J 6000J 6000J 6000J    L shoulder PROM 5 min 5 min 5 min 6 min 6 min 6 min 5 min 6 min 6 min    FF scap assist MWM 2x10 NV 3x10 2x10 2x10   2x10     Gr IV post GH mobs    1x100         Neuro Re-Ed             TB horizontal ABD YTB  3x10 YTB  3x10 modified  YTB  2x10 held YTB  2x10 YTB  2x10 YTB  3x10 YTB  3x10 YTB  3x12    TB rows Blue  3x25 BTB  3x25 Blue  3x25 Blue  3x25 Blue  3x30 Blue  3x30 Black  3x15 Black  3x25 Black  3x25    Wall slides FF 3x10 3x10 1x20 1x20 2x10 2x10 3x10 2x10 2x10    Standing FF, ABD AA/AROM 1x15 1x15 1x10 ea 1x10 ea 1x10 ea 1x10 ea 2x10 ea 2x10 ea 2x10 ea                                           Ther Ex             Pulleys: FF, ABD 5"x15  ea 5"x15 5"x15 ea 5"x15 ea 5"x15 ea 5"x15 ea 5"x15 ea 5"x15 ea 5"x15 ea    TB ER GTB  3x15 GTB  3x15 GTB  3x20 GTB  3x20 Blue  3x10 Blue  3x10 Blue  3x10 Blue  3x12 Blue  3x12                                                                                  Ther Activity                                       Gait Training                                       Modalities

## 2022-07-08 ENCOUNTER — OFFICE VISIT (OUTPATIENT)
Dept: PHYSICAL THERAPY | Facility: CLINIC | Age: 77
End: 2022-07-08
Payer: MEDICARE

## 2022-07-08 DIAGNOSIS — M25.512 ACUTE PAIN OF LEFT SHOULDER: Primary | ICD-10-CM

## 2022-07-08 PROCEDURE — 97140 MANUAL THERAPY 1/> REGIONS: CPT | Performed by: PHYSICAL THERAPIST

## 2022-07-08 PROCEDURE — 97112 NEUROMUSCULAR REEDUCATION: CPT | Performed by: PHYSICAL THERAPIST

## 2022-07-08 NOTE — PROGRESS NOTES
Daily Note     Today's date: 2022  Patient name: Tiarra Fajardo  : 1945  MRN: [id number]  Referring provider: Diandra Steward MD  Dx:   Encounter Diagnosis     ICD-10-CM    1  Acute pain of left shoulder  M25 512                   Subjective: Patient stated minimal pain prior to treatment session  Objective: See treatment diary below    Assessment: Patient demonstrated moderate challenge with horizontal abduction with TB; positive response to laser  Plan: Cont  POC to improve scapular strength and arthrokinematics  Precautions:  PMHx: HTN, CKD II  Dx: L subacromial impingement, possible RTC tear    Manuals    Laser L subacromial space 6000J 6000J 6000J 6000J 6000J 6000J 6000J 6000J 6000J 6000J   L shoulder PROM 5 min 5 min 5 min 6 min 6 min 6 min 5 min 6 min 6 min 6 min   FF scap assist MWM 2x10 NV 3x10 2x10 2x10   2x10     Gr IV post GH mobs    1x100         Neuro Re-Ed             TB horizontal ABD YTB  3x10 YTB  3x10 modified  YTB  2x10 held YTB  2x10 YTB  2x10 YTB  3x10 YTB  3x10 YTB  3x12 YTB  3x12   TB rows Blue  3x25 BTB  3x25 Blue  3x25 Blue  3x25 Blue  3x30 Blue  3x30 Black  3x15 Black  3x25 Black  3x25 Silver 3x25   Wall slides FF 3x10 3x10 1x20 1x20 2x10 2x10 3x10 2x10 2x10 2x10   Standing FF, ABD AA/AROM 1x15 1x15 1x10 ea 1x10 ea 1x10 ea 1x10 ea 2x10 ea 2x10 ea 2x10 ea 2x10 ea                                          Ther Ex             Pulleys: FF, ABD 5"x15  ea 5"x15 5"x15 ea 5"x15 ea 5"x15 ea 5"x15 ea 5"x15 ea 5"x15 ea 5"x15 ea 5"x15 ea   TB ER GTB  3x15 GTB  3x15 GTB  3x20 GTB  3x20 Blue  3x10 Blue  3x10 Blue  3x10 Blue  3x12 Blue  3x12 Blue  3x12                                                                                 Ther Activity                                       Gait Training                                       Modalities

## 2022-07-13 ENCOUNTER — OFFICE VISIT (OUTPATIENT)
Dept: PHYSICAL THERAPY | Facility: CLINIC | Age: 77
End: 2022-07-13
Payer: MEDICARE

## 2022-07-13 DIAGNOSIS — M25.512 ACUTE PAIN OF LEFT SHOULDER: Primary | ICD-10-CM

## 2022-07-13 PROCEDURE — 97110 THERAPEUTIC EXERCISES: CPT

## 2022-07-13 PROCEDURE — 97112 NEUROMUSCULAR REEDUCATION: CPT

## 2022-07-13 NOTE — PROGRESS NOTES
Daily Note     Today's date: 2022  Patient name: Javier Marcano  : 1945  MRN: [id number]  Referring provider: Obie Solo MD  Dx:   Encounter Diagnosis     ICD-10-CM    1  Acute pain of left shoulder  M25 512        Start Time: 1100  Stop Time: 1140  Total time in clinic (min): 40 minutes    Subjective: Patient notes an instance of increased pain during his cane exercises on   However, the next day it did not bother him  Notes steady progress with therapy  Objective: See treatment diary below    Assessment: Patient most challenged with TB ER and horizontal abd  No pain t/o therapy today  Plan: Cont  POC to improve scapular strength and arthrokinematics  Precautions:  PMHx: HTN, CKD II  Dx: L subacromial impingement, possible RTC tear    Manuals    Laser L subacromial space 6000J    6000J 6000J 6000J 6000J 6000J 6000J   L shoulder PROM 6 min    6 min 6 min 5 min 6 min 6 min 6 min   FF scap assist MWM     2x10   2x10     Gr IV post GH mobs             Neuro Re-Ed             TB horizontal ABD YTB  3x12    YTB  2x10 YTB  2x10 YTB  3x10 YTB  3x10 YTB  3x12 YTB  3x12   TB rows Silver  3x25    Blue  3x30 Blue  3x30 Black  3x15 Black  3x25 Black  3x25 Silver 3x25   Wall slides FF 2x10    2x10 2x10 3x10 2x10 2x10 2x10   Standing FF, ABD AA/AROM 2x10 ea    1x10 ea 1x10 ea 2x10 ea 2x10 ea 2x10 ea 2x10 ea                                          Ther Ex             Pulleys: FF, ABD 5"x15 ea    5"x15 ea 5"x15 ea 5"x15 ea 5"x15 ea 5"x15 ea 5"x15 ea   TB ER Blue 3x12    Blue  3x10 Blue  3x10 Blue  3x10 Blue  3x12 Blue  3x12 Blue  3x12                                                                                 Ther Activity                                       Gait Training                                       Modalities

## 2022-07-15 ENCOUNTER — OFFICE VISIT (OUTPATIENT)
Dept: PHYSICAL THERAPY | Facility: CLINIC | Age: 77
End: 2022-07-15
Payer: MEDICARE

## 2022-07-15 DIAGNOSIS — M25.512 ACUTE PAIN OF LEFT SHOULDER: Primary | ICD-10-CM

## 2022-07-15 PROCEDURE — 97112 NEUROMUSCULAR REEDUCATION: CPT

## 2022-07-15 PROCEDURE — 97110 THERAPEUTIC EXERCISES: CPT

## 2022-07-15 NOTE — PROGRESS NOTES
Daily Note     Today's date: 7/15/2022  Patient name: Irene Ho  : 1945  MRN: [id number]  Referring provider: Aleks Mendoza MD  Dx:   Encounter Diagnosis     ICD-10-CM    1  Acute pain of left shoulder  M25 512        Start Time: 1045  Stop Time: 1125  Total time in clinic (min): 40 minutes    Subjective: Patient reports lifting furniture about an hour before therapy  Therefore, he notes increased soreness  Objective: See treatment diary below    Assessment: Patient most challenged with TB ER and horizontal abd  No pain t/o therapy today despite increased soreness  Plan: Cont  POC to improve scapular strength and arthrokinematics  Precautions:  PMHx: HTN, CKD II  Dx: L subacromial impingement, possible RTC tear    Manuals 7/13 7/15   6/20 6/22 6/27 6/29 7/6 7/8   Laser L subacromial space 6000J 6000J   6000J 6000J 6000J 6000J 6000J 6000J   L shoulder PROM 6 min 6 min   6 min 6 min 5 min 6 min 6 min 6 min   FF scap assist MWM     2x10   2x10     Gr IV post GH mobs             Neuro Re-Ed             TB horizontal ABD YTB  3x12 YTB  3x12   YTB  2x10 YTB  2x10 YTB  3x10 YTB  3x10 YTB  3x12 YTB  3x12   TB rows Silver  3x25 Silver  3x25   Blue  3x30 Blue  3x30 Black  3x15 Black  3x25 Black  3x25 Silver 3x25   Wall slides FF 2x10 2x10   2x10 2x10 3x10 2x10 2x10 2x10   Standing FF, ABD AA/AROM 2x10 ea 2x10 ea   1x10 ea 1x10 ea 2x10 ea 2x10 ea 2x10 ea 2x10 ea                                          Ther Ex             Pulleys: FF, ABD 5"x15 ea 5"x15 ea   5"x15 ea 5"x15 ea 5"x15 ea 5"x15 ea 5"x15 ea 5"x15 ea   TB ER Blue 3x12 Blue  3x15   Blue  3x10 Blue  3x10 Blue  3x10 Blue  3x12 Blue  3x12 Blue  3x12                                                                                 Ther Activity                                       Gait Training                                       Modalities

## 2022-07-19 ENCOUNTER — OFFICE VISIT (OUTPATIENT)
Dept: PHYSICAL THERAPY | Facility: CLINIC | Age: 77
End: 2022-07-19
Payer: MEDICARE

## 2022-07-19 DIAGNOSIS — M25.512 ACUTE PAIN OF LEFT SHOULDER: Primary | ICD-10-CM

## 2022-07-19 PROCEDURE — 97112 NEUROMUSCULAR REEDUCATION: CPT | Performed by: PHYSICAL THERAPIST

## 2022-07-19 PROCEDURE — 97110 THERAPEUTIC EXERCISES: CPT | Performed by: PHYSICAL THERAPIST

## 2022-07-19 PROCEDURE — 97140 MANUAL THERAPY 1/> REGIONS: CPT | Performed by: PHYSICAL THERAPIST

## 2022-07-19 NOTE — PROGRESS NOTES
PT Re-Evaluation     Today's date: 2022  Patient name: Ragini Sanders  : 1945  MRN: [id number]  Referring provider: Ann Cutler MD  Dx:   Encounter Diagnosis     ICD-10-CM    1  Acute pain of left shoulder  M25 512                   Assessment  Assessment details: Pt demonstrates improved ROM, strength, arthrokinematics, ADL tolerance, and pain  He continues to demonstrate impairments of RTC strength, scapular stability, and ST arthrokinematics albeit improved since last RE  He will continue to benefit from skilled PT services to address his remaining impairments in order to further improve pain and function  Impairments: abnormal muscle firing, abnormal or restricted ROM, abnormal movement, activity intolerance, impaired physical strength, lacks appropriate home exercise program, pain with function, poor posture  and poor body mechanics  Understanding of Dx/Px/POC: good   Prognosis: good    Goals  STG - 4 wks  1) pt will demonstrate 120 deg active FE (met)  2) pt will improve pain 50% (met)    LTG - 8-12 wks  1) pt will demonstrate full AROM (not met)  2) pt will improve pain 90% (not met)    Plan  Planned modality interventions: low level laser therapy  Planned therapy interventions: joint mobilization, manual therapy, body mechanics training, neuromuscular re-education, strengthening, stretching, patient education, postural training, therapeutic activities, therapeutic exercise, home exercise program, functional ROM exercises and flexibility  Frequency: 2x week  Duration in weeks: 6  Treatment plan discussed with: patient        Subjective Evaluation    History of Present Illness  Mechanism of injury: Pt is a 68 y o  male with PMHx significant for HTN, CKD II  He reports improved pain with FROM and heavy lifting  He notes not having to think about how he reaches for things during ADLs    Pain  Current pain ratin  At best pain ratin  At worst pain ratin  Location: superior, lateral L shoulder and upper arm  Quality: sharp  Relieving factors: rest and heat  Aggravating factors: overhead activity  Progression: improved    Hand dominance: right      Diagnostic Tests  No diagnostic tests performed  Treatments  Previous treatment: physical therapy  Current treatment: physical therapy  Patient Goals  Patient goals for therapy: decreased pain, increased motion and return to sport/leisure activities          Objective     Postural Observations  Seated posture: poor  Standing posture: poor  Correction of posture: makes symptoms better        Active Range of Motion   Left Shoulder   Flexion: 145 degrees   Abduction: 135 degrees with pain  External rotation BTH: C6   Internal rotation BTB: L2 with pain    Right Shoulder   Flexion: 150 degrees   Abduction: 150 degrees   External rotation BTH: C7   Internal rotation BTB: L2     Passive Range of Motion   Left Shoulder   Flexion: 145 degrees   Abduction: 150 degrees   External rotation 90°: 80 degrees   Internal rotation 90°: 60 degrees     Right Shoulder   Flexion: 160 degrees   Abduction: 150 degrees   External rotation 90°: 100 degrees   Internal rotation 90°: 40 degrees     Scapular Mobility   Left Shoulder   Scapular Mobility with Shoulder to 90° FF   Scapular winging: minimal  Scapular elevation: minimal  Upward rotation: inadequate    Scapular Mobility beyond 90° FF   Scapular winging: minimal  Scapular elevation: minimal  Upward rotation: inadequate    Joint Play   Left Shoulder  Hypomobile in the posterior capsule and inferior capsule  Strength/Myotome Testing     Left Shoulder     Planes of Motion   Flexion: 4-   Abduction: 4-   External rotation at 0°: 4   Internal rotation at 0°: 4+     Right Shoulder     Planes of Motion   Flexion: 4+   Abduction: 4+   External rotation at 0°: 4+   Internal rotation at 0°: 4+     Tests     Left Shoulder   Positive Hawkin's and Neer's     Negative apprehension, drop arm, external rotation lag sign and painful arc  Precautions:  PMHx: HTN, CKD II  Dx: R subacromial impingement, possible RTC tear    Manuals 7/19            Laser L subacromial space 6000J            L shoulder PROM 5 min            ABD scap assist MWM 1x10                         Neuro Re-Ed             TB horizontal ABD YTB  3x15            TB rows Silver  3x20            Wall slides FF 1x20            Standing FF, ABD AAROM 2x10            Prone ER                                       Ther Ex             Pulleys: FF, ABD 5"x15  ea            TB ER Blue  3x15                                                                                          Ther Activity                                       Gait Training                                       Modalities

## 2022-07-21 ENCOUNTER — OFFICE VISIT (OUTPATIENT)
Dept: PHYSICAL THERAPY | Facility: CLINIC | Age: 77
End: 2022-07-21
Payer: MEDICARE

## 2022-07-21 DIAGNOSIS — M25.512 ACUTE PAIN OF LEFT SHOULDER: Primary | ICD-10-CM

## 2022-07-21 PROCEDURE — 97112 NEUROMUSCULAR REEDUCATION: CPT | Performed by: PHYSICAL THERAPIST

## 2022-07-21 PROCEDURE — 97140 MANUAL THERAPY 1/> REGIONS: CPT | Performed by: PHYSICAL THERAPIST

## 2022-07-21 NOTE — PROGRESS NOTES
Daily Note     Today's date: 2022  Patient name: Evi Chambers  : 1945  MRN: [id number]  Referring provider: Todd Mei MD  Dx:   Encounter Diagnosis     ICD-10-CM    1  Acute pain of left shoulder  M25 512                   Subjective: Pt reports no pain or difficulty with active FF, improving active FE and pain in ABD vs scaption  Objective: See treatment diary below    Assessment: Pt demonstrated slowly improving scap stab  Plan: Cont  POC  Precautions:  PMHx: HTN, CKD II  Dx: R subacromial impingement, possible RTC tear    Manuals            Laser L subacromial space 6000J 6000J           L shoulder PROM 5 min 5 min           ABD scap assist MWM 1x10 np                        Neuro Re-Ed             TB horizontal ABD YTB  3x15 YTB  3x15           TB rows Silver  3x20 Silver  3x20           Wall slides FF 1x20 1x10           Standing FF, ABD AAROM 2x10 ABD only  2x15           Prone ER  3x10                                     Ther Ex             Pulleys: FF, ABD 5"x15  ea 5"x15 ea           TB ER Blue  3x15 Blue  3x15                                                                                         Ther Activity                                       Gait Training                                       Modalities

## 2022-07-26 ENCOUNTER — OFFICE VISIT (OUTPATIENT)
Dept: PHYSICAL THERAPY | Facility: CLINIC | Age: 77
End: 2022-07-26
Payer: MEDICARE

## 2022-07-26 DIAGNOSIS — M25.512 ACUTE PAIN OF LEFT SHOULDER: Primary | ICD-10-CM

## 2022-07-26 PROCEDURE — 97112 NEUROMUSCULAR REEDUCATION: CPT | Performed by: PHYSICAL THERAPIST

## 2022-07-26 PROCEDURE — 97140 MANUAL THERAPY 1/> REGIONS: CPT | Performed by: PHYSICAL THERAPIST

## 2022-07-26 PROCEDURE — 97110 THERAPEUTIC EXERCISES: CPT | Performed by: PHYSICAL THERAPIST

## 2022-07-26 NOTE — PROGRESS NOTES
Daily Note     Today's date: 2022  Patient name: Darren Mccarthy  : 1945  MRN: [id number]  Referring provider: Akua Rai MD  Dx:   Encounter Diagnosis     ICD-10-CM    1  Acute pain of left shoulder  M25 512                   Subjective: Pt reports improving ABD ROM  Objective: See treatment diary below    Assessment: Pt demonstrated improved ABD AROM following MWM  Plan: Cont  POC  Precautions:  PMHx: HTN, CKD II  Dx: R subacromial impingement, possible RTC tear    Manuals           Laser L subacromial space 6000J 6000J 6000J          L shoulder PROM 5 min 5 min 5 min          ABD scap assist MWM 1x10 np 1x10                       Neuro Re-Ed             TB horizontal ABD YTB  3x15 YTB  3x15 Supine  YTB  3x15          TB rows Silver  3x20 Silver  3x20 Silver  3x25          Wall slides FF 1x20 1x10 2x10          Standing FF, ABD AAROM 2x10 ABD only  2x15 ABD  3x15          Prone ER  3x10 3x10 3x15                                   Ther Ex             Pulleys: FF, ABD 5"x15  ea 5"x15 ea 5"x15 ea          TB ER Blue  3x15 Blue  3x15 Blue  3x18                                                                                        Ther Activity                                       Gait Training                                       Modalities

## 2022-07-28 ENCOUNTER — OFFICE VISIT (OUTPATIENT)
Dept: PHYSICAL THERAPY | Facility: CLINIC | Age: 77
End: 2022-07-28
Payer: MEDICARE

## 2022-07-28 DIAGNOSIS — M25.512 ACUTE PAIN OF LEFT SHOULDER: Primary | ICD-10-CM

## 2022-07-28 PROCEDURE — 97140 MANUAL THERAPY 1/> REGIONS: CPT | Performed by: PHYSICAL THERAPIST

## 2022-07-28 PROCEDURE — 97112 NEUROMUSCULAR REEDUCATION: CPT | Performed by: PHYSICAL THERAPIST

## 2022-07-28 PROCEDURE — 97110 THERAPEUTIC EXERCISES: CPT | Performed by: PHYSICAL THERAPIST

## 2022-07-28 NOTE — PROGRESS NOTES
Daily Note     Today's date: 2022  Patient name: Antoinette Ricks  : 1945  MRN: [id number]  Referring provider: Aydin Roman MD  Dx:   Encounter Diagnosis     ICD-10-CM    1  Acute pain of left shoulder  M25 512                   Subjective: Pt reports less intense and frequent pain with AROM  Objective: See treatment diary below    Assessment: Pt requires less assistance to normalize arthrokinematics during active ABD  Plan: Cont  POC  Precautions:  PMHx: HTN, CKD II  Dx: R subacromial impingement, possible RTC tear    Manuals          Laser L subacromial space 6000J 6000J 6000J 6000J         L shoulder PROM 5 min 5 min 5 min 5 min         ABD scap assist MWM 1x10 np 1x10 1x5                      Neuro Re-Ed             TB horizontal ABD YTB  3x15 YTB  3x15 Supine  YTB  3x15 Supine  YTB  3x15 supine  YTB  3x20        TB rows Silver  3x20 Silver  3x20 Silver  3x25 Silver  3x25         Wall slides FF 1x20 1x10 2x10 nv 2x10        Standing FF, ABD AAROM 2x10 ABD only  2x15 ABD  3x15 ABD  3x15         Prone ER  3x10 3x10 3x12                                   Ther Ex             Pulleys: FF, ABD 5"x15  ea 5"x15 ea 5"x15 ea 5"x15 ea         TB ER Blue  3x15 Blue  3x15 Blue  3x18 Blue  3x18 Blue  3x20                                                                                      Ther Activity                                       Gait Training                                       Modalities

## 2022-08-02 ENCOUNTER — OFFICE VISIT (OUTPATIENT)
Dept: PHYSICAL THERAPY | Facility: CLINIC | Age: 77
End: 2022-08-02
Payer: MEDICARE

## 2022-08-02 DIAGNOSIS — M25.512 ACUTE PAIN OF LEFT SHOULDER: Primary | ICD-10-CM

## 2022-08-02 PROCEDURE — 97110 THERAPEUTIC EXERCISES: CPT

## 2022-08-02 PROCEDURE — 97140 MANUAL THERAPY 1/> REGIONS: CPT

## 2022-08-02 NOTE — PROGRESS NOTES
Daily Note     Today's date: 2022  Patient name: Shanika Crump  : 1945  MRN: [id number]  Referring provider: Dayan Sanchez MD  Dx:   Encounter Diagnosis     ICD-10-CM    1  Acute pain of left shoulder  M25 512        Start Time: 1100  Stop Time: 1145  Total time in clinic (min): 45 minutes    Subjective: Pt notes improvements and strength and ROM since the start of PT  Objective: See treatment diary below    Assessment: Patient demonstrated strong understanding of TE with min VCs  Good response to additional reps this session  Appropriate fatigue exhibited post session  PT would benefit from continued PT  Plan: Continue with PT POC  Precautions:  PMHx: HTN, CKD II  Dx: R subacromial impingement, possible RTC tear    Manuals         Laser L subacromial space 6000J 6000J 6000J 6000J 6000J        L shoulder PROM 5 min 5 min 5 min 5 min 5min        ABD scap assist MWM 1x10 np 1x10 1x5 NV                     Neuro Re-Ed             TB horizontal ABD YTB  3x15 YTB  3x15 Supine  YTB  3x15 Supine  YTB  3x15 supine  YTB  3x20        TB rows Silver  3x20 Silver  3x20 Silver  3x25 Silver  3x25         Wall slides FF 1x20 1x10 2x10 nv 2x10        Standing FF, ABD AAROM 2x10 ABD only  2x15 ABD  3x15 ABD  3x15 ABD  3x15        Prone ER  3x10 3x10 3x12 3x12                                  Ther Ex             Pulleys: FF, ABD 5"x15  ea 5"x15 ea 5"x15 ea 5"x15 ea 2 5 min ea        TB ER Blue  3x15 Blue  3x15 Blue  3x18 Blue  3x18 Blue  3x20                                                                                      Ther Activity                                       Gait Training                                       Modalities

## 2022-08-04 ENCOUNTER — OFFICE VISIT (OUTPATIENT)
Dept: PHYSICAL THERAPY | Facility: CLINIC | Age: 77
End: 2022-08-04
Payer: MEDICARE

## 2022-08-04 DIAGNOSIS — M25.512 ACUTE PAIN OF LEFT SHOULDER: Primary | ICD-10-CM

## 2022-08-04 PROCEDURE — 97112 NEUROMUSCULAR REEDUCATION: CPT | Performed by: PHYSICAL THERAPY ASSISTANT

## 2022-08-04 PROCEDURE — 97110 THERAPEUTIC EXERCISES: CPT | Performed by: PHYSICAL THERAPY ASSISTANT

## 2022-08-04 NOTE — PROGRESS NOTES
Daily Note     Today's date: 2022  Patient name: Alphonso Meyer  : 1945  MRN: [id number]  Referring provider: Rema Mckeon MD  Dx:   Encounter Diagnosis     ICD-10-CM    1  Acute pain of left shoulder  M25 512                   Subjective: No new complaints  Objective: See treatment diary below    Assessment: Good tolerance to TE as noted  Pt remains appropriately challenged by TE as prescribed  Pt demonstrates good technique with TE and would benefit from continued therapy to address remaining deficits in strength and ROM  Plan: Continue with PT POC  Precautions:  PMHx: HTN, CKD II  Dx: R subacromial impingement, possible RTC tear    Manuals        Laser L subacromial space 6000J 6000J 6000J 6000J 6000J 6000J       L shoulder PROM 5 min 5 min 5 min 5 min 5min 5 min       ABD scap assist MWM 1x10 np 1x10 1x5 NV                     Neuro Re-Ed             TB horizontal ABD YTB  3x15 YTB  3x15 Supine  YTB  3x15 Supine  YTB  3x15 supine  YTB  3x20 supine YTB  3x20       TB rows Silver  3x20 Silver  3x20 Silver  3x25 Silver  3x25  Silver  3x25       Wall slides FF 1x20 1x10 2x10 nv 2x10        Standing FF, ABD AAROM 2x10 ABD only  2x15 ABD  3x15 ABD  3x15 ABD  3x15 ABD 3x15       Prone ER  3x10 3x10 3x12 3x12 3x12                                 Ther Ex             Pulleys: FF, ABD 5"x15  ea 5"x15 ea 5"x15 ea 5"x15 ea 2 5 min ea 2 5' ea       TB ER Blue  3x15 Blue  3x15 Blue  3x18 Blue  3x18 Blue  3x20 Blue  3x20                                                                                     Ther Activity                                       Gait Training                                       Modalities

## 2022-08-09 ENCOUNTER — OFFICE VISIT (OUTPATIENT)
Dept: PHYSICAL THERAPY | Facility: CLINIC | Age: 77
End: 2022-08-09
Payer: MEDICARE

## 2022-08-09 DIAGNOSIS — M25.512 ACUTE PAIN OF LEFT SHOULDER: Primary | ICD-10-CM

## 2022-08-09 PROCEDURE — 97112 NEUROMUSCULAR REEDUCATION: CPT

## 2022-08-09 PROCEDURE — 97110 THERAPEUTIC EXERCISES: CPT

## 2022-08-09 NOTE — PROGRESS NOTES
Daily Note     Today's date: 2022  Patient name: Cyrus Mark  : 1945  MRN: [id number]  Referring provider: Lilibeth Grant MD  Dx:   Encounter Diagnosis     ICD-10-CM    1  Acute pain of left shoulder  M25 512        Start Time: 1030  Stop Time: 1115  Total time in clinic (min): 45 minutes    Subjective: Patient notes minor soreness  Notes overall improvements with function  Objective: See treatment diary below    Assessment: Pt remains appropriately challenged by TE as prescribed  Pt demonstrates good technique with TE and would benefit from continued therapy to address remaining deficits in strength and ROM  Plan: Continue with PT POC  Precautions:  PMHx: HTN, CKD II  Dx: R subacromial impingement, possible RTC tear    Manuals       Laser L subacromial space 6000J 6000J 6000J 6000J 6000J 6000J 6000J      L shoulder PROM 5 min 5 min 5 min 5 min 5min 5 min 5 min      ABD scap assist MWM 1x10 np 1x10 1x5 NV                     Neuro Re-Ed             TB horizontal ABD YTB  3x15 YTB  3x15 Supine  YTB  3x15 Supine  YTB  3x15 supine  YTB  3x20 supine YTB  3x20 supine YTB  3x20      TB rows Silver  3x20 Silver  3x20 Silver  3x25 Silver  3x25  Silver  3x25 Silver  3x25      Wall slides FF 1x20 1x10 2x10 nv 2x10        Standing FF, ABD AAROM 2x10 ABD only  2x15 ABD  3x15 ABD  3x15 ABD  3x15 ABD 3x15 ABD 3x15      Prone ER  3x10 3x10 3x12 3x12 3x12 3x12                                Ther Ex             Pulleys: FF, ABD 5"x15  ea 5"x15 ea 5"x15 ea 5"x15 ea 2 5 min ea 2 5' ea 2 5' ea      TB ER Blue  3x15 Blue  3x15 Blue  3x18 Blue  3x18 Blue  3x20 Blue  3x20 Blue  3x20                                                                                    Ther Activity                                       Gait Training                                       Modalities

## 2022-08-11 ENCOUNTER — OFFICE VISIT (OUTPATIENT)
Dept: PHYSICAL THERAPY | Facility: CLINIC | Age: 77
End: 2022-08-11
Payer: MEDICARE

## 2022-08-11 DIAGNOSIS — M25.512 ACUTE PAIN OF LEFT SHOULDER: Primary | ICD-10-CM

## 2022-08-11 PROCEDURE — 97112 NEUROMUSCULAR REEDUCATION: CPT

## 2022-08-11 PROCEDURE — 97110 THERAPEUTIC EXERCISES: CPT

## 2022-08-11 NOTE — PROGRESS NOTES
Daily Note     Today's date: 2022  Patient name: Earline Dean  : 1945  MRN: [id number]  Referring provider: Nova Sawyer MD  Dx:   Encounter Diagnosis     ICD-10-CM    1  Acute pain of left shoulder  M25 512                   Subjective: Patient reports feeling much better with being able to lift his arm out to the side  Objective: See treatment diary below    Assessment: Pt demonstrated increased RTC strength and pain-free shoulder PROM  Pt demonstrated mild pain with functional shoulder abduction AROM  Plan: Continue with PT POC  Precautions:  PMHx: HTN, CKD II  Dx: R subacromial impingement, possible RTC tear    Manuals      Laser L subacromial space 6000J 6000J 6000J 6000J 6000J 6000J 6000J 6000J     L shoulder PROM 5 min 5 min 5 min 5 min 5min 5 min 5 min 5 min     ABD scap assist MWM 1x10 np 1x10 1x5 NV                     Neuro Re-Ed             TB horizontal ABD YTB  3x15 YTB  3x15 Supine  YTB  3x15 Supine  YTB  3x15 supine  YTB  3x20 supine YTB  3x20 supine YTB  3x20 supine YTB  3x20     TB rows Silver  3x20 Silver  3x20 Silver  3x25 Silver  3x25  Silver  3x25 Silver  3x25 Silver  3x25     Wall slides FF 1x20 1x10 2x10 nv 2x10        Standing FF, ABD AAROM 2x10 ABD only  2x15 ABD  3x15 ABD  3x15 ABD  3x15 ABD 3x15 ABD 3x15 ABD 3x15     Prone ER  3x10 3x10 3x12 3x12 3x12 3x12 3x12                               Ther Ex             Pulleys: FF, ABD 5"x15  ea 5"x15 ea 5"x15 ea 5"x15 ea 2 5 min ea 2 5' ea 2 5' ea 2 5' ea     TB ER Blue  3x15 Blue  3x15 Blue  3x18 Blue  3x18 Blue  3x20 Blue  3x20 Blue  3x20 Black  3x15                                                                                   Ther Activity                                       Gait Training                                       Modalities

## 2022-08-15 ENCOUNTER — OFFICE VISIT (OUTPATIENT)
Dept: PHYSICAL THERAPY | Facility: CLINIC | Age: 77
End: 2022-08-15
Payer: MEDICARE

## 2022-08-15 DIAGNOSIS — M25.512 ACUTE PAIN OF LEFT SHOULDER: Primary | ICD-10-CM

## 2022-08-15 PROCEDURE — 97110 THERAPEUTIC EXERCISES: CPT

## 2022-08-15 PROCEDURE — 97112 NEUROMUSCULAR REEDUCATION: CPT

## 2022-08-18 ENCOUNTER — OFFICE VISIT (OUTPATIENT)
Dept: PHYSICAL THERAPY | Facility: CLINIC | Age: 77
End: 2022-08-18
Payer: MEDICARE

## 2022-08-18 DIAGNOSIS — M25.512 ACUTE PAIN OF LEFT SHOULDER: Primary | ICD-10-CM

## 2022-08-18 PROCEDURE — 97112 NEUROMUSCULAR REEDUCATION: CPT

## 2022-08-18 PROCEDURE — 97110 THERAPEUTIC EXERCISES: CPT

## 2022-08-18 NOTE — PROGRESS NOTES
Daily Note     Today's date: 2022  Patient name: Sunitha Calderon  : 1945  MRN: [id number]  Referring provider: Nieves Nix MD  Dx:   Encounter Diagnosis     ICD-10-CM    1  Acute pain of left shoulder  M25 512                   Subjective: Patient reports feeling a little shoulder soreness yesterday of unknown cause, no pain currently  Objective: See treatment diary below    Assessment: Pt demonstrated right shoulder soreness initially with shoulder flexion when using pulleys, but pain resolved with remainder of TE program  Pt demonstrated full pain-free shoulder elevation PROM  Plan: Continue with PT POC  Precautions:  PMHx: HTN, CKD II  Dx: R subacromial impingement, possible RTC tear    Manuals 7/19 7/21 7/26 7/28 8/2 8/4 8/9 8/11 8/15 8/18   Laser L subacromial space 6000J 6000J 6000J 6000J 6000J 6000J 6000J 6000J 6000J 6000J   L shoulder PROM 5 min 5 min 5 min 5 min 5min 5 min 5 min 5 min 5 min 5 min   ABD scap assist MWM 1x10 np 1x10 1x5 NV                     Neuro Re-Ed             TB horizontal ABD YTB  3x15 YTB  3x15 Supine  YTB  3x15 Supine  YTB  3x15 supine  YTB  3x20 supine YTB  3x20 supine YTB  3x20 supine YTB  3x20 supin  RTB  3x15 supine  RTB  3x15   TB rows Silver  3x20 Silver  3x20 Silver  3x25 Silver  3x25  Silver  3x25 Silver  3x25 Silver  3x25 Silver  3x25 Silver  3x25   Wall slides FF 1x20 1x10 2x10 nv 2x10        Standing FF, ABD AAROM 2x10 ABD only  2x15 ABD  3x15 ABD  3x15 ABD  3x15 ABD 3x15 ABD 3x15 ABD 3x15 ABD  3x15 ABD  3x15   Prone ER  3x10 3x10 3x12 3x12 3x12 3x12 3x12 3x12 3x12                             Ther Ex             Pulleys: FF, ABD 5"x15  ea 5"x15 ea 5"x15 ea 5"x15 ea 2 5 min ea 2 5' ea 2 5' ea 2 5' ea 2 5' ea 2 5' ea   TB ER Blue  3x15 Blue  3x15 Blue  3x18 Blue  3x18 Blue  3x20 Blue  3x20 Blue  3x20 Black  3x15 Black  3x15 Black  3x15                                                                                 Ther Activity Gait Training                                       Modalities

## 2022-08-23 ENCOUNTER — OFFICE VISIT (OUTPATIENT)
Dept: PHYSICAL THERAPY | Facility: CLINIC | Age: 77
End: 2022-08-23
Payer: MEDICARE

## 2022-08-23 DIAGNOSIS — M25.512 ACUTE PAIN OF LEFT SHOULDER: Primary | ICD-10-CM

## 2022-08-23 PROCEDURE — 97112 NEUROMUSCULAR REEDUCATION: CPT

## 2022-08-23 PROCEDURE — 97110 THERAPEUTIC EXERCISES: CPT

## 2022-08-23 NOTE — PROGRESS NOTES
Daily Note     Today's date: 2022  Patient name: Rebeka Mathews  : 1945  MRN: [id number]  Referring provider: Jim Gary MD  Dx:   Encounter Diagnosis     ICD-10-CM    1  Acute pain of left shoulder  M25 512                   Subjective: Pt reports his L shldr was a little achy over the weekend, but took a day or two off from performing HEP and is feeling better  Objective: See treatment diary below      Assessment: Tolerated treatment well  Slight soreness/achiness with pullies in FF  Most challenged with prone ER; slight UT compensation during this movement, but was able to mostly self correct once cues provided  Patient would benefit from continued PT to further improve strength, decrease pain, and maximize overall function  Plan: Continue per plan of care  Precautions:  PMHx: HTN, CKD II  Dx: R subacromial impingement, possible RTC tear    Manuals 8/23   7/28 8/2 8/4 8/9 8/11 8/15 8/18   Laser L subacromial space 6000J   6000J 6000J 6000J 6000J 6000J 6000J 6000J   L shoulder PROM 5 min   5 min 5min 5 min 5 min 5 min 5 min 5 min   ABD scap assist MWM    1x5 NV                     Neuro Re-Ed             TB horizontal ABD supine  RTB  3x15   Supine  YTB  3x15 supine  YTB  3x20 supine YTB  3x20 supine YTB  3x20 supine YTB  3x20 supin  RTB  3x15 supine  RTB  3x15   TB rows Silver  3x25   Silver  3x25  Silver  3x25 Silver  3x25 Silver  3x25 Silver  3x25 Silver  3x25   Wall slides FF    nv 2x10        Standing FF, ABD AAROM ABD  3x15   ABD  3x15 ABD  3x15 ABD 3x15 ABD 3x15 ABD 3x15 ABD  3x15 ABD  3x15   Prone ER 3x12   3x12 3x12 3x12 3x12 3x12 3x12 3x12                             Ther Ex             Pulleys: FF, ABD 2 5' ea   5"x15 ea 2 5 min ea 2 5' ea 2 5' ea 2 5' ea 2 5' ea 2 5' ea   TB ER Black  3x15   Blue  3x18 Blue  3x20 Blue  3x20 Blue  3x20 Black  3x15 Black  3x15 Black  3x15                                                                                 Ther Activity                                       Gait Training                                       Modalities

## 2022-08-25 ENCOUNTER — APPOINTMENT (OUTPATIENT)
Dept: PHYSICAL THERAPY | Facility: CLINIC | Age: 77
End: 2022-08-25
Payer: MEDICARE

## 2022-08-26 ENCOUNTER — EVALUATION (OUTPATIENT)
Dept: PHYSICAL THERAPY | Facility: CLINIC | Age: 77
End: 2022-08-26
Payer: MEDICARE

## 2022-08-26 DIAGNOSIS — M25.512 ACUTE PAIN OF LEFT SHOULDER: Primary | ICD-10-CM

## 2022-08-26 PROCEDURE — 97112 NEUROMUSCULAR REEDUCATION: CPT | Performed by: PHYSICAL THERAPIST

## 2022-08-26 PROCEDURE — 97110 THERAPEUTIC EXERCISES: CPT | Performed by: PHYSICAL THERAPIST

## 2022-08-26 NOTE — PROGRESS NOTES
PT Re-Evaluation     Today's date: 2022  Patient name: Mala Courser  : 1945  MRN: [id number]  Referring provider: Austin Diaz MD  Dx:   Encounter Diagnosis     ICD-10-CM    1  Acute pain of left shoulder  M25 512                   Assessment  Assessment details: Pt demonstrates full active and passive ROM, improved strength and arthrokinematics, and improved pain with ADLs  He continues to have generalized L shoulder soreness that is mostly likely secondary to over-training, especially as far as his HEP is concerned  He demonstrated a good response to a modified training load and will continue to benefit from skilled PT services to address his remaining impairments in order to resolve soreness and improve strength in OH positions for improved ADL tolerance and possible return to recreational volleyball  Impairments: abnormal muscle firing, abnormal or restricted ROM, abnormal movement, activity intolerance, impaired physical strength, lacks appropriate home exercise program, pain with function, poor posture  and poor body mechanics  Understanding of Dx/Px/POC: good   Prognosis: good    Goals  STG - 4 wks  1) pt will demonstrate 120 deg active FE (met)  2) pt will improve pain 50% (met)    LTG - 8-12 wks  1) pt will demonstrate full AROM (met)  2) pt will improve pain 90% (not met)    Plan  Planned modality interventions: low level laser therapy  Planned therapy interventions: joint mobilization, manual therapy, body mechanics training, neuromuscular re-education, strengthening, stretching, patient education, postural training, therapeutic activities, therapeutic exercise, home exercise program, functional ROM exercises and flexibility  Frequency: 2x week  Duration in weeks: 6  Treatment plan discussed with: patient        Subjective Evaluation    History of Present Illness  Mechanism of injury: Pt is a 68 y o  male with PMHx significant for HTN, CKD II   He reports a resolution of any sharp pain with functional reaching and no difficulty lifting but now notes intermittent L shoulder soreness with or without activity  He reports doing high-repetition ABD AROM vs AAROM during his HEP over the past couple weeks  Pain  Current pain ratin  At best pain ratin  At worst pain ratin  Location: superior, lateral L shoulder and upper arm  Quality: dull ache  Relieving factors: rest and heat  Aggravating factors: overhead activity  Progression: improved    Hand dominance: right      Diagnostic Tests  No diagnostic tests performed  Treatments  Previous treatment: physical therapy  Current treatment: physical therapy  Patient Goals  Patient goals for therapy: decreased pain, increased motion and return to sport/leisure activities          Objective     Postural Observations  Seated posture: poor  Standing posture: poor  Correction of posture: makes symptoms better        Active Range of Motion   Left Shoulder   Flexion: 150 degrees   Abduction: 150 degrees   External rotation BTH: C7   Internal rotation BTB: T12 with pain    Right Shoulder   Flexion: 150 degrees   Abduction: 150 degrees   External rotation BTH: C7   Internal rotation BTB: T12     Passive Range of Motion   Left Shoulder   Flexion: 150 degrees   Abduction: 150 degrees   External rotation 90°: 80 degrees   Internal rotation 90°: 60 degrees     Right Shoulder   Flexion: 160 degrees   Abduction: 150 degrees   External rotation 90°: 100 degrees   Internal rotation 90°: 40 degrees     Scapular Mobility   Left Shoulder   Scapular Mobility with Shoulder to 90° FF   Scapular winging: minimal    Scapular Mobility beyond 90° FF   Scapular winging: minimal  Scapular elevation: minimal    Joint Play   Left Shoulder  Hypomobile in the inferior capsule      Strength/Myotome Testing     Left Shoulder     Planes of Motion   Flexion: 4   Abduction: 4   External rotation at 0°: 4   Internal rotation at 0°: 4+     Right Shoulder     Planes of Motion Flexion: 4+   Abduction: 4+   External rotation at 0°: 4+   Internal rotation at 0°: 4+     Tests     Left Shoulder   Positive Hawkin's  Negative apprehension, drop arm, external rotation lag sign, Neer's and painful arc  Precautions:  PMHx: HTN, CKD II  Dx: R subacromial impingement, possible RTC tear  POC: Progress training load once pain soreness at rest resolves    Manuals 8/26            Laser L subacromial space 6000J            L shoulder PROM 5 min                                      Neuro Re-Ed             Supine TB horizontal ABD RTB  3x15            TB rows Silver  3x25                         Standing ABD AAROM 3x15 1x15           Prone ER                                       Ther Ex             Pulleys: FF, ABD 5"x15  ea            TB ER Black  3x15            Modified sleeper stretch 10"x3                                                                             Ther Activity                                       Gait Training                                       Modalities

## 2022-08-30 ENCOUNTER — OFFICE VISIT (OUTPATIENT)
Dept: PHYSICAL THERAPY | Facility: CLINIC | Age: 77
End: 2022-08-30
Payer: MEDICARE

## 2022-08-30 DIAGNOSIS — M25.512 ACUTE PAIN OF LEFT SHOULDER: Primary | ICD-10-CM

## 2022-08-30 PROCEDURE — 97112 NEUROMUSCULAR REEDUCATION: CPT

## 2022-08-30 PROCEDURE — 97110 THERAPEUTIC EXERCISES: CPT

## 2022-08-30 NOTE — PROGRESS NOTES
Daily Note    Today's date: 2022  Patient name: Gisele Mckinley  : 1945  MRN: [id number]  Referring provider: Pham Velez MD  Dx:   Encounter Diagnosis     ICD-10-CM    1  Acute pain of left shoulder  M25 512        Start Time: 1130  Stop Time: 1210  Total time in clinic (min): 40 minutes    Subjective: Patent reports continued ache in his L shoulder  He states, "I'm trying not to over do it with my exercises"  Objective: See treatment diary below    Assessment: Tolerated treatment fair  Patient demonstrated fatigue post treatment, exhibited good technique with therapeutic exercises and would benefit from continued PT  Correction required during modified sleeper stretch  Plan: Continue per plan of care  Precautions:  PMHx: HTN, CKD II  Dx: R subacromial impingement, possible RTC tear  POC: Progress training load once pain soreness at rest resolves    Manuals            Laser L subacromial space 6000J 6000J           L shoulder PROM 5 min 5 min                                     Neuro Re-Ed             Supine TB horizontal ABD RTB  3x15 RTB  3x15           TB rows Silver  3x25 Silver  3x25                        Standing ABD AAROM 3x15 1x15           Prone ER                                       Ther Ex             Pulleys: FF, ABD 5"x15  ea 5"x15 ea           TB ER Black  3x15 Black  3x15           Modified sleeper stretch 10"x3 10"x3                                                                            Ther Activity                                       Gait Training                                       Modalities

## 2022-09-01 ENCOUNTER — OFFICE VISIT (OUTPATIENT)
Dept: PHYSICAL THERAPY | Facility: CLINIC | Age: 77
End: 2022-09-01
Payer: MEDICARE

## 2022-09-01 DIAGNOSIS — M25.512 ACUTE PAIN OF LEFT SHOULDER: Primary | ICD-10-CM

## 2022-09-01 PROCEDURE — 97112 NEUROMUSCULAR REEDUCATION: CPT | Performed by: PHYSICAL THERAPIST

## 2022-09-01 PROCEDURE — 97110 THERAPEUTIC EXERCISES: CPT | Performed by: PHYSICAL THERAPIST

## 2022-09-01 PROCEDURE — 97140 MANUAL THERAPY 1/> REGIONS: CPT | Performed by: PHYSICAL THERAPIST

## 2022-09-01 NOTE — PROGRESS NOTES
Daily Note    Today's date: 2022  Patient name: Kimberlee Laguerre  : 1945  MRN: [id number]  Referring provider: Kenneth Hoskins MD  Dx:   Encounter Diagnosis     ICD-10-CM    1  Acute pain of left shoulder  M25 512                   Subjective: Pt reports mildly improved baseline L shoulder achiness  He reports pain during modified sleeper stretch but is unsure if his form is correct  Objective: See treatment diary below  Functional IR: B: T10, no pain  Posture: mod FH, L>R scapula elevation  Ttp: L UT  Repeated motions: protrusion: (-), retraction: (-)  Pt abolished L shoulder achiness following L UT STM/stretches  Added repeated c/s retraction and L UT stretch to HEP, instructed to hold ABD PROM    Assessment: Pt demonstrated L UT hypertonicity and postural abnormalities that are contributing to L shoulder achiness  Plan: Assess response nv  Precautions:  PMHx: HTN, CKD II  Dx: R subacromial impingement, possible RTC tear  POC: Progress training load once pain soreness at rest resolves    Manuals           Laser L subacromial space 6000J 6000J 6000J          L shoulder PROM 5 min 5 min 3 min          L UT Graston/STM   4 min                       Neuro Re-Ed             Supine TB horizontal ABD RTB  3x15 RTB  3x15 HEP RTB  3x15         TB rows Silver  3x25 Silver  3x25 Silver  3x30                       Standing ABD AAROM 3x15 1x15 held          Prone ER             C/s retraction   1x10                       Ther Ex             Pulleys: FF, ABD 5"x15  ea 5"x15 ea 5"x15 ea          TB ER Black  3x15 Black  3x15 Black  3x15          Modified sleeper stretch 10"x3 10"x3 held          L UT stretch   10"x3                                                              Ther Activity                                       Gait Training                                       Modalities

## 2022-09-06 ENCOUNTER — OFFICE VISIT (OUTPATIENT)
Dept: PHYSICAL THERAPY | Facility: CLINIC | Age: 77
End: 2022-09-06
Payer: MEDICARE

## 2022-09-06 DIAGNOSIS — M25.512 ACUTE PAIN OF LEFT SHOULDER: Primary | ICD-10-CM

## 2022-09-06 PROCEDURE — 97112 NEUROMUSCULAR REEDUCATION: CPT

## 2022-09-06 PROCEDURE — 97110 THERAPEUTIC EXERCISES: CPT

## 2022-09-06 PROCEDURE — 97140 MANUAL THERAPY 1/> REGIONS: CPT

## 2022-09-06 NOTE — PROGRESS NOTES
Daily Note    Today's date: 2022  Patient name: Eleni Hooper  : 1945  MRN: [id number]  Referring provider: Obie Solo MD  Dx:   Encounter Diagnosis     ICD-10-CM    1  Acute pain of left shoulder  M25 512        Start Time: 1030  Stop Time: 1115  Total time in clinic (min): 45 minutes    Subjective: Patient reports slight improvement with shoulder aches  Reports less limits with ADL's but occasionally a task will "remind him it's there"  Objective: See treatment diary below    Assessment: Patient responding well to repeated cervical retraction and L UT IASTM thus far  He required cueing to eliminate protrusion following cervical retraction  Plan: Assess response nv  Precautions:  PMHx: HTN, CKD II  Dx: R subacromial impingement, possible RTC tear  POC: Progress training load once pain soreness at rest resolves    Manuals          Laser L subacromial space 6000J 6000J 6000J 6000J         L shoulder PROM 5 min 5 min 3 min 4 min         L UT Graston/STM   4 min 4 min                      Neuro Re-Ed             Supine TB horizontal ABD RTB  3x15 RTB  3x15 HEP RTB  3x15         TB rows Silver  3x25 Silver  3x25 Silver  3x30 Silver  3x30                      Standing ABD AAROM 3x15 1x15 held          Prone ER             C/s retraction   1x10 1x10                      Ther Ex             Pulleys: FF, ABD 5"x15  ea 5"x15 ea 5"x15 ea 5"x15 ea         TB ER Black  3x15 Black  3x15 Black  3x15 Black  3x15         Modified sleeper stretch 10"x3 10"x3 held          L UT stretch   10"x3 10"x3                                                             Ther Activity                                       Gait Training                                       Modalities

## 2022-09-08 ENCOUNTER — OFFICE VISIT (OUTPATIENT)
Dept: PHYSICAL THERAPY | Facility: CLINIC | Age: 77
End: 2022-09-08
Payer: MEDICARE

## 2022-09-08 DIAGNOSIS — M25.512 ACUTE PAIN OF LEFT SHOULDER: Primary | ICD-10-CM

## 2022-09-08 PROCEDURE — 97112 NEUROMUSCULAR REEDUCATION: CPT | Performed by: PHYSICAL THERAPIST

## 2022-09-08 PROCEDURE — 97110 THERAPEUTIC EXERCISES: CPT | Performed by: PHYSICAL THERAPIST

## 2022-09-08 PROCEDURE — 97140 MANUAL THERAPY 1/> REGIONS: CPT | Performed by: PHYSICAL THERAPIST

## 2022-09-08 NOTE — PROGRESS NOTES
Daily Note    Today's date: 2022  Patient name: Mago Valerio  : 1945  MRN: [id number]  Referring provider: Remi Hopkins MD  Dx:   Encounter Diagnosis     ICD-10-CM    1  Acute pain of left shoulder  M25 512                   Subjective: Pt reports a resolution of resting soreness, mild intermittent pain with functional reaching or lifting  Objective: See treatment diary below    Assessment: Pt demonstrated improved pain and AROM following c/s retraction  Plan: Assess response nv  Precautions:  PMHx: HTN, CKD II  Dx: R subacromial impingement, possible RTC tear  POC: Progress training load once pain soreness at rest resolves    Manuals         Laser L subacromial space 6000J 6000J 6000J 6000J 6000J        L shoulder PROM 5 min 5 min 3 min 4 min 3 min        L UT Graston/STM   4 min 4 min 4 min                     Neuro Re-Ed             Supine TB horizontal ABD RTB  3x15 RTB  3x15 HEP RTB  3x15 RTB  3x15        TB rows Silver  3x25 Silver  3x25 Silver  3x30 Silver  3x30 Silver  3x30                     Standing ABD AAROM 3x15 1x15 held          Prone ER             C/s retraction   1x10 1x10 3x10                     Ther Ex             Pulleys: FF, ABD 5"x15  ea 5"x15 ea 5"x15 ea 5"x15 ea 5"x15 ea        TB ER Black  3x15 Black  3x15 Black  3x15 Black  3x15         Modified sleeper stretch 10"x3 10"x3 held          L UT stretch   10"x3 10"x3 10"x3        TB ER 45 deg ABD     RTB  3x10        Seated t/s extension over chair     3"x10                                  Ther Activity                                       Gait Training                                       Modalities

## 2022-09-13 ENCOUNTER — OFFICE VISIT (OUTPATIENT)
Dept: PHYSICAL THERAPY | Facility: CLINIC | Age: 77
End: 2022-09-13
Payer: MEDICARE

## 2022-09-13 DIAGNOSIS — M25.512 ACUTE PAIN OF LEFT SHOULDER: Primary | ICD-10-CM

## 2022-09-13 PROCEDURE — 97140 MANUAL THERAPY 1/> REGIONS: CPT | Performed by: PHYSICAL THERAPIST

## 2022-09-13 PROCEDURE — 97110 THERAPEUTIC EXERCISES: CPT | Performed by: PHYSICAL THERAPIST

## 2022-09-13 PROCEDURE — 97112 NEUROMUSCULAR REEDUCATION: CPT | Performed by: PHYSICAL THERAPIST

## 2022-09-13 NOTE — PROGRESS NOTES
Daily Note    Today's date: 2022  Patient name: Alexys Oseguera  : 1945  MRN: [id number]  Referring provider: Aleksandra Douglas MD  Dx:   Encounter Diagnosis     ICD-10-CM    1  Acute pain of left shoulder  M25 512                   Subjective: Pt reports no pain since last visit  Objective: See treatment diary below    Assessment: Pt demonstrated a good response to postural correction and higher-level RTC strengthening  Plan: Cont  POC  Precautions:  PMHx: HTN, CKD II  Dx: R subacromial impingement, possible RTC tear  POC: scapular/RTC strengthening, postural correction/education    Manuals        Laser L subacromial space 6000J 6000J 6000J 6000J 6000J 5000J       L shoulder PROM 5 min 5 min 3 min 4 min 3 min 3 min       L UT Graston/STM   4 min 4 min 4 min 4 min                    Neuro Re-Ed             Supine TB horizontal ABD RTB  3x15 RTB  3x15 HEP RTB  3x15 RTB  3x15 RTB  3x20       TB rows Silver  3x25 Silver  3x25 Silver  3x30 Silver  3x30 Silver  3x30 Silver  3x30                    Standing ABD AAROM 3x15 1x15 held          Prone ER             C/s retraction   1x10 1x10 3x10 3x10                    Ther Ex             Pulleys: FF, ABD 5"x15  ea 5"x15 ea 5"x15 ea 5"x15 ea 5"x15 ea 5"x15 ea       TB ER Black  3x15 Black  3x15 Black  3x15 Black  3x15         Modified sleeper stretch 10"x3 10"x3 held          L UT stretch   10"x3 10"x3 10"x3 10"x3       TB ER 45 deg ABD     RTB  3x10        Seated t/s extension over chair     3"x10 3"x10       TB ER 90 deg ABD      YTB  3x10                    Ther Activity                                       Gait Training                                       Modalities

## 2022-09-15 ENCOUNTER — OFFICE VISIT (OUTPATIENT)
Dept: PHYSICAL THERAPY | Facility: CLINIC | Age: 77
End: 2022-09-15
Payer: MEDICARE

## 2022-09-15 DIAGNOSIS — M25.512 ACUTE PAIN OF LEFT SHOULDER: Primary | ICD-10-CM

## 2022-09-15 PROCEDURE — 97110 THERAPEUTIC EXERCISES: CPT

## 2022-09-15 PROCEDURE — 97112 NEUROMUSCULAR REEDUCATION: CPT

## 2022-09-15 PROCEDURE — 97140 MANUAL THERAPY 1/> REGIONS: CPT

## 2022-09-15 NOTE — PROGRESS NOTES
Daily Note    Today's date: 9/15/2022  Patient name: Evi Chambers  : 1945  MRN: [id number]  Referring provider: Todd Mei MD  Dx:   Encounter Diagnosis     ICD-10-CM    1  Acute pain of left shoulder  M25 512                   Subjective: Pt reports no shoulder pain but still has some difficulty with overhead reaching  Objective: See treatment diary below    Assessment: Pt demonstrated pain-free tolerance to TE program, difficulty limiting UT compensation with RTC and scapular strengthening  Plan: Cont  POC  Precautions:  PMHx: HTN, CKD II  Dx: R subacromial impingement, possible RTC tear  POC: scapular/RTC strengthening, postural correction/education    Manuals 8/26 8/30 9/1 9/6 9/8 9/13 9/15      Laser L subacromial space 6000J 6000J 6000J 6000J 6000J 5000J 5000J      L shoulder PROM 5 min 5 min 3 min 4 min 3 min 3 min 3 min      L UT Graston/STM   4 min 4 min 4 min 4 min 4 min                   Neuro Re-Ed             Supine TB horizontal ABD RTB  3x15 RTB  3x15 HEP RTB  3x15 RTB  3x15 RTB  3x20 RTB  3x20      TB rows Silver  3x25 Silver  3x25 Silver  3x30 Silver  3x30 Silver  3x30 Silver  3x30 Silver  3x30                   Standing ABD AAROM 3x15 1x15 held          Prone ER             C/s retraction   1x10 1x10 3x10 3x10 3x10                   Ther Ex             Pulleys: FF, ABD 5"x15  ea 5"x15 ea 5"x15 ea 5"x15 ea 5"x15 ea 5"x15 ea 5"x15 ea      TB ER Black  3x15 Black  3x15 Black  3x15 Black  3x15         Modified sleeper stretch 10"x3 10"x3 held          L UT stretch   10"x3 10"x3 10"x3 10"x3 10"x3      TB ER 45 deg ABD     RTB  3x10        Seated t/s extension over chair     3"x10 3"x10 3"x10      TB ER 90 deg ABD      YTB  3x10 YTB  3x10                   Ther Activity                                       Gait Training                                       Modalities

## 2022-09-20 ENCOUNTER — OFFICE VISIT (OUTPATIENT)
Dept: PHYSICAL THERAPY | Facility: CLINIC | Age: 77
End: 2022-09-20
Payer: MEDICARE

## 2022-09-20 DIAGNOSIS — M25.512 ACUTE PAIN OF LEFT SHOULDER: Primary | ICD-10-CM

## 2022-09-20 PROCEDURE — 97110 THERAPEUTIC EXERCISES: CPT | Performed by: PHYSICAL THERAPIST

## 2022-09-20 PROCEDURE — 97112 NEUROMUSCULAR REEDUCATION: CPT | Performed by: PHYSICAL THERAPIST

## 2022-09-20 PROCEDURE — 97140 MANUAL THERAPY 1/> REGIONS: CPT | Performed by: PHYSICAL THERAPIST

## 2022-09-20 NOTE — PROGRESS NOTES
Daily Note    Today's date: 2022  Patient name: Sunitha Calderon  : 1945  MRN: [id number]  Referring provider: Nieves Nix MD  Dx:   Encounter Diagnosis     ICD-10-CM    1  Acute pain of left shoulder  M25 512                   Subjective: Pt reports no pain or difficulty with OH reaching over the weekend  Objective: See treatment diary below    Assessment: Pt demonstrates slowly improving scapular stability but not yet able to tolerate resisted horizontal ABD in standing without mild pain  Plan: Cont  POC  Precautions:  PMHx: HTN, CKD II  Dx: R subacromial impingement, possible RTC tear  POC: scapular/RTC strengthening, postural correction/education    Manuals 8/26 8/30 9/1 9/6 9/8 9/13 9/15 9/20     Laser L subacromial space 6000J 6000J 6000J 6000J 6000J 5000J 5000J 5000J     L shoulder PROM 5 min 5 min 3 min 4 min 3 min 3 min 3 min 3 min     L UT Graston/STM   4 min 4 min 4 min 4 min 4 min 4 min                  Neuro Re-Ed             Supine TB horizontal ABD RTB  3x15 RTB  3x15 HEP RTB  3x15 RTB  3x15 RTB  3x20 RTB  3x20 HEP     TB rows Silver  3x25 Silver  3x25 Silver  3x30 Silver  3x30 Silver  3x30 Silver  3x30 Silver  3x30 Silver  3x30     Standing horizontal ABD        No TB  3x10 YTB  3x10    Standing ABD AAROM 3x15 1x15 held          Prone ER             C/s retraction   1x10 1x10 3x10 3x10 3x10 3x10                  Ther Ex             Pulleys: FF, ABD 5"x15  ea 5"x15 ea 5"x15 ea 5"x15 ea 5"x15 ea 5"x15 ea 5"x15 ea 5"x15 ea     TB ER Black  3x15 Black  3x15 Black  3x15 Black  3x15         Modified sleeper stretch 10"x3 10"x3 held          L UT stretch   10"x3 10"x3 10"x3 10"x3 10"x3 10"x3     TB ER 45 deg ABD     RTB  3x10        Seated t/s extension over chair     3"x10 3"x10 3"x10 3"x10     TB ER 90 deg ABD      YTB  3x10 YTB  3x10 YTB  3x15                  Ther Activity                                       Gait Training Modalities

## 2022-09-22 ENCOUNTER — OFFICE VISIT (OUTPATIENT)
Dept: PHYSICAL THERAPY | Facility: CLINIC | Age: 77
End: 2022-09-22
Payer: MEDICARE

## 2022-09-22 DIAGNOSIS — M25.512 ACUTE PAIN OF LEFT SHOULDER: Primary | ICD-10-CM

## 2022-09-22 PROCEDURE — 97140 MANUAL THERAPY 1/> REGIONS: CPT | Performed by: PHYSICAL THERAPIST

## 2022-09-22 PROCEDURE — 97112 NEUROMUSCULAR REEDUCATION: CPT | Performed by: PHYSICAL THERAPIST

## 2022-09-22 NOTE — PROGRESS NOTES
Daily Note    Today's date: 2022  Patient name: Rebeka Mathews  : 1945  MRN: [id number]  Referring provider: Jim Gary MD  Dx:   Encounter Diagnosis     ICD-10-CM    1  Acute pain of left shoulder  M25 512                   Subjective: Pt reports good response to last visit  Objective: See treatment diary below    Assessment: Pt able to add resistance to horizontal ABD without pain today  Plan: Cont  POC  Precautions:  PMHx: HTN, CKD II  Dx: R subacromial impingement, possible RTC tear  POC: scapular/RTC strengthening, postural correction/education    Manuals 8/26 8/30 9/1 9/6 9/8 9/13 9/15 9/20 9/22    Laser L subacromial space 6000J 6000J 6000J 6000J 6000J 5000J 5000J 5000J unavailable    L shoulder PROM 5 min 5 min 3 min 4 min 3 min 3 min 3 min 3 min 3 min    L UT Graston/STM   4 min 4 min 4 min 4 min 4 min 4 min 7 min                 Neuro Re-Ed             Supine TB horizontal ABD RTB  3x15 RTB  3x15 HEP RTB  3x15 RTB  3x15 RTB  3x20 RTB  3x20 HEP     TB rows Silver  3x25 Silver  3x25 Silver  3x30 Silver  3x30 Silver  3x30 Silver  3x30 Silver  3x30 Silver  3x30 Silver  3x30    Standing horizontal ABD        No TB  3x10 YTB  3x10    Standing ABD AAROM 3x15 1x15 held          Prone ER             C/s retraction   1x10 1x10 3x10 3x10 3x10 3x10 3x10                 Ther Ex             Pulleys: FF, ABD 5"x15  ea 5"x15 ea 5"x15 ea 5"x15 ea 5"x15 ea 5"x15 ea 5"x15 ea 5"x15 ea 5"x15 ea    TB ER Black  3x15 Black  3x15 Black  3x15 Black  3x15         Modified sleeper stretch 10"x3 10"x3 held          L UT stretch   10"x3 10"x3 10"x3 10"x3 10"x3 10"x3 10"x3    TB ER 45 deg ABD     RTB  3x10        Seated t/s extension over chair     3"x10 3"x10 3"x10 3"x10 3"x15    TB ER 90 deg ABD      YTB  3x10 YTB  3x10 YTB  3x15 YTB  3x15                 Ther Activity                                       Gait Training                                       Modalities

## 2022-09-26 DIAGNOSIS — K21.9 LARYNGOPHARYNGEAL REFLUX (LPR): ICD-10-CM

## 2022-09-26 DIAGNOSIS — E78.2 MIXED HYPERLIPIDEMIA: ICD-10-CM

## 2022-09-26 DIAGNOSIS — K21.9 GASTROESOPHAGEAL REFLUX DISEASE WITHOUT ESOPHAGITIS: ICD-10-CM

## 2022-09-27 ENCOUNTER — EVALUATION (OUTPATIENT)
Dept: PHYSICAL THERAPY | Facility: CLINIC | Age: 77
End: 2022-09-27
Payer: MEDICARE

## 2022-09-27 DIAGNOSIS — M25.512 ACUTE PAIN OF LEFT SHOULDER: Primary | ICD-10-CM

## 2022-09-27 PROCEDURE — 97140 MANUAL THERAPY 1/> REGIONS: CPT | Performed by: PHYSICAL THERAPIST

## 2022-09-27 PROCEDURE — 97112 NEUROMUSCULAR REEDUCATION: CPT | Performed by: PHYSICAL THERAPIST

## 2022-09-27 PROCEDURE — 97110 THERAPEUTIC EXERCISES: CPT | Performed by: PHYSICAL THERAPIST

## 2022-09-27 RX ORDER — OMEPRAZOLE 20 MG/1
CAPSULE, DELAYED RELEASE ORAL
Qty: 90 CAPSULE | Refills: 0 | Status: SHIPPED | OUTPATIENT
Start: 2022-09-27

## 2022-09-27 RX ORDER — PRAVASTATIN SODIUM 10 MG
TABLET ORAL
Qty: 90 TABLET | Refills: 1 | Status: SHIPPED | OUTPATIENT
Start: 2022-09-27

## 2022-09-27 NOTE — PROGRESS NOTES
PT Re-Evaluation     Today's date: 2022  Patient name: Terrie Altamirano  : 1945  MRN: [id number]  Referring provider: Neli Benedict MD  Dx:   Encounter Diagnosis     ICD-10-CM    1  Acute pain of left shoulder  M25 512                   Assessment  Assessment details: Pt demonstrates full active and passive ROM, improved strength and arthrokinematics, and improved pain with ADLs  He will continue to benefit from skilled PT services to address his remaining impairments in order to further improve strength in OH positions for improved ADL tolerance and return to recreational volleyball  Impairments: abnormal muscle firing, abnormal or restricted ROM, abnormal movement, activity intolerance, impaired physical strength, lacks appropriate home exercise program, pain with function, poor posture  and poor body mechanics  Understanding of Dx/Px/POC: good   Prognosis: good    Goals  STG - 4 wks  1) pt will demonstrate 120 deg active FE (met)  2) pt will improve pain 50% (met)    LTG - 8-12 wks  1) pt will demonstrate full AROM (met)  2) pt will improve pain 90% (not met)    Plan  Planned modality interventions: low level laser therapy  Planned therapy interventions: joint mobilization, manual therapy, body mechanics training, neuromuscular re-education, strengthening, stretching, patient education, postural training, therapeutic activities, therapeutic exercise, home exercise program, functional ROM exercises and flexibility  Frequency: 2x week  Duration in weeks: 6  Treatment plan discussed with: patient        Subjective Evaluation    History of Present Illness  Mechanism of injury: Pt is a 68 y o  male with PMHx significant for HTN, CKD II  He reports 3/10 L shoulder pain at worst with heavy lifting OH    Pain  Current pain ratin  At best pain ratin  At worst pain rating: 3  Location: superior, lateral L shoulder and upper arm  Quality: dull ache  Relieving factors: rest and heat  Aggravating factors: overhead activity and lifting  Progression: improved    Hand dominance: right      Diagnostic Tests  No diagnostic tests performed  Treatments  Previous treatment: physical therapy  Current treatment: physical therapy  Patient Goals  Patient goals for therapy: decreased pain, increased motion and return to sport/leisure activities          Objective     Postural Observations  Seated posture: poor  Standing posture: poor  Correction of posture: makes symptoms better        Active Range of Motion   Left Shoulder   Flexion: 150 degrees   Abduction: 150 degrees   External rotation BTH: C7   Internal rotation BTB: T12     Right Shoulder   Flexion: 150 degrees   Abduction: 150 degrees   External rotation BTH: C7   Internal rotation BTB: T12     Passive Range of Motion   Left Shoulder   Flexion: 150 degrees   Abduction: 150 degrees   External rotation 90°: 80 degrees   Internal rotation 90°: 60 degrees     Right Shoulder   Flexion: 160 degrees   Abduction: 150 degrees   External rotation 90°: 100 degrees   Internal rotation 90°: 40 degrees     Scapular Mobility   Left Shoulder   Scapular Mobility with Shoulder to 90° FF   Scapular winging: minimal    Scapular Mobility beyond 90° FF   Scapular winging: minimal    Joint Play   Left Shoulder  Hypomobile in the inferior capsule  Strength/Myotome Testing     Left Shoulder     Planes of Motion   Flexion: 4+   Abduction: 4   External rotation at 0°: 4   External rotation BTH: 4-   Internal rotation at 0°: 4+   Internal rotation at 90°: 4     Right Shoulder     Planes of Motion   Flexion: 4+   Abduction: 4+   External rotation at 0°: 4+   Internal rotation at 0°: 4+     Tests     Left Shoulder   Positive Hawkin's  Negative apprehension, drop arm, external rotation lag sign, Neer's and painful arc  Precautions:  PMHx: HTN, CKD II  Dx: R subacromial impingement, possible RTC tear    Manuals 9/27            Laser L subacromial space 5000J L shoulder PROM 3 min            L UT Graston 4 min            RS 90 deg FF  1 min                                                  Neuro Re-Ed             Standing B TB horizontal ABD YTB  3x15            TB rows Silver  3x30 HEP           TB ER 90 deg ABD YTB  3x15 RTB  3x10           pball chest passes against trampoline nv            C/s retraction 3x10 HEP           pball serratus press CW/CCW circles nv                         Ther Ex             Pulleys: FF, ABD 5"x15  ea 5"x10 ea           L UT stretch 10"x3            Seated t/s extension over chair 3"x15                                                                             Ther Activity                                       Gait Training                                       Modalities

## 2022-09-29 ENCOUNTER — OFFICE VISIT (OUTPATIENT)
Dept: PHYSICAL THERAPY | Facility: CLINIC | Age: 77
End: 2022-09-29
Payer: MEDICARE

## 2022-09-29 DIAGNOSIS — M25.512 ACUTE PAIN OF LEFT SHOULDER: Primary | ICD-10-CM

## 2022-09-29 PROCEDURE — 97112 NEUROMUSCULAR REEDUCATION: CPT | Performed by: PHYSICAL THERAPIST

## 2022-09-29 PROCEDURE — 97140 MANUAL THERAPY 1/> REGIONS: CPT | Performed by: PHYSICAL THERAPIST

## 2022-09-29 PROCEDURE — 97110 THERAPEUTIC EXERCISES: CPT | Performed by: PHYSICAL THERAPIST

## 2022-09-29 NOTE — PROGRESS NOTES
Daily Note     Today's date: 2022  Patient name: Maged Bolden  : 1945  MRN: [id number]  Referring provider: Jazmine Pemberton MD  Dx:   Encounter Diagnosis     ICD-10-CM    1  Acute pain of left shoulder  M25 512                   Subjective: Pt reports no pain, notes some achiness upon waking  Objective: See treatment diary below    Assessment: Pt demonstrated no pain, mod fatigue with progressions  Plan: Assess response nv  Precautions:  PMHx: HTN, CKD II  Dx: R subacromial impingement, possible RTC tear    Manuals            Laser L subacromial space 5000J 3000J           L shoulder PROM 3 min 3 min           L UT Graston 4 min 4 min           RS 90 deg FF  2x1 min                                                  Neuro Re-Ed             Standing B TB horizontal ABD YTB  3x15 YTB  3x20           TB rows Silver  3x30 HEP           TB ER 90 deg ABD YTB  3x15 RTB  3x10           pball chest passes against trampoline nv 1x20           C/s retraction 3x10 HEP           pball serratus press CW/CCW circles nv 3x10           90/90 ER plyo tosses against wall   2 2#                                                 Ther Ex             Pulleys: FF, ABD 5"x15  ea 5"x10 ea           L UT stretch 10"x3 HEP           Seated t/s extension over chair 3"x15 HEP                                                                            Ther Activity                                       Gait Training                                       Modalities

## 2022-10-04 ENCOUNTER — OFFICE VISIT (OUTPATIENT)
Dept: PHYSICAL THERAPY | Facility: CLINIC | Age: 77
End: 2022-10-04
Payer: MEDICARE

## 2022-10-04 DIAGNOSIS — M25.512 ACUTE PAIN OF LEFT SHOULDER: Primary | ICD-10-CM

## 2022-10-04 PROCEDURE — 97140 MANUAL THERAPY 1/> REGIONS: CPT

## 2022-10-04 PROCEDURE — 97112 NEUROMUSCULAR REEDUCATION: CPT

## 2022-10-04 PROCEDURE — 97110 THERAPEUTIC EXERCISES: CPT

## 2022-10-04 NOTE — PROGRESS NOTES
Daily Note     Today's date: 10/4/2022  Patient name: Terrie Altamirano  : 1945  MRN: [id number]  Referring provider: Neli Benedict MD  Dx:   Encounter Diagnosis     ICD-10-CM    1  Acute pain of left shoulder  M25 512        Start Time: 930  Stop Time: 1015  Total time in clinic (min): 45 minutes    Subjective: Patient states, "I have shoulder soreness but I've been using it more often"  Continued improvements  Objective: See treatment diary below    Assessment: Patient able to complete all TE with no complaints of pain  Verbal cueing required for shoulder positioning during TE  He demonstrated appropriate fatigue and took short rests between sets as advised  Plan: Assess response nv  Precautions:  PMHx: HTN, CKD II  Dx: R subacromial impingement, possible RTC tear    Manuals 9/27 9/29 10/4          Laser L subacromial space 5000J 3000J 3000J          L shoulder PROM 3 min 3 min 3 min          L UT [place] 4 min 4 min 4 min          RS 90 deg FF  2x1 min 2x1 min                                                 Neuro Re-Ed             Standing B TB horizontal ABD YTB  3x15 YTB  3x20 YTB  3x20          TB rows Silver  3x30 HEP           TB ER 90 deg ABD YTB  3x15 RTB  3x10 RTB  3x10          pball chest passes against trampoline nv 1x20 1x30          C/s retraction 3x10 HEP           pball serratus press CW/CCW circles nv 3x10 3x10 ea          90/90 ER plyo tosses against wall   2 2#  2x10                                                 Ther Ex             Pulleys: FF, ABD 5"x15  ea 5"x10 ea 5"x10 ea          L UT stretch 10"x3 HEP           Seated t/s extension over chair 3"x15 HEP                                                                            Ther Activity                                       Gait Training                                       Modalities

## 2022-10-06 ENCOUNTER — OFFICE VISIT (OUTPATIENT)
Dept: PHYSICAL THERAPY | Facility: CLINIC | Age: 77
End: 2022-10-06
Payer: MEDICARE

## 2022-10-06 DIAGNOSIS — M25.512 ACUTE PAIN OF LEFT SHOULDER: Primary | ICD-10-CM

## 2022-10-06 PROCEDURE — 97140 MANUAL THERAPY 1/> REGIONS: CPT

## 2022-10-06 PROCEDURE — 97112 NEUROMUSCULAR REEDUCATION: CPT

## 2022-10-06 NOTE — PROGRESS NOTES
Daily Note     Today's date: 10/6/2022  Patient name: Tiarra Fajardo  : 1945  MRN: [id number]  Referring provider: Diandra Stewrad MD  Dx:   Encounter Diagnosis     ICD-10-CM    1  Acute pain of left shoulder  M25 512                   Subjective: Patient reports occasional episodes of pain with moving his left arm overhead  Objective: See treatment diary below    Assessment: Patient demonstrated good SA activation with pball chest press and improved endurance with plyometric exercises  Plan: Assess response nv  Precautions:  PMHx: HTN, CKD II  Dx: R subacromial impingement, possible RTC tear    Manuals 9/27 9/29 10/4 10/6         Laser L subacromial space 5000J 3000J 3000J 3000J         L shoulder PROM 3 min 3 min 3 min 3 min         L UT Graston 4 min 4 min 4 min 4 min         RS 90 deg FF  2x1 min 2x1 min 2x1 min                                                Neuro Re-Ed             Standing B TB horizontal ABD YTB  3x15 YTB  3x20 YTB  3x20 YTB  3x20         TB rows Silver  3x30 HEP           TB ER 90 deg ABD YTB  3x15 RTB  3x10 RTB  3x10 RTB  3x10         pball chest passes against trampoline nv 1x20 1x30 1x30         C/s retraction 3x10 HEP           pball serratus press CW/CCW circles nv 3x10 3x10 ea 3x10 ea         90/90 ER plyo tosses against wall   2 2#  2x10 2 2#  3x10                                                Ther Ex             Pulleys: FF, ABD 5"x15  ea 5"x10 ea 5"x10 ea 5"x10 ea         L UT stretch 10"x3 HEP           Seated t/s extension over chair 3"x15 HEP                                                                            Ther Activity                                       Gait Training                                       Modalities

## 2022-10-11 ENCOUNTER — OFFICE VISIT (OUTPATIENT)
Dept: PHYSICAL THERAPY | Facility: CLINIC | Age: 77
End: 2022-10-11
Payer: MEDICARE

## 2022-10-11 DIAGNOSIS — M25.512 ACUTE PAIN OF LEFT SHOULDER: Primary | ICD-10-CM

## 2022-10-11 PROCEDURE — 97112 NEUROMUSCULAR REEDUCATION: CPT

## 2022-10-11 NOTE — PROGRESS NOTES
Daily Note     Today's date: 10/11/2022  Patient name: Cyrus Mark  : 1945  MRN: [id number]  Referring provider: Lilibeth Grant MD  Dx:   Encounter Diagnosis     ICD-10-CM    1  Acute pain of left shoulder  M25 512        Start Time: 1130  Stop Time: 1210  Total time in clinic (min): 40 minutes    Subjective: Patient reports minimal L shoulder soreness prior to therapy  Objective: See treatment diary below    Assessment: Patient demonstrated improved L shoulder PROM with no pain  Plan: Assess response nv  Precautions:  PMHx: HTN, CKD II  Dx: R subacromial impingement, possible RTC tear    Manuals 9/27 9/29 10/4 10/6 10/11        Laser L subacromial space 5000J 3000J 3000J 3000J 3000J        L shoulder PROM 3 min 3 min 3 min 3 min 3 min        L UT Graston 4 min 4 min 4 min 4 min 4 min        RS 90 deg FF  2x1 min 2x1 min 2x1 min 2x1 min                                               Neuro Re-Ed             Standing B TB horizontal ABD YTB  3x15 YTB  3x20 YTB  3x20 YTB  3x20 YTB  3x20        TB rows Silver  3x30 HEP           TB ER 90 deg ABD YTB  3x15 RTB  3x10 RTB  3x10 RTB  3x10 RTB  3x10        pball chest passes against trampoline nv 1x20 1x30 1x30 1x30        C/s retraction 3x10 HEP           pball serratus press CW/CCW circles nv 3x10 3x10 ea 3x10 ea 3x10 ea        90/90 ER plyo tosses against wall   2 2#  2x10 2 2#  3x10 2 2#  3x10                                               Ther Ex             Pulleys: FF, ABD 5"x15  ea 5"x10 ea 5"x10 ea 5"x10 ea 5"x10 ea        L UT stretch 10"x3 HEP           Seated t/s extension over chair 3"x15 HEP                                                                            Ther Activity                                       Gait Training                                       Modalities

## 2022-10-12 ENCOUNTER — APPOINTMENT (OUTPATIENT)
Dept: LAB | Facility: CLINIC | Age: 77
End: 2022-10-12
Payer: MEDICARE

## 2022-10-12 DIAGNOSIS — R73.03 PREDIABETES: ICD-10-CM

## 2022-10-12 DIAGNOSIS — E53.8 VITAMIN B12 DEFICIENCY: ICD-10-CM

## 2022-10-12 DIAGNOSIS — E78.1 ESSENTIAL HYPERTRIGLYCERIDEMIA: ICD-10-CM

## 2022-10-12 DIAGNOSIS — I10 ESSENTIAL HYPERTENSION: ICD-10-CM

## 2022-10-12 LAB
ALBUMIN SERPL BCP-MCNC: 4.6 G/DL (ref 3.5–5)
ALP SERPL-CCNC: 52 U/L (ref 34–104)
ALT SERPL W P-5'-P-CCNC: 17 U/L (ref 7–52)
ANION GAP SERPL CALCULATED.3IONS-SCNC: 5 MMOL/L (ref 4–13)
AST SERPL W P-5'-P-CCNC: 21 U/L (ref 13–39)
BASOPHILS # BLD AUTO: 0.04 THOUSANDS/ΜL (ref 0–0.1)
BASOPHILS NFR BLD AUTO: 1 % (ref 0–1)
BILIRUB SERPL-MCNC: 0.52 MG/DL (ref 0.2–1)
BUN SERPL-MCNC: 18 MG/DL (ref 5–25)
CALCIUM SERPL-MCNC: 9.9 MG/DL (ref 8.4–10.2)
CHLORIDE SERPL-SCNC: 102 MMOL/L (ref 96–108)
CHOLEST SERPL-MCNC: 170 MG/DL
CO2 SERPL-SCNC: 33 MMOL/L (ref 21–32)
CREAT SERPL-MCNC: 1 MG/DL (ref 0.6–1.3)
EOSINOPHIL # BLD AUTO: 0.31 THOUSAND/ΜL (ref 0–0.61)
EOSINOPHIL NFR BLD AUTO: 4 % (ref 0–6)
ERYTHROCYTE [DISTWIDTH] IN BLOOD BY AUTOMATED COUNT: 12.8 % (ref 11.6–15.1)
EST. AVERAGE GLUCOSE BLD GHB EST-MCNC: 131 MG/DL
GFR SERPL CREATININE-BSD FRML MDRD: 72 ML/MIN/1.73SQ M
GLUCOSE P FAST SERPL-MCNC: 100 MG/DL (ref 65–99)
HBA1C MFR BLD: 6.2 %
HCT VFR BLD AUTO: 46.6 % (ref 36.5–49.3)
HDLC SERPL-MCNC: 37 MG/DL
HGB BLD-MCNC: 15 G/DL (ref 12–17)
IMM GRANULOCYTES # BLD AUTO: 0.03 THOUSAND/UL (ref 0–0.2)
IMM GRANULOCYTES NFR BLD AUTO: 0 % (ref 0–2)
LDLC SERPL CALC-MCNC: 109 MG/DL (ref 0–100)
LYMPHOCYTES # BLD AUTO: 1.85 THOUSANDS/ΜL (ref 0.6–4.47)
LYMPHOCYTES NFR BLD AUTO: 26 % (ref 14–44)
MCH RBC QN AUTO: 31.1 PG (ref 26.8–34.3)
MCHC RBC AUTO-ENTMCNC: 32.2 G/DL (ref 31.4–37.4)
MCV RBC AUTO: 97 FL (ref 82–98)
MONOCYTES # BLD AUTO: 0.46 THOUSAND/ΜL (ref 0.17–1.22)
MONOCYTES NFR BLD AUTO: 7 % (ref 4–12)
NEUTROPHILS # BLD AUTO: 4.44 THOUSANDS/ΜL (ref 1.85–7.62)
NEUTS SEG NFR BLD AUTO: 62 % (ref 43–75)
NONHDLC SERPL-MCNC: 133 MG/DL
NRBC BLD AUTO-RTO: 0 /100 WBCS
PLATELET # BLD AUTO: 285 THOUSANDS/UL (ref 149–390)
PMV BLD AUTO: 11 FL (ref 8.9–12.7)
POTASSIUM SERPL-SCNC: 5 MMOL/L (ref 3.5–5.3)
PROT SERPL-MCNC: 7.4 G/DL (ref 6.4–8.4)
RBC # BLD AUTO: 4.83 MILLION/UL (ref 3.88–5.62)
SODIUM SERPL-SCNC: 140 MMOL/L (ref 135–147)
TRIGL SERPL-MCNC: 121 MG/DL
TSH SERPL DL<=0.05 MIU/L-ACNC: 2.95 UIU/ML (ref 0.45–4.5)
VIT B12 SERPL-MCNC: 367 PG/ML (ref 100–900)
WBC # BLD AUTO: 7.13 THOUSAND/UL (ref 4.31–10.16)

## 2022-10-12 PROCEDURE — 36415 COLL VENOUS BLD VENIPUNCTURE: CPT

## 2022-10-12 PROCEDURE — 85025 COMPLETE CBC W/AUTO DIFF WBC: CPT

## 2022-10-12 PROCEDURE — 83036 HEMOGLOBIN GLYCOSYLATED A1C: CPT

## 2022-10-12 PROCEDURE — 84443 ASSAY THYROID STIM HORMONE: CPT

## 2022-10-12 PROCEDURE — 82607 VITAMIN B-12: CPT

## 2022-10-12 PROCEDURE — 80061 LIPID PANEL: CPT

## 2022-10-12 PROCEDURE — 80053 COMPREHEN METABOLIC PANEL: CPT

## 2022-10-13 ENCOUNTER — OFFICE VISIT (OUTPATIENT)
Dept: PHYSICAL THERAPY | Facility: CLINIC | Age: 77
End: 2022-10-13
Payer: MEDICARE

## 2022-10-13 DIAGNOSIS — M25.512 ACUTE PAIN OF LEFT SHOULDER: Primary | ICD-10-CM

## 2022-10-13 PROCEDURE — 97140 MANUAL THERAPY 1/> REGIONS: CPT

## 2022-10-13 PROCEDURE — 97112 NEUROMUSCULAR REEDUCATION: CPT

## 2022-10-13 NOTE — PROGRESS NOTES
Daily Note     Today's date: 10/13/2022  Patient name: Jaden Handley  : 1945  MRN: [id number]  Referring provider: Ella Valdez MD  Dx:   Encounter Diagnosis     ICD-10-CM    1  Acute pain of left shoulder  M25 512        Start Time: 1000  Stop Time: 1040  Total time in clinic (min): 40 minutes    Subjective: Patient has no complaints of pain prior to therapy  Objective: See treatment diary below    Assessment: Patient demonstrated pain free PROM WFL  He is showing really good progress with therapy noting improved strength, AROM/PROM and function  Plan: Assess response nv  Precautions:  PMHx: HTN, CKD II  Dx: R subacromial impingement, possible RTC tear    Manuals 9/27 9/29 10/4 10/6 10/11 10/13       Laser L subacromial space 5000J 3000J 3000J 3000J 3000J 3000J       L shoulder PROM 3 min 3 min 3 min 3 min 3 min 3 min       L UT Graston 4 min 4 min 4 min 4 min 4 min 4 min       RS 90 deg FF  2x1 min 2x1 min 2x1 min 2x1 min 2x1 min                                              Neuro Re-Ed             Standing B TB horizontal ABD YTB  3x15 YTB  3x20 YTB  3x20 YTB  3x20 YTB  3x20 YTB  3x20       TB rows Silver  3x30 HEP           TB ER 90 deg ABD YTB  3x15 RTB  3x10 RTB  3x10 RTB  3x10 RTB  3x10 RTB  3x10       pball chest passes against trampoline nv 1x20 1x30 1x30 1x30 1x30       C/s retraction 3x10 HEP           pball serratus press CW/CCW circles nv 3x10 3x10 ea 3x10 ea 3x10 ea 3x10 ea       90/90 ER plyo tosses against wall   2 2#  2x10 2 2#  3x10 2 2#  3x10 2 2#  3x10                                              Ther Ex             Pulleys: FF, ABD 5"x15  ea 5"x10 ea 5"x10 ea 5"x10 ea 5"x10 ea 5"x10 ea       L UT stretch 10"x3 HEP           Seated t/s extension over chair 3"x15 HEP                                                                            Ther Activity                                       Gait Training                                       Modalities

## 2022-10-18 ENCOUNTER — OFFICE VISIT (OUTPATIENT)
Dept: INTERNAL MEDICINE CLINIC | Facility: CLINIC | Age: 77
End: 2022-10-18
Payer: MEDICARE

## 2022-10-18 ENCOUNTER — OFFICE VISIT (OUTPATIENT)
Dept: PHYSICAL THERAPY | Facility: CLINIC | Age: 77
End: 2022-10-18
Payer: MEDICARE

## 2022-10-18 VITALS
HEIGHT: 66 IN | WEIGHT: 225.8 LBS | BODY MASS INDEX: 36.29 KG/M2 | HEART RATE: 61 BPM | DIASTOLIC BLOOD PRESSURE: 84 MMHG | SYSTOLIC BLOOD PRESSURE: 128 MMHG | TEMPERATURE: 97.1 F | OXYGEN SATURATION: 93 %

## 2022-10-18 DIAGNOSIS — H81.319 AUDITORY VERTIGO, UNSPECIFIED LATERALITY: ICD-10-CM

## 2022-10-18 DIAGNOSIS — Z71.9 HEALTH COUNSELING: ICD-10-CM

## 2022-10-18 DIAGNOSIS — M25.512 ACUTE PAIN OF LEFT SHOULDER: Primary | ICD-10-CM

## 2022-10-18 DIAGNOSIS — K21.9 LARYNGOPHARYNGEAL REFLUX (LPR): ICD-10-CM

## 2022-10-18 DIAGNOSIS — N18.2 CHRONIC KIDNEY DISEASE (CKD), STAGE II (MILD): ICD-10-CM

## 2022-10-18 DIAGNOSIS — E66.09 CLASS 2 OBESITY DUE TO EXCESS CALORIES WITHOUT SERIOUS COMORBIDITY WITH BODY MASS INDEX (BMI) OF 37.0 TO 37.9 IN ADULT: ICD-10-CM

## 2022-10-18 DIAGNOSIS — R73.03 PREDIABETES: ICD-10-CM

## 2022-10-18 DIAGNOSIS — E78.2 MIXED HYPERLIPIDEMIA: ICD-10-CM

## 2022-10-18 DIAGNOSIS — E53.8 VITAMIN B12 DEFICIENCY: ICD-10-CM

## 2022-10-18 PROCEDURE — 97140 MANUAL THERAPY 1/> REGIONS: CPT | Performed by: PHYSICAL THERAPIST

## 2022-10-18 PROCEDURE — 97110 THERAPEUTIC EXERCISES: CPT | Performed by: PHYSICAL THERAPIST

## 2022-10-18 PROCEDURE — 99214 OFFICE O/P EST MOD 30 MIN: CPT | Performed by: INTERNAL MEDICINE

## 2022-10-18 PROCEDURE — 97112 NEUROMUSCULAR REEDUCATION: CPT | Performed by: PHYSICAL THERAPIST

## 2022-10-18 NOTE — PROGRESS NOTES
Daily Note     Today's date: 10/18/2022  Patient name: Irene Ho  : 1945  MRN: [id number]  Referring provider: Aleks Mendoza MD  Dx:   Encounter Diagnosis     ICD-10-CM    1  Acute pain of left shoulder  M25 512                   Subjective: Pt reports less intensity and frequent pain with FROM  Objective: See treatment diary below    Assessment: Pt demonstrated improved RTC strength in an elevated position  Plan: Cont  POC  Precautions:  PMHx: HTN, CKD II  Dx: R subacromial impingement, possible RTC tear    Manuals 9/27 9/29 10/4 10/6 10/11 10/13 10/18      Laser L subacromial space 5000J 3000J 3000J 3000J 3000J 3000J 3000J      L shoulder PROM 3 min 3 min 3 min 3 min 3 min 3 min 10"x3 ea      L UT Graston 4 min 4 min 4 min 4 min 4 min 4 min 4 min      RS 90 deg FF  2x1 min 2x1 min 2x1 min 2x1 min 2x1 min 2x1 min                                             Neuro Re-Ed             Standing B TB horizontal ABD YTB  3x15 YTB  3x20 YTB  3x20 YTB  3x20 YTB  3x20 YTB  3x20 R/  3x10      TB rows Silver  3x30 HEP           TB ER 90 deg ABD YTB  3x15 RTB  3x10 RTB  3x10 RTB  3x10 RTB  3x10 RTB  3x10 R/  3x12      pball chest passes against trampoline nv 1x20 1x30 1x30 1x30 1x30 1x30      C/s retraction 3x10 HEP           pball serratus press CW/CCW circles nv 3x10 3x10 ea 3x10 ea 3x10 ea 3x10 ea 2 2#  3x10      90/90 ER plyo tosses against wall   2 2#  2x10 2 2#  3x10 2 2#  3x10 2 2#  3x10 2 2#  3x10                                             Ther Ex             Pulleys: FF, ABD 5"x15  ea 5"x10 ea 5"x10 ea 5"x10 ea 5"x10 ea 5"x10 ea 5"x10 ea      L UT stretch 10"x3 HEP           Seated t/s extension over chair 3"x15 HEP                                                                            Ther Activity                                       Gait Training                                       Modalities

## 2022-10-18 NOTE — PROGRESS NOTES
Assessment/Plan:    Acute pain of left shoulder  Ongoing physical therapy  Chronic kidney disease (CKD), stage II (mild)  Lab Results   Component Value Date    EGFR 72 10/12/2022    EGFR 77 10/12/2021    EGFR 63 04/13/2021    CREATININE 1 00 10/12/2022    CREATININE 0 99 04/12/2022    CREATININE 0 95 10/12/2021     Stable  Class 2 obesity due to excess calories without serious comorbidity with body mass index (BMI) of 37 0 to 37 9 in adult  Lost 5 lbs since last visit  Essential hypertension  BP stable, on duretics  Mixed hyperlipidemia  Stable, on statin and gemfibrozil  Prediabetes  A1c worse at 6 2%  Reviewed diet  Vitamin B12 deficiency  B12 levels slightly improved  S/p injections monthly x 2 only  Continue daily supplement  Laryngopharyngeal reflux (LPR)  Hoarseness worse, taking omeprazole 20 mg daily  May take it bid or increase dose to 40 mg  Refer back to ENT  Auditory vertigo  Suspect recent symptoms due to vertigo  Start home exercises  Stay hydrated  Diagnoses and all orders for this visit:    Acute pain of left shoulder    Laryngopharyngeal reflux (LPR)    Mixed hyperlipidemia  -     Lipid panel; Future    Class 2 obesity due to excess calories without serious comorbidity with body mass index (BMI) of 37 0 to 37 9 in adult    Vitamin B12 deficiency  -     Vitamin B12; Future    Chronic kidney disease (CKD), stage II (mild)  -     Comprehensive metabolic panel; Future    Prediabetes  -     Hemoglobin A1C; Future    Auditory vertigo, unspecified laterality    Health counseling  Comments:  Flu vaccine later this month  Shingrix at the pharmacy  Follow up in 6 months or as needed  Subjective:      Patient ID: Carlos Waite is a 68 y o  male here for a follow up  He reports left shoulder pain has improved  He feels about 90% mobility now, still working on strength  He is still going to physical therapy      He notice that he is more hoarse recently, is affecting his singing voice  He denies any sore throat or trouble swallowing  He does have the occasional nasal congestion, denies any postnasal drip  He is taking his omeprazole once a day  He reports a few episodes of dizziness, feels he is unsteady on his feet  He reports no hearing loss or tinnitus, no syncopal episodes  He feels his balance was off  He sat down and drank some tomato juice in symptoms resolved on its own  This had occurred several times  Denies any chest pain, shortness breath, palpitations or other symptoms during these episodes  The following portions of the patient's history were reviewed and updated as appropriate: allergies, current medications, past medical history, past social history and problem list     Review of Systems   Constitutional: Negative for activity change, appetite change and fatigue  HENT: Positive for voice change  Negative for congestion, hearing loss, postnasal drip and trouble swallowing  Eyes: Negative  Respiratory: Negative for cough, chest tightness and shortness of breath  Cardiovascular: Negative for chest pain, palpitations and leg swelling  Gastrointestinal: Negative for abdominal pain and constipation  Genitourinary: Negative for dysuria and frequency  Musculoskeletal: Negative for arthralgias  Skin: Negative for rash and wound  Neurological: Negative for dizziness, numbness and headaches  Hematological: Negative for adenopathy  Does not bruise/bleed easily  Psychiatric/Behavioral: Negative for sleep disturbance  The patient is not nervous/anxious  Objective:      /84   Pulse 61   Temp (!) 97 1 °F (36 2 °C)   Ht 5' 6" (1 676 m)   Wt 102 kg (225 lb 12 8 oz)   SpO2 93%   BMI 36 45 kg/m²          Physical Exam  Vitals and nursing note reviewed  Constitutional:       Appearance: He is well-developed  HENT:      Head: Normocephalic and atraumatic     Eyes:      Conjunctiva/sclera: Conjunctivae normal  Pupils: Pupils are equal, round, and reactive to light  Cardiovascular:      Rate and Rhythm: Normal rate and regular rhythm  Heart sounds: Normal heart sounds  Pulmonary:      Effort: Pulmonary effort is normal       Breath sounds: No wheezing or rhonchi  Abdominal:      General: Bowel sounds are normal       Palpations: Abdomen is soft  Musculoskeletal:         General: No swelling  Right shoulder: Normal range of motion  Left shoulder: Normal range of motion  Cervical back: Neck supple  Right lower leg: No edema  Left lower leg: No edema  Skin:     General: Skin is warm  Findings: No rash  Neurological:      General: No focal deficit present  Mental Status: He is alert and oriented to person, place, and time  Cranial Nerves: Cranial nerves are intact  Sensory: No sensory deficit  Comments: Mild horizontal nystagmus L   Psychiatric:         Mood and Affect: Mood and affect normal          Behavior: Behavior normal            Labs & imaging results reviewed with patient

## 2022-10-18 NOTE — ASSESSMENT & PLAN NOTE
Lab Results   Component Value Date    EGFR 72 10/12/2022    EGFR 77 10/12/2021    EGFR 63 04/13/2021    CREATININE 1 00 10/12/2022    CREATININE 0 99 04/12/2022    CREATININE 0 95 10/12/2021     Stable

## 2022-10-18 NOTE — ASSESSMENT & PLAN NOTE
Hoarseness worse, taking omeprazole 20 mg daily  May take it bid or increase dose to 40 mg  Refer back to ENT

## 2022-11-01 ENCOUNTER — OFFICE VISIT (OUTPATIENT)
Dept: PHYSICAL THERAPY | Facility: CLINIC | Age: 77
End: 2022-11-01

## 2022-11-01 DIAGNOSIS — M25.512 ACUTE PAIN OF LEFT SHOULDER: Primary | ICD-10-CM

## 2022-11-01 NOTE — PROGRESS NOTES
Daily Note     Today's date: 2022  Patient name: Rodrigue Aviles  : 1945  MRN: [id number]  Referring provider: Justin Chandler MD  Dx:   Encounter Diagnosis     ICD-10-CM    1  Acute pain of left shoulder  M25 512        Start Time: 1000  Stop Time: 1040  Total time in clinic (min): 40 minutes    Subjective: Patient states, "My shoulder feels good considering I didn't complete my exercises all last week"  Patient notes occasional dull twinge at the lateral aspect of his shoulder  Objective: See treatment diary below    Assessment: Patient did maintain his progress despite not completing his HEP last week  Plan: Cont  POC  Precautions:  PMHx: HTN, CKD II  Dx: R subacromial impingement, possible RTC tear    Manuals 9/27 9/29 10/4 10/6 10/11 10/13 10/18 11/1     Laser L subacromial space 5000J 3000J 3000J 3000J 3000J 3000J 3000J 3000J     L shoulder PROM 3 min 3 min 3 min 3 min 3 min 3 min 10"x3 ea 10"x3 ea     L UT Graston 4 min 4 min 4 min 4 min 4 min 4 min 4 min 4 min     RS 90 deg FF  2x1 min 2x1 min 2x1 min 2x1 min 2x1 min 2x1 min 2x1 min                                            Neuro Re-Ed             Standing B TB horizontal ABD YTB  3x15 YTB  3x20 YTB  3x20 YTB  3x20 YTB  3x20 YTB  3x20 R/  3x10 R/  3x10     TB rows Silver  3x30 HEP           TB ER 90 deg ABD YTB  3x15 RTB  3x10 RTB  3x10 RTB  3x10 RTB  3x10 RTB  3x10 R/  3x12 R/  3x12     pball chest passes against trampoline nv 1x20 1x30 1x30 1x30 1x30 1x30 1x30     C/s retraction 3x10 HEP           pball serratus press CW/CCW circles nv 3x10 3x10 ea 3x10 ea 3x10 ea 3x10 ea 2 2#  3x10 2 2#  3x10     90/90 ER plyo tosses against wall   2 2#  2x10 2 2#  3x10 2 2#  3x10 2 2#  3x10 2 2#  3x10 2 2#  3x10                                            Ther Ex             Pulleys: FF, ABD 5"x15  ea 5"x10 ea 5"x10 ea 5"x10 ea 5"x10 ea 5"x10 ea 5"x10 ea 5"x10 ea     L UT stretch 10"x3 HEP           Seated t/s extension over chair 3"x15 HEP                                                                            Ther Activity                                       Gait Training                                       Modalities

## 2022-11-03 ENCOUNTER — OFFICE VISIT (OUTPATIENT)
Dept: PHYSICAL THERAPY | Facility: CLINIC | Age: 77
End: 2022-11-03

## 2022-11-03 DIAGNOSIS — M25.512 ACUTE PAIN OF LEFT SHOULDER: Primary | ICD-10-CM

## 2022-11-03 NOTE — PROGRESS NOTES
Daily Note     Today's date: 11/3/2022  Patient name: Carl Asencio  : 1945  MRN: [id number]  Referring provider: Andriy Peña MD  Dx:   Encounter Diagnosis     ICD-10-CM    1  Acute pain of left shoulder  M25 512                   Subjective: Pt reports no pain, mild weakness with OH reaching  He reports mild pain and difficulty reaching into the base of his neck  He has not yet attempted volleyball but has begun to work on the [employer] which requires a lot of repetitive reaching, pushing, and pulling sometimes on his hands and knees  Objective: See treatment diary below    Assessment: Pt demonstrated improved RTC strength but is not yet able to consistently avoid UT compensation during OH movements  Plan: Cont  POC  Precautions:  PMHx: HTN, CKD II  Dx: R subacromial impingement, possible RTC tear    Manuals 9/27 9/29 10/4 10/6 10/11 10/13 10/18 11/1 11/3    Laser L subacromial space 5000J 3000J 3000J 3000J 3000J 3000J 3000J 3000J 3000J    L shoulder PROM 3 min 3 min 3 min 3 min 3 min 3 min 10"x3 ea 10"x3 ea 10"x3 ea    L UT Graston 4 min 4 min 4 min 4 min 4 min 4 min 4 min 4 min  4min    RS 90 deg FF  2x1 min 2x1 min 2x1 min 2x1 min 2x1 min 2x1 min 2x1 min 2x1 min                                           Neuro Re-Ed             Standing B TB horizontal ABD YTB  3x15 YTB  3x20 YTB  3x20 YTB  3x20 YTB  3x20 YTB  3x20 R/  3x10 R/  3x10 R/  3x10 hold   TB rows Silver  3x30 HEP           TB ER 90 deg ABD YTB  3x15 RTB  3x10 RTB  3x10 RTB  3x10 RTB  3x10 RTB  3x10 R/  3x12 R/  3x12 R/  3x15    pball chest passes against trampoline nv 1x20 1x30 1x30 1x30 1x30 1x30 1x30     C/s retraction 3x10 HEP           pball serratus press CW/CCW circles nv 3x10 3x10 ea 3x10 ea 3x10 ea 3x10 ea 2 2#  3x10 2 2#  3x10 2 2#  3x10    90/90 ER plyo tosses against wall   2 2#  2x10 2 2#  3x10 2 2#  3x10 2 2#  3x10 2 2#  3x10 2 2#  3x10     TB FF wall slides         Y/  3x10    Prone T's             Prone Y's Ther Ex             Pulleys: FF, ABD 5"x15  ea 5"x10 ea 5"x10 ea 5"x10 ea 5"x10 ea 5"x10 ea 5"x10 ea 5"x10 ea 5"x10 ea    L UT stretch 10"x3 HEP           Seated t/s extension over chair 3"x15 HEP                                                                            Ther Activity                                       Gait Training                                       Modalities

## 2022-11-08 ENCOUNTER — OFFICE VISIT (OUTPATIENT)
Dept: PHYSICAL THERAPY | Facility: CLINIC | Age: 77
End: 2022-11-08

## 2022-11-08 DIAGNOSIS — M25.512 ACUTE PAIN OF LEFT SHOULDER: Primary | ICD-10-CM

## 2022-11-08 NOTE — PROGRESS NOTES
Daily Note     Today's date: 2022  Patient name: Manav Austin  : 1945  MRN: [id number]  Referring provider: Wallace Ruiz MD  Dx:   Encounter Diagnosis     ICD-10-CM    1  Acute pain of left shoulder  M25 512        Start Time: 1030  Stop Time: 1105  Total time in clinic (min): 35 minutes    Subjective: Patient reports L shoulder soreness over the weekend with unknown cause  However, it did decrease following stretching  Objective: See treatment diary below    Assessment: Patient still tends to have minor UT compensation with OH movements  L shoulder ROM WFL  Plan: Cont  POC  Precautions:  PMHx: HTN, CKD II  Dx: R subacromial impingement, possible RTC tear    Manuals 9/27 9/29 10/4 10/6 10/11 10/13 10/18 11/1 11/3 11/8   Laser L subacromial space 5000J 3000J 3000J 3000J 3000J 3000J 3000J 3000J 3000J 3000J   L shoulder PROM 3 min 3 min 3 min 3 min 3 min 3 min 10"x3 ea 10"x3 ea 10"x3 ea 10"x3 ea   L UT [place] 4 min 4 min 4 min 4 min 4 min 4 min 4 min 4 min  4min 4 min   RS 90 deg FF  2x1 min 2x1 min 2x1 min 2x1 min 2x1 min 2x1 min 2x1 min 2x1 min 2x1 min                                          Neuro Re-Ed             Standing B TB horizontal ABD YTB  3x15 YTB  3x20 YTB  3x20 YTB  3x20 YTB  3x20 YTB  3x20 R/  3x10 R/  3x10 R/  3x10 hold   TB rows Silver  3x30 HEP           TB ER 90 deg ABD YTB  3x15 RTB  3x10 RTB  3x10 RTB  3x10 RTB  3x10 RTB  3x10 R/  3x12 R/  3x12 R/  3x15 R  3x15   pball chest passes against trampoline nv 1x20 1x30 1x30 1x30 1x30 1x30 1x30     C/s retraction 3x10 HEP           pball serratus press CW/CCW circles nv 3x10 3x10 ea 3x10 ea 3x10 ea 3x10 ea 2 2#  3x10 2 2#  3x10 2 2#  3x10 2 2#  3x10   90/90 ER plyo tosses against wall   2 2#  2x10 2 2#  3x10 2 2#  3x10 2 2#  3x10 2 2#  3x10 2 2#  3x10     TB FF wall slides         Y/  3x10 Y/  3x10   Prone T's             Prone Y's             Ther Ex             Pulleys: FF, ABD 5"x15  ea 5"x10 ea 5"x10 ea 5"x10 ea 5"x10 ea 5"x10 ea 5"x10 ea 5"x10 ea 5"x10 ea 5"x10 ea   L UT stretch 10"x3 HEP           Seated t/s extension over chair 3"x15 HEP                                                                            Ther Activity                                       Gait Training                                       Modalities

## 2022-11-10 ENCOUNTER — OFFICE VISIT (OUTPATIENT)
Dept: PHYSICAL THERAPY | Facility: CLINIC | Age: 77
End: 2022-11-10

## 2022-11-10 DIAGNOSIS — M25.512 ACUTE PAIN OF LEFT SHOULDER: Primary | ICD-10-CM

## 2022-11-10 NOTE — PROGRESS NOTES
Daily Note     Today's date: 11/10/2022  Patient name: Lori Blount  : 1945  MRN: [id number]  Referring provider: Humaira Bruce MD  Dx:   Encounter Diagnosis     ICD-10-CM    1  Acute pain of left shoulder  M25 512                   Subjective: Patient reports 3/10 left posterior shoulder pain with lifting his arm out to the side  Pt reports using his arm more for a project at [employer]  Objective: See treatment diary below    Assessment: Patient demonstrated minimal UT compensation with TE program, moderate tone in L UT  Plan: Cont  POC  Precautions:  PMHx: HTN, CKD II  Dx: R subacromial impingement, possible RTC tear    Manuals 11/10      10/18 11/1 11/3 11/8   Laser L subacromial space 3000J      3000J 3000J 3000J 3000J   L shoulder PROM 10"x3 ea      10"x3 ea 10"x3 ea 10"x3 ea 10"x3 ea   L UT [place] 4 min      4 min 4 min  4min 4 min   RS 90 deg FF 2x1 min      2x1 min 2x1 min 2x1 min 2x1 min                                          Neuro Re-Ed             Standing B TB horizontal ABD       R/  3x10 R/  3x10 R/  3x10 hold   TB rows             TB ER 90 deg ABD R/ 3x15      R/  3x12 R/  3x12 R/  3x15 R  3x15   pball chest passes against trampoline       1x30 1x30     C/s retraction             pball serratus press CW/CCW circles 2 2#  3x10      2 2#  3x10 2 2#  3x10 2 2#  3x10 2 2#  3x10   90/90 ER plyo tosses against wall       2 2#  3x10 2 2#  3x10     TB FF wall slides Y/   3x10        Y/  3x10 Y/  3x10   Prone T's             Prone Y's             Ther Ex             Pulleys: FF, ABD 5"x10 ea      5"x10 ea 5"x10 ea 5"x10 ea 5"x10 ea   L UT stretch             Seated t/s extension over chair                                                                              Ther Activity                                       Gait Training                                       Modalities

## 2022-11-15 ENCOUNTER — EVALUATION (OUTPATIENT)
Dept: PHYSICAL THERAPY | Facility: CLINIC | Age: 77
End: 2022-11-15

## 2022-11-15 DIAGNOSIS — M25.512 ACUTE PAIN OF LEFT SHOULDER: Primary | ICD-10-CM

## 2022-11-15 NOTE — PROGRESS NOTES
PT Re-Evaluation     Today's date: 11/15/2022  Patient name: Chay Charles  : 1945  MRN: [id number]  Referring provider: Christi Feng MD  Dx:   Encounter Diagnosis     ICD-10-CM    1  Acute pain of left shoulder  M25 512                   Assessment  Assessment details: Pt demonstrates full PROM and improved RTC strength but continues to demonstrate impairments of scapular stability and GH arthrokinematics  His pain over the past week appears to be secondary to overuse with unusual OH and WB activities  He demonstrated a resolution of resting pain with emphasis on scapular stabilization and MT to decrease his symptoms  He will continue to benefit from skilled PT services to address his remaining impairments with further emphasis on scapular stabilization and HEP workload modification in order to further improve pain and function  Impairments: abnormal muscle firing, abnormal or restricted ROM, abnormal movement, activity intolerance, impaired physical strength, lacks appropriate home exercise program, pain with function, poor posture  and poor body mechanics  Understanding of Dx/Px/POC: good   Prognosis: good    Goals  STG - 4 wks  1) pt will demonstrate 120 deg active FE (met)  2) pt will improve pain 50% (met)    LTG - 8-12 wks  1) pt will demonstrate full AROM (met)  2) pt will improve pain 90% (not met)    Plan  Planned modality interventions: low level laser therapy  Planned therapy interventions: joint mobilization, manual therapy, body mechanics training, neuromuscular re-education, strengthening, stretching, patient education, postural training, therapeutic activities, therapeutic exercise, home exercise program, functional ROM exercises and flexibility  Frequency: 2x week  Duration in weeks: 6  Treatment plan discussed with: patient        Subjective Evaluation    History of Present Illness  Mechanism of injury: Pt is a 68 y o  male with PMHx significant for HTN, CKD II   He reports improved L shoulder since last RE until this past week when he began to have constant generalized soreness at baseline  He reports working on the [address] [place] [address] [employer] over the past 2 wks which requires crawling, prolonged hands-and-knees work, and repeated reaching and [address] work in unusual positions  Pain  Current pain ratin  At best pain ratin  At worst pain ratin  Location: anterior, lateral L shoulder  Quality: dull ache  Relieving factors: rest and heat  Aggravating factors: overhead activity and lifting  Progression: no change    Hand dominance: right      Diagnostic Tests  No diagnostic tests performed  Treatments  Previous treatment: physical therapy  Current treatment: physical therapy  Patient Goals  Patient goals for therapy: decreased pain, increased motion and return to sport/leisure activities          Objective     Postural Observations  Seated posture: poor  Standing posture: poor  Correction of posture: makes symptoms better        Active Range of Motion   Left Shoulder   Flexion: 150 degrees   Abduction: 140 degrees   External rotation BTH: C7   Internal rotation BTB: T12     Right Shoulder   Flexion: 150 degrees   Abduction: 150 degrees   External rotation BTH: C7   Internal rotation BTB: T12     Passive Range of Motion   Left Shoulder   Flexion: 150 degrees   Abduction: 150 degrees   External rotation 90°: 80 degrees   Internal rotation 90°: 60 degrees     Right Shoulder   Flexion: 160 degrees   Abduction: 150 degrees   External rotation 90°: 100 degrees   Internal rotation 90°: 40 degrees     Scapular Mobility   Left Shoulder   Scapular Mobility with Shoulder to 90° FF   Scapular winging: minimal  Scapular elevation: minimal    Scapular Mobility beyond 90° FF   Scapular winging: minimal    Joint Play   Left Shoulder  Hypomobile in the inferior capsule      Strength/Myotome Testing     Left Shoulder     Planes of Motion   Flexion: 4   Abduction: 4+   External rotation at 0°: 4+ (improved with postural correction)   External rotation at 90°: 4   Internal rotation at 0°: 4+   Internal rotation at 90°: 4     Right Shoulder     Planes of Motion   Flexion: 4+   Abduction: 4+   External rotation at 0°: 4+   Internal rotation at 0°: 4+     Tests     Left Shoulder   Positive Hawkin's and painful arc  Negative apprehension, drop arm, external rotation lag sign and Neer's  Precautions:  PMHx: HTN, CKD II  Dx: R subacromial impingement, possible RTC tear    Manuals  11/15           Laser L subacromial space  6000J           L shoulder PROM  10"x5 ea           L UT Graston  4 min                                                               Neuro Re-Ed             Prone I's  2x10 3x10          Prone T's  2x10 3x10          B/L FF wall slides  3x15 Y/  3x10                                    pball serratus press CW/CCW circles  2 2#  2x10 2 2#  3x10                       Ther Ex             Pulleys: FF, ABD  5"x10 ea           TB ER 90/90  R/  3x10 R/  3x15                                                                                        Ther Activity                                       Gait Training                                       Modalities

## 2022-11-17 ENCOUNTER — OFFICE VISIT (OUTPATIENT)
Dept: PHYSICAL THERAPY | Facility: CLINIC | Age: 77
End: 2022-11-17

## 2022-11-17 DIAGNOSIS — M25.512 ACUTE PAIN OF LEFT SHOULDER: Primary | ICD-10-CM

## 2022-11-17 NOTE — PROGRESS NOTES
Daily Note     Today's date: 2022  Patient name: Kendrick Batista  : 1945  MRN: [id number]  Referring provider: Vishal Francis MD  Dx:   Encounter Diagnosis     ICD-10-CM    1  Acute pain of left shoulder  M25 512                      Subjective: Pt reports less soreness at rest since last visit  Objective: See treatment diary below    Assessment: Pt demonstrated a good response to laser and scap stab  Plan: Cont  POC  Precautions:  PMHx: HTN, CKD II  Dx: R subacromial impingement, possible RTC tear    Manuals  11/15 11/17          Laser L subacromial space  6000J 6000J          L shoulder PROM  10"x5 ea 10"x5 ea          L UT Graston  4 min 4 min                                                              Neuro Re-Ed             Prone I's  2x10 3x10          Prone T's  2x10 3x10          B/L FF wall slides  3x15 3x15                                    pball serratus press CW/CCW circles  2 2#  2x10 2 2#  3x10                       Ther Ex             Pulleys: FF, ABD  5"x10 ea 5"x10 ea          TB ER 90/90  R/  3x10 R/  3x10                                                                                        Ther Activity                                       Gait Training                                       Modalities

## 2022-11-29 ENCOUNTER — CLINICAL SUPPORT (OUTPATIENT)
Dept: INTERNAL MEDICINE CLINIC | Facility: CLINIC | Age: 77
End: 2022-11-29

## 2022-11-29 DIAGNOSIS — Z23 NEED FOR INFLUENZA VACCINATION: Primary | ICD-10-CM

## 2022-12-01 ENCOUNTER — OFFICE VISIT (OUTPATIENT)
Dept: PHYSICAL THERAPY | Facility: CLINIC | Age: 77
End: 2022-12-01

## 2022-12-01 DIAGNOSIS — M25.512 ACUTE PAIN OF LEFT SHOULDER: Primary | ICD-10-CM

## 2022-12-01 NOTE — PROGRESS NOTES
Daily Note     Today's date: 2022  Patient name: Ly Chou  : 1945  MRN: [id number]  Referring provider: Taryn Long MD  Dx:   Encounter Diagnosis     ICD-10-CM    1  Acute pain of left shoulder  M25 512                      Subjective: Pt reports significantly improved pain, stiffness, and OH activity tolerance  Objective: See treatment diary below    Assessment: Pt demonstrated improved scap stab and good tolerance to progressions  Plan: Cont  POC  Precautions:  PMHx: HTN, CKD II  Dx: R subacromial impingement, possible RTC tear    Manuals  11/15 11/17 12/1         Laser L subacromial space  6000J 6000J 3000J         L shoulder PROM  10"x5 ea 10"x5 ea 10"x5 ea         L UT Graston  4 min 4 min 4 min                                                             Neuro Re-Ed             Prone I's  2x10 3x10 3x15         Prone T's  2x10 3x10 3x15         B/L FF wall slides  3x15 3x15 3x10                                   pball serratus press CW/CCW circles  2 2#  2x10 2 2#  3x10 2 2#  3x15                      Ther Ex             Pulleys: FF, ABD  5"x10 ea 5"x10 ea 5"x10 ea         TB ER 90/90  R/  3x10 R/  3x10 R/  3x12                                                                                       Ther Activity                                       Gait Training                                       Modalities

## 2022-12-06 ENCOUNTER — OFFICE VISIT (OUTPATIENT)
Dept: PHYSICAL THERAPY | Facility: CLINIC | Age: 77
End: 2022-12-06

## 2022-12-06 DIAGNOSIS — M25.512 ACUTE PAIN OF LEFT SHOULDER: Primary | ICD-10-CM

## 2022-12-06 NOTE — PROGRESS NOTES
Daily Note     Today's date: 2022  Patient name: Sunitha Calderon  : 1945  MRN: [id number]  Referring provider: Nieves Nix MD  Dx:   Encounter Diagnosis     ICD-10-CM    1  Acute pain of left shoulder  M25 512                      Subjective: Pt reports 1 episode of L shoulder pain reaching back and up into a low cabinet  Objective: See treatment diary below    Assessment: Pt demonstrated improved scap stab  Plan: Cont  POC  Precautions:  PMHx: HTN, CKD II  Dx: R subacromial impingement, possible RTC tear    Manuals  11/15 11/17 12/1 12/6        Laser L subacromial space  6000J 6000J 3000J 4000J        L shoulder PROM  10"x5 ea 10"x5 ea 10"x5 ea 10"x5 ea        L UT Gr[place]  4 min 4 min 4 min 4 min                                                            Neuro Re-Ed             Prone I's  2x10 3x10 3x15 3x15 3x20       Prone T's  2x10 3x10 3x15 3x15 3x15       B/L FF wall slides  3x15 3x15 3x10 3x15 3x15                                 pball serratus press CW/CCW circles  2 2#  2x10 2 2#  3x10 2 2#  3x15 nv                     Ther Ex             Pulleys: FF, ABD  5"x10 ea 5"x10 ea 5"x10 ea 5"x10 ea        TB ER 90/90  R/  3x10 R/  3x10 R/  3x12 R/  3x12                                                                                      Ther Activity                                       Gait Training                                       Modalities

## 2022-12-07 DIAGNOSIS — K21.9 GASTROESOPHAGEAL REFLUX DISEASE WITHOUT ESOPHAGITIS: ICD-10-CM

## 2022-12-07 DIAGNOSIS — K21.9 LARYNGOPHARYNGEAL REFLUX (LPR): ICD-10-CM

## 2022-12-07 RX ORDER — OMEPRAZOLE 40 MG/1
40 CAPSULE, DELAYED RELEASE ORAL DAILY
Qty: 90 CAPSULE | Refills: 1 | Status: SHIPPED | OUTPATIENT
Start: 2022-12-07

## 2022-12-08 ENCOUNTER — OFFICE VISIT (OUTPATIENT)
Dept: PHYSICAL THERAPY | Facility: CLINIC | Age: 77
End: 2022-12-08

## 2022-12-08 DIAGNOSIS — M25.512 ACUTE PAIN OF LEFT SHOULDER: Primary | ICD-10-CM

## 2022-12-08 NOTE — PROGRESS NOTES
Daily Note     Today's date: 2022  Patient name: Tiarra Fajardo  : 1945  MRN: [id number]  Referring provider: Diandra Steward MD  Dx:   Encounter Diagnosis     ICD-10-CM    1  Acute pain of left shoulder  M25 512                      Subjective: Pt reports 4/10 achy anterior shoulder pain for the past 2 days after doing a lot of reaching in weird positions  Objective: See treatment diary below    Assessment: Pt demonstrated increased scapular endurance but limited tolerance to reaching activities  Pt demonstrated decreased pain levels post tx  Plan: Cont  POC  Precautions:  PMHx: HTN, CKD II  Dx: R subacromial impingement, possible RTC tear    Manuals  11/15 11/17 12/1 12/6 12/8       Laser L subacromial space  6000J 6000J 3000J 4000J 4000J       L shoulder PROM  10"x5 ea 10"x5 ea 10"x5 ea 10"x5 ea 10"x5 ea       L UT Graston  4 min 4 min 4 min 4 min 4 min                                                           Neuro Re-Ed             Prone I's  2x10 3x10 3x15 3x15 3x20       Prone T's  2x10 3x10 3x15 3x15 3x15       B/L FF wall slides  3x15 3x15 3x10 3x15 3x15                                 pball serratus press CW/CCW circles  2 2#  2x10 2 2#  3x10 2 2#  3x15 nv 2 2# 3x15                    Ther Ex             Pulleys: FF, ABD  5"x10 ea 5"x10 ea 5"x10 ea 5"x10 ea 5"x10 ea       TB ER 90/90  R/  3x10 R/  3x10 R/  3x12 R/  3x12 R/  3x12                                                                                     Ther Activity                                       Gait Training                                       Modalities

## 2022-12-13 ENCOUNTER — OFFICE VISIT (OUTPATIENT)
Dept: PHYSICAL THERAPY | Facility: CLINIC | Age: 77
End: 2022-12-13

## 2022-12-13 DIAGNOSIS — M25.512 ACUTE PAIN OF LEFT SHOULDER: Primary | ICD-10-CM

## 2022-12-13 NOTE — PROGRESS NOTES
Daily Note     Today's date: 2022  Patient name: Irene Ho  : 1945  MRN: [id number]  Referring provider: Aleks Mendoza MD  Dx:   Encounter Diagnosis     ICD-10-CM    1  Acute pain of left shoulder  M25 512                      Subjective: Pt reports posterior L shoulder ache  Objective: See treatment diary below    Assessment: Pt demonstrated a resolution of pain following TE and improved mid trap and RTC strength  Plan: Cont  POC  Precautions:  PMHx: HTN, CKD II  Dx: R subacromial impingement, possible RTC tear    Manuals  11/15 11/17 12/1 12/6 12/8 12/13      Laser L subacromial space  6000J 6000J 3000J 4000J 4000J 4000J      L shoulder PROM  10"x5 ea 10"x5 ea 10"x5 ea 10"x5 ea 10"x5 ea 10"x5 ea      L UT Graston  4 min 4 min 4 min 4 min 4 min 4 min                                                          Neuro Re-Ed             Prone I's  2x10 3x10 3x15 3x15 3x20 3x20 1#  3x15     Prone T's  2x10 3x10 3x15 3x15 3x15 3x15 3x20     B/L FF wall slides  3x15 3x15 3x10 3x15 3x15 3x15                                pball serratus press CW/CCW circles  2 2#  2x10 2 2#  3x10 2 2#  3x15 nv 2 2# 3x15 2 2#  3x15                   Ther Ex             Pulleys: FF, ABD  5"x10 ea 5"x10 ea 5"x10 ea 5"x10 ea 5"x10 ea 5"x10 ea      TB ER 90/90  R/  3x10 R/  3x10 R/  3x12 R/  3x12 R/  3x12 R/  3x15                                                                                    Ther Activity                                       Gait Training                                       Modalities

## 2022-12-15 ENCOUNTER — OFFICE VISIT (OUTPATIENT)
Dept: PHYSICAL THERAPY | Facility: CLINIC | Age: 77
End: 2022-12-15

## 2022-12-15 DIAGNOSIS — M25.512 ACUTE PAIN OF LEFT SHOULDER: Primary | ICD-10-CM

## 2022-12-15 NOTE — PROGRESS NOTES
Daily Note     Today's date: 12/15/2022  Patient name: Marly Campbell  : 1945  MRN: [id number]  Referring provider: Arnulfo Birch MD  Dx:   Encounter Diagnosis     ICD-10-CM    1  Acute pain of left shoulder  M25 512                      Subjective: Pt reports intermittent low levels of right shoulder pain  Objective: See treatment diary below    Assessment: Pt demonstrated increased tolerance to scapular strengthening and near full pain-free PROM  Plan: Cont  POC  Precautions:  PMHx: HTN, CKD II  Dx: R subacromial impingement, possible RTC tear    Manuals  11/15 11/17 12/1 12/6 12/8 12/13 12/15     Laser L subacromial space  6000J 6000J 3000J 4000J 4000J 4000J 4000J     L shoulder PROM  10"x5 ea 10"x5 ea 10"x5 ea 10"x5 ea 10"x5 ea 10"x5 ea 10"x5 ea     L UT Graston  4 min 4 min 4 min 4 min 4 min 4 min 4 min                                                         Neuro Re-Ed             Prone I's  2x10 3x10 3x15 3x15 3x20 3x20 1#  3x15     Prone T's  2x10 3x10 3x15 3x15 3x15 3x15 3x20     B/L FF wall slides  3x15 3x15 3x10 3x15 3x15 3x15 3x15                               pball serratus press CW/CCW circles  2 2#  2x10 2 2#  3x10 2 2#  3x15 nv 2 2# 3x15 2 2#  3x15 2 2#  3x15                  Ther Ex             Pulleys: FF, ABD  5"x10 ea 5"x10 ea 5"x10 ea 5"x10 ea 5"x10 ea 5"x10 ea 5"x10 ea     TB ER 90/90  R/  3x10 R/  3x10 R/  3x12 R/  3x12 R/  3x12 R/  3x15 R/   3x15                                                                                   Ther Activity                                       Gait Training                                       Modalities

## 2022-12-18 DIAGNOSIS — E78.2 MIXED HYPERLIPIDEMIA: ICD-10-CM

## 2022-12-18 DIAGNOSIS — H81.319 AUDITORY VERTIGO, UNSPECIFIED LATERALITY: ICD-10-CM

## 2022-12-19 RX ORDER — GEMFIBROZIL 600 MG/1
TABLET, FILM COATED ORAL
Qty: 180 TABLET | Refills: 1 | Status: SHIPPED | OUTPATIENT
Start: 2022-12-19

## 2022-12-19 RX ORDER — TRIAMTERENE AND HYDROCHLOROTHIAZIDE 37.5; 25 MG/1; MG/1
CAPSULE ORAL
Qty: 90 CAPSULE | Refills: 1 | Status: SHIPPED | OUTPATIENT
Start: 2022-12-19

## 2022-12-20 ENCOUNTER — OFFICE VISIT (OUTPATIENT)
Dept: PHYSICAL THERAPY | Facility: CLINIC | Age: 77
End: 2022-12-20

## 2022-12-20 DIAGNOSIS — M25.512 ACUTE PAIN OF LEFT SHOULDER: Primary | ICD-10-CM

## 2022-12-20 NOTE — PROGRESS NOTES
Daily Note     Today's date: 2022  Patient name: Trinity Wallace  : 1945  MRN: [id number]  Referring provider: Kirsten Anderson MD  Dx:   Encounter Diagnosis     ICD-10-CM    1  Acute pain of left shoulder  M25 512           Start Time: 1015  Stop Time: 1055  Total time in clinic (min): 40 minutes    Subjective: Pt reports he has been feeling really good and hasn't really had much pain  Objective: See treatment diary below      Assessment: Pt did well with form during prone interventions and had no discomfort  IR PROM is still slightly limited and he reported a slight pinch at end range that was abolished with slight GH distraction  Cueing needed to decrease shoulder shrugging and increase scapular depression during wall slides  Patient demonstrated fatigue post treatment, exhibited good technique with therapeutic exercises and would benefit from continued PT      Plan: Continue per plan of care  Precautions:  PMHx: HTN, CKD II  Dx: R subacromial impingement, possible RTC tear    Manuals  11/15 11/17 12/1 12/6 12/8 12/13 12/15 12/20    Laser L subacromial space  6000J 6000J 3000J 4000J 4000J 4000J 4000J 4000J    L shoulder PROM  10"x5 ea 10"x5 ea 10"x5 ea 10"x5 ea 10"x5 ea 10"x5 ea 10"x5 ea 10"x5 ea    L [place]  4 min 4 min 4 min 4 min 4 min 4 min 4 min 4 min                                                        Neuro Re-Ed             Prone I's  2x10 3x10 3x15 3x15 3x20 3x20 1#  3x15 1# 3x15    Prone T's  2x10 3x10 3x15 3x15 3x15 3x15 3x20 3x20    B/L FF wall slides  3x15 3x15 3x10 3x15 3x15 3x15 3x15 3x15                              pball serratus press CW/CCW circles  2 2#  2x10 2 2#  3x10 2 2#  3x15 nv 2 2# 3x15 2 2#  3x15 2 2#  3x15 2 2#  3x15                 Ther Ex             Pulleys: FF, ABD  5"x10 ea 5"x10 ea 5"x10 ea 5"x10 ea 5"x10 ea 5"x10 ea 5"x10 ea 5" x10 ea    TB ER 90/90  R/  3x10 R/  3x10 R/  3x12 R/  3x12 R/  3x12 R/  3x15 R/   3x15 R 3x15 Ther Activity                                       Gait Training                                       Modalities

## 2022-12-22 ENCOUNTER — APPOINTMENT (OUTPATIENT)
Dept: PHYSICAL THERAPY | Facility: CLINIC | Age: 77
End: 2022-12-22

## 2022-12-29 ENCOUNTER — APPOINTMENT (OUTPATIENT)
Dept: PHYSICAL THERAPY | Facility: CLINIC | Age: 77
End: 2022-12-29

## 2023-01-03 ENCOUNTER — EVALUATION (OUTPATIENT)
Dept: PHYSICAL THERAPY | Facility: CLINIC | Age: 78
End: 2023-01-03

## 2023-01-03 DIAGNOSIS — M25.512 ACUTE PAIN OF LEFT SHOULDER: Primary | ICD-10-CM

## 2023-01-03 NOTE — PROGRESS NOTES
PT Re-Evaluation     Today's date: 1/3/2023  Patient name: Rebeka Mathews  : 1945  MRN: [id number]  Referring provider: Jim Gary MD  Dx:   Encounter Diagnosis     ICD-10-CM    1  Acute pain of left shoulder  M25 512                      Assessment  Assessment details: Pt demonstrates improved AROM, arthrokinematics, strength, and pain  He will continue to benefit from skilled PT services to address her impairments in order to further improve pain, function, and recreation  Impairments: abnormal muscle firing, abnormal or restricted ROM, abnormal movement, activity intolerance, impaired physical strength, lacks appropriate home exercise program, pain with function, poor posture  and poor body mechanics  Understanding of Dx/Px/POC: good   Prognosis: good    Goals  STG - 4 wks  1) pt will demonstrate 120 deg active FE (met)  2) pt will improve pain 50% (met)    LTG - 8-12 wks  1) pt will demonstrate full AROM (met)  2) pt will improve pain 90% (not met)    Plan  Planned modality interventions: low level laser therapy  Planned therapy interventions: joint mobilization, manual therapy, body mechanics training, neuromuscular re-education, strengthening, stretching, patient education, postural training, therapeutic activities, therapeutic exercise, home exercise program, functional ROM exercises and flexibility  Frequency: 2x week  Duration in weeks: 4  Treatment plan discussed with: patient        Subjective Evaluation    History of Present Illness  Mechanism of injury: Pt is a 68 y o  male with PMHx significant for HTN, CKD II  He reports 2-3 times/week brief, sharp L shoulder pain if he reaches the wrong way but with a resolution of pain or soreness at rest and with most ADLs    Pain  Current pain ratin  At best pain ratin  At worst pain ratin  Location: anterior, lateral L shoulder  Quality: dull ache  Relieving factors: rest and heat  Aggravating factors: overhead activity and lifting  Progression: no change    Hand dominance: right      Diagnostic Tests  No diagnostic tests performed  Treatments  Previous treatment: physical therapy  Current treatment: physical therapy  Patient Goals  Patient goals for therapy: decreased pain, increased motion and return to sport/leisure activities          Objective     Postural Observations  Seated posture: poor  Standing posture: poor  Correction of posture: makes symptoms better        Active Range of Motion   Left Shoulder   Flexion: 150 degrees   Abduction: 150 degrees   External rotation BTH: C7   Internal rotation BTB: T11     Right Shoulder   Flexion: 150 degrees   Abduction: 150 degrees   External rotation BTH: C7   Internal rotation BTB: T12     Passive Range of Motion   Left Shoulder   Flexion: 155 degrees   Abduction: 155 degrees   External rotation 90°: 80 degrees   Internal rotation 90°: 60 degrees     Right Shoulder   Flexion: 160 degrees   Abduction: 150 degrees   External rotation 90°: 100 degrees   Internal rotation 90°: 40 degrees     Scapular Mobility   Left Shoulder     Scapular Mobility beyond 90° FF   Scapular winging: minimal    Joint Play   Left Shoulder  Hypomobile in the inferior capsule  Strength/Myotome Testing     Left Shoulder     Planes of Motion   Flexion: 4+   Abduction: 4+   External rotation at 0°: 4 (improved with postural correction)   External rotation at 90°: 4   Internal rotation at 0°: 4+   Internal rotation at 90°: 4     Isolated Muscles   Lower trapezius: 4-   Middle trapezius: 3+     Right Shoulder     Planes of Motion   Flexion: 4+   Abduction: 4+   External rotation at 0°: 4+   Internal rotation at 0°: 4+     Tests     Left Shoulder   Positive Hawkin's  Negative apprehension, drop arm, external rotation lag sign, Neer's and painful arc  Precautions:  PMHx: HTN, CKD II  Dx: R subacromial impingement, possible RTC tear    Manuals 1/3/23            Laser L subacromial space 4000J            L shoulder PROM 5 min            L [place] 4 min                                                                Neuro Re-Ed             Prone I's             Prone T's 1#  3x20            B/L FF wall slides 3x15 YTB  3x10                                     pball serratus press CW/CCW circles 4 4#  3x10                         Ther Ex             Pulleys: FF, ABD 5"x10 ea            TB ER 90/90 RTB  3x15 GTB  3x10                                                                                         Ther Activity                                       Gait Training                                       Modalities

## 2023-01-05 ENCOUNTER — OFFICE VISIT (OUTPATIENT)
Dept: PHYSICAL THERAPY | Facility: CLINIC | Age: 78
End: 2023-01-05

## 2023-01-05 DIAGNOSIS — M25.512 ACUTE PAIN OF LEFT SHOULDER: Primary | ICD-10-CM

## 2023-01-05 NOTE — PROGRESS NOTES
Daily Note     Today's date: 2023  Patient name: Nick Kim  : 1945  MRN: [id number]  Referring provider: Demetri Orlando MD  Dx:   Encounter Diagnosis     ICD-10-CM    1  Acute pain of left shoulder  M25 512                      Subjective: Pt reports no pain, moderate fatigue after playing volleyball Tuesday  Objective: See treatment diary below    Assessment: Pt demonstrated no pain with strengthening  Plan: Cont  POC  Precautions:  PMHx: HTN, CKD II  Dx: R subacromial impingement, possible RTC tear    Manuals 1/3/23 1/5           Laser L subacromial space 4000J 4000J           L shoulder PROM 5 min 5 min           L [place] 4 min 3 min                                                               Neuro Re-Ed             Prone I's             Prone T's 1#  3x20 1#  3x20           B/L FF wall slides 3x15 3x15 YTB  3x10                                    pball serratus press CW/CCW circles 4 4#  3x10 4 4#  3x10 4 4#  3x15                       Ther Ex             Pulleys: FF, ABD 5"x10 ea 5"x10 ea           TB ER 90/90 RTB  3x15 GTB  3x10                                                                                         Ther Activity                                       Gait Training                                       Modalities

## 2023-01-10 ENCOUNTER — OFFICE VISIT (OUTPATIENT)
Dept: PHYSICAL THERAPY | Facility: CLINIC | Age: 78
End: 2023-01-10

## 2023-01-10 DIAGNOSIS — M25.512 ACUTE PAIN OF LEFT SHOULDER: Primary | ICD-10-CM

## 2023-01-10 NOTE — PROGRESS NOTES
Daily Note     Today's date: 1/10/2023  Patient name: Eleni Hooper  : 1945  MRN: [id number]  Referring provider: Obie Solo MD  Dx:   Encounter Diagnosis     ICD-10-CM    1  Acute pain of left shoulder  M25 512           Start Time: 1015  Stop Time: 1055  Total time in clinic (min): 40 minutes    Subjective: Pt reports 1 episode of pain lifting a heavy weight OH on the L with a pronated   Objective: See treatment diary below  Educated pt on how  position affects [facility] position during [place] lifting/reaching    Assessment: Pt demonstrated fatigue, no pain with strengthening progressions  Plan: Cont  POC  Precautions:  PMHx: HTN, CKD II  Dx: R subacromial impingement, possible RTC tear    Manuals 1/3/23 1/5 1/10          Laser L subacromial space 4000J 4000J 4000J          L shoulder PROM 5 min 5 min 5 min          L [place] 4 min 3 min 3 min          Gr IV inf GH mobs   1x60                                                 Neuro Re-Ed             Prone I's             Prone T's 1#  3x20 1#  3x20 1#  3x20          B/L FF wall slides 3x15 3x15 YTB  3x10                                    pball serratus press CW/CCW circles 4 4#  3x10 4 4#  3x10 4 4#  3x15                       Ther Ex             Pulleys: FF, ABD 5"x10 ea 5"x10 ea 5"x10 ea          TB ER 90/90 RTB  3x15 GTB  3x10 G/  3x10                                                                                        Ther Activity                                       Gait Training                                       Modalities

## 2023-01-12 ENCOUNTER — OFFICE VISIT (OUTPATIENT)
Dept: PHYSICAL THERAPY | Facility: CLINIC | Age: 78
End: 2023-01-12

## 2023-01-12 DIAGNOSIS — M25.512 ACUTE PAIN OF LEFT SHOULDER: Primary | ICD-10-CM

## 2023-01-12 NOTE — PROGRESS NOTES
Daily Note     Today's date: 2023  Patient name: Terrie Altamirano  : 1945  MRN: [id number]  Referring provider: Neli Benedict MD  Dx:   Encounter Diagnosis     ICD-10-CM    1  Acute pain of left shoulder  M25 512                      Subjective: Pt reports increased L shoulder stiffness after hitting OH at volleyball 2 nights ago  Objective: See treatment diary below    Assessment: Pt required inferior GH glide during ABD PROM to abolish painful arc passively and actively  Plan: Cont  POC  Precautions:  PMHx: HTN, CKD II  Dx: R subacromial impingement, possible RTC tear    Manuals 1/3/23 1/5 1/10 1/12         Laser L subacromial space 4000J 4000J 4000J 5000J         L shoulder PROM 5 min 5 min 5 min 7 min         L [place] 4 min 3 min 3 min 3 min         Gr IV inf GH mobs   1x60 1x100                                                Neuro Re-Ed             Prone I's             Prone T's 1#  3x20 1#  3x20 1#  3x20 1#  3x20         B/L FF wall slides 3x15 3x15 YTB  3x10 YTB  3x10                                   pball serratus press CW/CCW circles 4 4#  3x10 4 4#  3x10 4 4#  3x15 4 4#  3x15                      Ther Ex             Pulleys: FF, ABD 5"x10 ea 5"x10 ea 5"x10 ea 5"x10 ea         TB ER 90/90 RTB  3x15 GTB  3x10 G/  3x10 G/  3x10 G/  3x15                                                                                      Ther Activity                                       Gait Training                                       Modalities

## 2023-01-17 ENCOUNTER — OFFICE VISIT (OUTPATIENT)
Dept: PHYSICAL THERAPY | Facility: CLINIC | Age: 78
End: 2023-01-17

## 2023-01-17 DIAGNOSIS — M25.512 ACUTE PAIN OF LEFT SHOULDER: Primary | ICD-10-CM

## 2023-01-17 NOTE — PROGRESS NOTES
Daily Note     Today's date: 2023  Patient name: Mireille Abraham  : 1945  MRN: [id number]  Referring provider: Paul Lacy MD  Dx:   Encounter Diagnosis     ICD-10-CM    1  Acute pain of left shoulder  M25 512                      Subjective: Pt reports no pain x 2 days following last treatment  Objective: See treatment diary below    Assessment: Pt demonstrated less pain with passive ABD and slowly improving strength  Plan: Cont  POC  Precautions:  PMHx: HTN, CKD II  Dx: R subacromial impingement, possible RTC tear    Manuals 1/3/23 1/5 1/10 1/12 1/17        Laser L subacromial space 4000J 4000J 4000J 5000J 4000J        L shoulder PROM 5 min 5 min 5 min 7 min 5 min        L [place] 4 min 3 min 3 min 3 min         Gr IV inf GH mobs   1x60 1x100 1x100                                               Neuro Re-Ed             Prone I's             Prone T's 1#  3x20 1#  3x20 1#  3x20 1#  3x20 1#  3x20 2#  3x10       B/L FF wall slides 3x15 3x15 YTB  3x10 YTB  3x10 YTB  3x10                                  pball serratus press CW/CCW circles 4 4#  3x10 4 4#  3x10 4 4#  3x15 4 4#  3x15 4 4#  3x15                     Ther Ex             Pulleys: FF, ABD 5"x10 ea 5"x10 ea 5"x10 ea 5"x10 ea 5"x10 ea        TB ER 90/90 RTB  3x15 GTB  3x10 G/  3x10 G/  3x10 G/  3x15                                                                                      Ther Activity                                       Gait Training                                       Modalities

## 2023-01-19 ENCOUNTER — OFFICE VISIT (OUTPATIENT)
Dept: PHYSICAL THERAPY | Facility: CLINIC | Age: 78
End: 2023-01-19

## 2023-01-19 DIAGNOSIS — M25.512 ACUTE PAIN OF LEFT SHOULDER: Primary | ICD-10-CM

## 2023-01-19 NOTE — PROGRESS NOTES
Daily Note     Today's date: 2023  Patient name: Alfredo Klein  : 1945  MRN: [id number]  Referring provider: Aaliyah Uriostegui MD  Dx:   Encounter Diagnosis     ICD-10-CM    1  Acute pain of left shoulder  M25 512                      Subjective: Patient notes some discomfort after a busy week, has returned to volleyball  Objective: See treatment diary below    Assessment: Patient with good tolerance overall, able to keep good form with current program  Challenged with weight progression on prone T's  Plan: Cont  POC  Precautions:  PMHx: HTN, CKD II  Dx: R subacromial impingement, possible RTC tear    Manuals 1/3/23 1/5 1/10 1/12 1/17 1/19       Laser L subacromial space 4000J 4000J 4000J 5000J 4000J LH       L shoulder PROM 5 min 5 min 5 min 7 min 5 min LH       L [place] 4 min 3 min 3 min 3 min         Gr IV inf GH mobs   1x60 1x100 1x100 LH                                              Neuro Re-Ed             Prone I's             Prone T's 1#  3x20 1#  3x20 1#  3x20 1#  3x20 1#  3x20 2#  3x10       B/L FF wall slides 3x15 3x15 YTB  3x10 YTB  3x10 YTB  3x10 YTB 3x10                                 pball serratus press CW/CCW circles 4 4#  3x10 4 4#  3x10 4 4#  3x15 4 4#  3x15 4 4#  3x15 4 4# 3x15                    Ther Ex             Pulleys: FF, ABD 5"x10 ea 5"x10 ea 5"x10 ea 5"x10 ea 5"x10 ea 10x5" ea       TB ER 90/90 RTB  3x15 GTB  3x10 G/  3x10 G/  3x10 G/  3x15 G/ 3x10                                                                                     Ther Activity                                       Gait Training                                       Modalities

## 2023-01-24 ENCOUNTER — OFFICE VISIT (OUTPATIENT)
Dept: PHYSICAL THERAPY | Facility: CLINIC | Age: 78
End: 2023-01-24

## 2023-01-24 DIAGNOSIS — M25.512 ACUTE PAIN OF LEFT SHOULDER: Primary | ICD-10-CM

## 2023-01-24 NOTE — PROGRESS NOTES
Daily Note     Today's date: 2023  Patient name: Delford Bloch  : 1945  MRN: [id number]  Referring provider: Modesto Mcgowan MD  Dx:   Encounter Diagnosis     ICD-10-CM    1  Acute pain of left shoulder  M25 512                      Subjective: Pt reports waking up with moderate achiness after reaching awkwardly and with some force across his body yesterday  Objective: See treatment diary below    Assessment: Pt demonstrated a resolution of pain after stretches and joint mobs to improve posterior capsule and axial distraction mobility  Plan: Assess response nv  Precautions:  PMHx: HTN, CKD II  Dx: R subacromial impingement, possible RTC tear    Manuals 1/3/23 1/5 1/10 1/12 1/17 1/19 1/24      Laser L subacromial space 4000J 4000J 4000J 5000J 4000J LH 5000J      L shoulder PROM 5 min 5 min 5 min 7 min 5 min LH       L [place] 4 min 3 min 3 min 3 min         Gr IV inf, post GH mobs   1x60 1x100 1x100 LH 1x100 ea      Gr IV lat distraction GH mobs       1x100                                Neuro Re-Ed             Prone I's             Prone T's 1#  3x20 1#  3x20 1#  3x20 1#  3x20 1#  3x20 2#  3x10 1#  3x15      B/L FF wall slides 3x15 3x15 YTB  3x10 YTB  3x10 YTB  3x10 YTB 3x10 No TB  3x10                                pball serratus press CW/CCW circles 4 4#  3x10 4 4#  3x10 4 4#  3x15 4 4#  3x15 4 4#  3x15 4 4# 3x15 nv                   Ther Ex             Pulleys: FF, ABD 5"x10 ea 5"x10 ea 5"x10 ea 5"x10 ea 5"x10 ea 10x5" ea 5"x10 ea      TB ER 90/90 RTB  3x15 GTB  3x10 G/  3x10 G/  3x10 G/  3x15 G/ 3x10 G/  3x10 G/  3x15     Standing L shoulder distraction/ADD post capsule stretch       5"x5                                                                       Ther Activity                                       Gait Training                                       Modalities

## 2023-01-26 ENCOUNTER — OFFICE VISIT (OUTPATIENT)
Dept: PHYSICAL THERAPY | Facility: CLINIC | Age: 78
End: 2023-01-26

## 2023-01-26 DIAGNOSIS — M25.512 ACUTE PAIN OF LEFT SHOULDER: Primary | ICD-10-CM

## 2023-01-26 NOTE — PROGRESS NOTES
Daily Note     Today's date: 2023  Patient name: Lauren Doyle  : 1945  MRN: [id number]  Referring provider: Dragan Barker MD  Dx:   Encounter Diagnosis     ICD-10-CM    1  Acute pain of left shoulder  M25 512                      Subjective: Pt reports significantly improved pain and stiffness since last visit  Objective: See treatment diary below    Assessment: Pt demonstrated improved irritability and stiffness  Plan: Cont  POC  Precautions:  PMHx: HTN, CKD II  Dx: R subacromial impingement, possible RTC tear    Manuals 1/3/23 1/5 1/10 1/12 1/17 1/19 1/24 1/26     Laser L subacromial space 4000J 4000J 4000J 5000J 4000J LH 5000J 5000J     L shoulder PROM 5 min 5 min 5 min 7 min 5 min LH  4 min     L [place] 4 min 3 min 3 min 3 min         Gr IV inf, post GH mobs   1x60 1x100 1x100 LH 1x100 ea 1x100 ea     Gr IV lat distraction GH mobs       1x100 1x100                               Neuro Re-Ed             Prone I's             Prone T's 1#  3x20 1#  3x20 1#  3x20 1#  3x20 1#  3x20 2#  3x10 1#  3x15 2#  3x15     B/L FF wall slides 3x15 3x15 YTB  3x10 YTB  3x10 YTB  3x10 YTB 3x10 No TB  3x10 No TB  3x10                               pball serratus press CW/CCW circles 4 4#  3x10 4 4#  3x10 4 4#  3x15 4 4#  3x15 4 4#  3x15 4 4# 3x15 nv nv                  Ther Ex             Pulleys: FF, ABD 5"x10 ea 5"x10 ea 5"x10 ea 5"x10 ea 5"x10 ea 10x5" ea 5"x10 ea 5"x10 ea     TB ER 90/90 RTB  3x15 GTB  3x10 G/  3x10 G/  3x10 G/  3x15 G/ 3x10 G/  3x10 G/  3x12     Standing L shoulder distraction/ADD post capsule stretch       5"x5 5"x5                                                                      Ther Activity                                       Gait Training                                       Modalities

## 2023-01-31 ENCOUNTER — APPOINTMENT (OUTPATIENT)
Dept: PHYSICAL THERAPY | Facility: CLINIC | Age: 78
End: 2023-01-31

## 2023-02-02 ENCOUNTER — OFFICE VISIT (OUTPATIENT)
Dept: PHYSICAL THERAPY | Facility: CLINIC | Age: 78
End: 2023-02-02

## 2023-02-02 DIAGNOSIS — M25.512 ACUTE PAIN OF LEFT SHOULDER: Primary | ICD-10-CM

## 2023-02-02 NOTE — PROGRESS NOTES
Daily Note     Today's date: 2023  Patient name: Delford Bloch  : 1945  MRN: [id number]  Referring provider: Modesto Mcgowan MD  Dx:   Encounter Diagnosis     ICD-10-CM    1  Acute pain of left shoulder  M25 512                      Subjective: Pt reports no pain, some stiffness  Objective: See treatment diary below    Assessment: Pt demonstrated improved strength and pain  Plan: Cont  POC  Precautions:  PMHx: HTN, CKD II  Dx: R subacromial impingement, possible RTC tear    Manuals 1/3/23 1/5 1/10 1/12 1/17 1/19 1/24 1/26 2/2    Laser L subacromial space 4000J 4000J 4000J 5000J 4000J LH 5000J 5000J 4000J    L shoulder PROM 5 min 5 min 5 min 7 min 5 min LH  4 min 4 min    L [place] 4 min 3 min 3 min 3 min         Gr IV inf, post GH mobs   1x60 1x100 1x100 LH 1x100 ea 1x100 ea 1x100 ea    Gr IV lat distraction GH mobs       1x100 1x100 1x100                              Neuro Re-Ed             Prone I's             Prone T's 1#  3x20 1#  3x20 1#  3x20 1#  3x20 1#  3x20 2#  3x10 1#  3x15 2#  3x15 2#  3x15 3#  3x10   B/L FF wall slides 3x15 3x15 YTB  3x10 YTB  3x10 YTB  3x10 YTB 3x10 No TB  3x10 No TB  3x10 No TB  3x15                              pball serratus press CW/CCW circles 4 4#  3x10 4 4#  3x10 4 4#  3x15 4 4#  3x15 4 4#  3x15 4 4# 3x15 nv nv                  Ther Ex             Pulleys: FF, ABD 5"x10 ea 5"x10 ea 5"x10 ea 5"x10 ea 5"x10 ea 10x5" ea 5"x10 ea 5"x10 ea 5"x10 ea    TB ER 90/90 RTB  3x15 GTB  3x10 G/  3x10 G/  3x10 G/  3x15 G/ 3x10 G/  3x10 G/  3x12 G/  3x15    Standing L shoulder distraction/ADD post capsule stretch       5"x5 5"x5 5"x10                                                                     Ther Activity                                       Gait Training                                       Modalities

## 2023-02-09 ENCOUNTER — OFFICE VISIT (OUTPATIENT)
Dept: PHYSICAL THERAPY | Facility: CLINIC | Age: 78
End: 2023-02-09

## 2023-02-09 DIAGNOSIS — M25.512 ACUTE PAIN OF LEFT SHOULDER: Primary | ICD-10-CM

## 2023-02-09 NOTE — PROGRESS NOTES
Daily Note     Today's date: 2023  Patient name: Chun Anderson  : 1945  MRN: [id number]  Referring provider: Javier Toro MD  Dx:   Encounter Diagnosis     ICD-10-CM    1  Acute pain of left shoulder  M25 512                      Subjective: Pt reports no pain at rest and significantly improved intensity, duration, and frequency of pain with reaching in weird directions  Objective: See treatment diary below    Assessment: Pt demonstrated improved ER/IR ROM and mid trap strength  Plan: Cont  POC  Precautions:  PMHx: HTN, CKD II  Dx: R subacromial impingement, possible RTC tear    Manuals 1/3/23 1/5 1/10 1/12 1/17 1/19 1/24 1/26 2/2 2/9   Laser L subacromial space 4000J 4000J 4000J 5000J 4000J LH 5000J 5000J 4000J 4000J   L shoulder PROM 5 min 5 min 5 min 7 min 5 min LH  4 min 4 min 3 min   L [place] 4 min 3 min 3 min 3 min         Gr IV inf, post GH mobs   1x60 1x100 1x100 LH 1x100 ea 1x100 ea 1x100 ea 1x100 ea   Gr IV lat distraction GH mobs       1x100 1x100 1x100 1x100                             Neuro Re-Ed             Prone I's             Prone T's 1#  3x20 1#  3x20 1#  3x20 1#  3x20 1#  3x20 2#  3x10 1#  3x15 2#  3x15 2#  3x15 3#  3x10   B/L FF wall slides 3x15 3x15 YTB  3x10 YTB  3x10 YTB  3x10 YTB 3x10 No TB  3x10 No TB  3x10 No TB  3x15 No TB  Palms on mirror  3x15                             pball serratus press CW/CCW circles 4 4#  3x10 4 4#  3x10 4 4#  3x15 4 4#  3x15 4 4#  3x15 4 4# 3x15    nv                Ther Ex             Pulleys: FF, ABD 5"x10 ea 5"x10 ea 5"x10 ea 5"x10 ea 5"x10 ea 10x5" ea 5"x10 ea 5"x10 ea 5"x10 ea 5"x10 ea   TB ER 90/90 RTB  3x15 GTB  3x10 G/  3x10 G/  3x10 G/  3x15 G/ 3x10 G/  3x10 G/  3x12 G/  3x15 G/  3x15   Standing L shoulder distraction/ADD post capsule stretch       5"x5 5"x5 5"x10 5"x10                                                                    Ther Activity                                       Gait Training Modalities

## 2023-02-14 ENCOUNTER — EVALUATION (OUTPATIENT)
Dept: PHYSICAL THERAPY | Facility: CLINIC | Age: 78
End: 2023-02-14

## 2023-02-14 DIAGNOSIS — M25.512 ACUTE PAIN OF LEFT SHOULDER: Primary | ICD-10-CM

## 2023-02-14 NOTE — PROGRESS NOTES
PT Re-Evaluation     Today's date: 2023  Patient name: Alfredo Klein  : 1945  MRN: [id number]  Referring provider: Aaliyah Uriostegui MD  Dx:   Encounter Diagnosis     ICD-10-CM    1  Acute pain of left shoulder  M25 512                      Assessment  Assessment details: Pt demonstrates improved active FF ROM, scapular stability, and pain  He will continue to benefit from skilled PT services to address his impairments in order to further improve pain and function  Impairments: abnormal muscle firing, abnormal or restricted ROM, abnormal movement, activity intolerance, impaired physical strength, lacks appropriate home exercise program, pain with function, poor posture  and poor body mechanics  Understanding of Dx/Px/POC: good   Prognosis: good    Goals  STG - 4 wks  1) pt will demonstrate 120 deg active FE (met)  2) pt will improve pain 50% (met)    LTG - 8-12 wks  1) pt will demonstrate full AROM (met)  2) pt will improve pain 90% (not met)    Plan  Planned modality interventions: low level laser therapy  Planned therapy interventions: joint mobilization, manual therapy, body mechanics training, neuromuscular re-education, strengthening, stretching, patient education, postural training, therapeutic activities, therapeutic exercise, home exercise program, functional ROM exercises and flexibility  Frequency: 2x week  Duration in weeks: 4  Treatment plan discussed with: patient        Subjective Evaluation    History of Present Illness  Mechanism of injury: Pt is a 68 y o  male with PMHx significant for HTN, CKD II  He reports only occasional pain if her reaches the wrong way and notes improved ADL performance without pain    Pain  Current pain ratin  At best pain ratin  At worst pain ratin  Location: anterior, lateral L shoulder  Quality: dull ache  Relieving factors: rest and heat  Aggravating factors: overhead activity and lifting  Progression: no change    Hand dominance: right      Diagnostic Tests  No diagnostic tests performed  Treatments  Previous treatment: physical therapy  Current treatment: physical therapy  Patient Goals  Patient goals for therapy: decreased pain, increased motion and return to sport/leisure activities          Objective     Postural Observations  Seated posture: poor  Standing posture: poor  Correction of posture: makes symptoms better        Active Range of Motion   Left Shoulder   Flexion: 155 degrees   Abduction: 150 degrees   External rotation BTH: C7   Internal rotation BTB: T11     Right Shoulder   Flexion: 150 degrees   Abduction: 150 degrees   External rotation BTH: C7   Internal rotation BTB: T12     Passive Range of Motion   Left Shoulder   Flexion: 155 degrees   Abduction: 155 degrees   External rotation 90°: 80 degrees   Internal rotation 90°: 60 degrees     Right Shoulder   Flexion: 160 degrees   Abduction: 150 degrees   External rotation 90°: 100 degrees   Internal rotation 90°: 40 degrees     Joint Play   Left Shoulder  Hypomobile in the inferior capsule  Strength/Myotome Testing     Left Shoulder     Planes of Motion   Flexion: 4+   Abduction: 4+   External rotation at 0°: 4 (improved with postural correction)   External rotation at 90°: 4   Internal rotation at 0°: 4+   Internal rotation at 90°: 4+     Isolated Muscles   Lower trapezius: 4-   Middle trapezius: 4     Right Shoulder     Planes of Motion   Flexion: 4+   Abduction: 4+   External rotation at 0°: 4+   Internal rotation at 0°: 4+     Tests     Left Shoulder   Positive Hawkin's  Negative apprehension, drop arm, external rotation lag sign, Neer's and painful arc  Precautions:  PMHx: HTN, CKD II  Dx: R subacromial impingement, possible RTC tear    Manuals 2/14            Laser L subacromial space 4000J            L shoulder PROM 4 min            Gr IV inf, post, lat mobs 1x100 ea                                                                Neuro Re-Ed Prone I's             Prone T's 3#  3x10 3#  3x12           B/L FF wall slides: palms flat 3x15                                                                Ther Ex             Pulleys: FF, ABD 5"x10 ea            TB ER 90/90 G/  4x12            L shoulder distraction/ADD stretch 5"x5                                                                             Ther Activity                                       Gait Training                                       Modalities

## 2023-02-16 ENCOUNTER — OFFICE VISIT (OUTPATIENT)
Dept: PHYSICAL THERAPY | Facility: CLINIC | Age: 78
End: 2023-02-16

## 2023-02-16 DIAGNOSIS — M25.512 ACUTE PAIN OF LEFT SHOULDER: Primary | ICD-10-CM

## 2023-02-16 NOTE — PROGRESS NOTES
Daily Note     Today's date: 2023  Patient name: Delford Bloch  : 1945  MRN: [id number]  Referring provider: Modesto Mcgowan MD  Dx:   Encounter Diagnosis     ICD-10-CM    1  Acute pain of left shoulder  M25 512                      Subjective: Pt reports L shoulder soreness since playing volleyball Tuesday night after strengthening progressions during PT earlier that day  Objective: See treatment diary below  Held tx intensity constant per soreness rules    Assessment: Pt demonstrated fatigue, no pain with TE  Plan:Cont  POC  Progress once soreness resolves  Precautions:  PMHx: HTN, CKD II  Dx: R subacromial impingement, possible RTC tear    Manuals            Laser L subacromial space 4000J 4000J           L shoulder PROM 4 min 4 min           Gr IV inf, post, lat mobs 1x100 ea 1x100 ea                                                               Neuro Re-Ed             Prone I's             Prone T's 3#  3x10 3#  3x10           B/L FF wall slides: palms flat 3x15 3x15                                                               Ther Ex             Pulleys: FF, ABD 5"x10 ea 5"x10 ea           TB ER 90/90 G/  4x12 G/  4x12           L shoulder distraction/ADD stretch 5"x5 5"x5                                                                            Ther Activity                                       Gait Training                                       Modalities

## 2023-02-21 ENCOUNTER — OFFICE VISIT (OUTPATIENT)
Dept: PHYSICAL THERAPY | Facility: CLINIC | Age: 78
End: 2023-02-21

## 2023-02-21 DIAGNOSIS — M25.512 ACUTE PAIN OF LEFT SHOULDER: Primary | ICD-10-CM

## 2023-02-21 NOTE — PROGRESS NOTES
Daily Note     Today's date: 2023  Patient name: Jose Martin Carolina  : 1945  MRN: [id number]  Referring provider: Marcel Baldwin MD  Dx:   Encounter Diagnosis     ICD-10-CM    1  Acute pain of left shoulder  M25 512           Start Time: 1400  Stop Time: 1430  Total time in clinic (min): 30 minutes    Subjective: Patient reports soreness prior and no soreness after therapy  Objective: See treatment diary below    Assessment: Patient demonstrates good form t/o therapy with little cueing required  Patient is responding well to therapy overall  Progress as able  Plan:Cont  POC  Progress once soreness resolves  Precautions:  PMHx: HTN, CKD II  Dx: R subacromial impingement, possible RTC tear    Manuals           Laser L subacromial space 4000J 4000J 4000J          L shoulder PROM 4 min 4 min 4 min          Gr IV inf, post, lat mobs 1x100 ea 1x100 ea                                                               Neuro Re-Ed             Prone I's             Prone T's 3#  3x10 3#  3x10 3#  3x10          B/L FF wall slides: palms flat 3x15 3x15 3x15                                                              Ther Ex             Pulleys: FF, ABD 5"x10 ea 5"x10 ea 5"x10 ea          TB ER 90/90 G/  4x12 G/  4x12 GTB  4x12          L shoulder distraction/ADD stretch 5"x5 5"x5 5"x5                                                                           Ther Activity                                       Gait Training                                       Modalities

## 2023-02-23 ENCOUNTER — OFFICE VISIT (OUTPATIENT)
Dept: PHYSICAL THERAPY | Facility: CLINIC | Age: 78
End: 2023-02-23

## 2023-02-23 DIAGNOSIS — M25.512 ACUTE PAIN OF LEFT SHOULDER: Primary | ICD-10-CM

## 2023-02-23 NOTE — PROGRESS NOTES
Daily Note     Today's date: 2023  Patient name: Orville Ramos  : 1945  MRN: [id number]  Referring provider: Theresia Holstein, MD  Dx:   Encounter Diagnosis     ICD-10-CM    1  Acute pain of left shoulder  M25 512                      Subjective: Pt reports improved stiffness and soreness  Objective: See treatment diary below    Assessment: Pt demonstrated a resolution of subjective stiffness following Tx  Plan:Cont  POC  Progress TB ER to blue/3x10 nv  Precautions:  PMHx: HTN, CKD II  Dx: R subacromial impingement, possible RTC tear    Manuals          Laser L subacromial space 4000J 4000J 4000J 4000J         L shoulder PROM 4 min 4 min 4 min 4 min         Gr IV inf, post, lat mobs 1x100 ea 1x100 ea  1x100 ea                                                             Neuro Re-Ed             Prone I's             Prone T's 3#  3x10 3#  3x10 3#  3x10 3#  3x10         B/L FF wall slides: palms flat 3x15 3x15 3x15 3x15                                                             Ther Ex             Pulleys: FF, ABD 5"x10 ea 5"x10 ea 5"x10 ea 5"x10 ea         TB ER 90/90 G/  4x12 G/  4x12 GTB  4x12 GTB  4x12         L shoulder distraction/ADD stretch 5"x5 5"x5 5"x5 5"x10                                                                          Ther Activity                                       Gait Training                                       Modalities

## 2023-02-28 ENCOUNTER — OFFICE VISIT (OUTPATIENT)
Dept: PHYSICAL THERAPY | Facility: CLINIC | Age: 78
End: 2023-02-28

## 2023-02-28 DIAGNOSIS — M25.512 ACUTE PAIN OF LEFT SHOULDER: Primary | ICD-10-CM

## 2023-02-28 NOTE — PROGRESS NOTES
Daily Note     Today's date: 2023  Patient name: Rick Casillas  : 1945  MRN: [id number]  Referring provider: Glenis Stark MD  Dx:   Encounter Diagnosis     ICD-10-CM    1  Acute pain of left shoulder  M25 512           Start Time: 1000  Stop Time: 1035  Total time in clinic (min): 35 minutes    Subjective: Patient reports minor soreness prior to therapy  Objective: See treatment diary below    Assessment: Patient did well with TB progression this visit  Good form demonstrated t/o therapy with resolution of soreness following therapy  Plan: Cont  POC  Precautions:  PMHx: HTN, CKD II  Dx: R subacromial impingement, possible RTC tear    Manuals         Laser L subacromial space 4000J 4000J 4000J 4000J 4000J        L shoulder PROM 4 min 4 min 4 min 4 min 4 min        Gr IV inf, post, lat mobs 1x100 ea 1x100 ea  1x100 ea                                                             Neuro Re-Ed             Prone I's             Prone T's 3#  3x10 3#  3x10 3#  3x10 3#  3x10 3#  3x10        B/L FF wall slides: palms flat 3x15 3x15 3x15 3x15 3x15                                                            Ther Ex             Pulleys: FF, ABD 5"x10 ea 5"x10 ea 5"x10 ea 5"x10 ea 5"x10 ea        TB ER 90/90 G/  4x12 G/  4x12 GTB  4x12 GTB  4x12 Blue  3x10        L shoulder distraction/ADD stretch 5"x5 5"x5 5"x5 5"x10 5"x10                                                                         Ther Activity                                       Gait Training                                       Modalities

## 2023-03-02 ENCOUNTER — OFFICE VISIT (OUTPATIENT)
Dept: PHYSICAL THERAPY | Facility: CLINIC | Age: 78
End: 2023-03-02

## 2023-03-02 DIAGNOSIS — M25.512 ACUTE PAIN OF LEFT SHOULDER: Primary | ICD-10-CM

## 2023-03-02 NOTE — PROGRESS NOTES
Daily Note     Today's date: 3/2/2023  Patient name: Erica Saldivar  : 1945  MRN: [id number]  Referring provider: Chris Keita MD  Dx:   Encounter Diagnosis     ICD-10-CM    1  Acute pain of left shoulder  M25 512           Start Time: 1000  Stop Time: 1035  Total time in clinic (min): 35 minutes    Subjective: Patient states, "I was stowing away my car seats to make room for loading it  I was using my R arm to pull the chairs out and I pulled a too hard and irritated my R shoulder"  Patient has no complaints of L shoulder pain  Objective: See treatment diary below    Assessment: Patient able to complete all TE listed below with L shoulder only per subjective comments  Plan: Cont  POC  Precautions:  PMHx: HTN, CKD II  Dx: R subacromial impingement, possible RTC tear    Manuals 2/14 2/16 2/21 2/23 2/28 3/2       Laser L subacromial space 4000J 4000J 4000J 4000J 4000J 4000J       L shoulder PROM 4 min 4 min 4 min 4 min 4 min 4 min       Gr IV inf, post, lat mobs 1x100 ea 1x100 ea  1x100 ea                                                             Neuro Re-Ed             Prone I's             Prone T's 3#  3x10 3#  3x10 3#  3x10 3#  3x10 3#  3x10 3#  3x10       B/L FF wall slides: palms flat 3x15 3x15 3x15 3x15 3x15 nv                                                           Ther Ex             Pulleys: FF, ABD 5"x10 ea 5"x10 ea 5"x10 ea 5"x10 ea 5"x10 ea 5"x10 ea       TB ER 90/90 G/  4x12 G/  4x12 GTB  4x12 GTB  4x12 Blue  3x10 Blue  3x10       L shoulder distraction/ADD stretch 5"x5 5"x5 5"x5 5"x10 5"x10 5"x10                                                                        Ther Activity                                       Gait Training                                       Modalities

## 2023-03-07 ENCOUNTER — OFFICE VISIT (OUTPATIENT)
Dept: PHYSICAL THERAPY | Facility: CLINIC | Age: 78
End: 2023-03-07

## 2023-03-07 ENCOUNTER — TELEPHONE (OUTPATIENT)
Dept: INTERNAL MEDICINE CLINIC | Facility: CLINIC | Age: 78
End: 2023-03-07

## 2023-03-07 DIAGNOSIS — M25.511 ACUTE PAIN OF RIGHT SHOULDER: Primary | ICD-10-CM

## 2023-03-07 NOTE — PROGRESS NOTES
PT Re-Evaluation     Today's date: 3/7/2023  Patient name: Danyell Ibarra  : 1945  MRN: [id number]  Referring provider: Nery Spears MD  Dx:   Encounter Diagnosis     ICD-10-CM    1  Acute pain of right shoulder  M25 511                      Assessment  Assessment details: Pt presents with s/s consistent with a R RTC strain but also is concerning for possible RTC tear secondary to significant active > passive ROM deficits  He will benefit from skilled PT services to address his impairments in order to decrease pain and restore function  Impairments: abnormal muscle firing, abnormal or restricted ROM, abnormal movement, activity intolerance, impaired physical strength, lacks appropriate home exercise program, pain with function, poor posture  and poor body mechanics  Understanding of Dx/Px/POC: good   Prognosis: good    Goals  STG - 4 wks  1) pt will demonstrate 90 deg active FE  2) pt will improve pain 50% (met)    LTG - 8-12 wks  1) pt will demonstrate 135 deg active FE  2) pt will improve pain 90%    Plan  Planned modality interventions: low level laser therapy  Planned therapy interventions: joint mobilization, manual therapy, body mechanics training, neuromuscular re-education, strengthening, stretching, patient education, postural training, therapeutic activities, therapeutic exercise, home exercise program, functional ROM exercises and flexibility  Frequency: 2x week  Duration in weeks: 8  Treatment plan discussed with: patient        Subjective Evaluation    History of Present Illness  Mechanism of injury: Pt is a 68 y o  male with PMHx significant for HTN, CKD II  He reports sudden onset of R shoulder pain and difficulty lifting his arm 6 days ago after pulling up hard on a car seat trying to remove it  He denies radicular symptoms    Pain  Current pain rating: 3  At best pain ratin  At worst pain ratin  Location: anterior R shoulder, upper arm  Quality: dull ache, sharp and tight  Relieving factors: rest and heat  Aggravating factors: overhead activity and lifting  Progression: no change    Hand dominance: right      Diagnostic Tests  No diagnostic tests performed  Treatments  Previous treatment: physical therapy  Current treatment: physical therapy  Patient Goals  Patient goals for therapy: decreased pain, increased motion and return to sport/leisure activities          Objective     Postural Observations  Seated posture: poor  Standing posture: poor  Correction of posture: makes symptoms better        Active Range of Motion   Left Shoulder   Flexion: 155 degrees   Abduction: 150 degrees   External rotation BTH: C7   Internal rotation BTB: T11     Right Shoulder   Flexion: 45 degrees with pain  Abduction: 60 degrees with pain  External rotation BTH: Active external rotation behind the head: ear  Internal rotation BTB: Active internal rotation behind the back: GT  with pain    Passive Range of Motion   Left Shoulder   Flexion: 155 degrees   Abduction: 155 degrees   External rotation 90°: 80 degrees   Internal rotation 90°: 60 degrees     Right Shoulder   Flexion: 150 degrees with pain  Abduction: 150 degrees   External rotation 90°: 70 degrees with pain  Internal rotation 90°: 40 degrees     Joint Play   Left Shoulder  Hypomobile in the posterior capsule and inferior capsule  Right Shoulder  Joints within functional limits are the posterior capsule and inferior capsule  Strength/Myotome Testing     Left Shoulder     Planes of Motion   Flexion: 4+   Abduction: 4+   External rotation at 0°: 4 (improved with postural correction)   External rotation at 90°: 4   Internal rotation at 0°: 4+   Internal rotation at 90°: 4+     Isolated Muscles   Lower trapezius: 4-   Middle trapezius: 4     Right Shoulder     Planes of Motion   Flexion: 2+   Abduction: 2+   External rotation at 0°: 3+   Internal rotation at 0°: 3+     Tests     Left Shoulder   Positive Hawkin's     Negative apprehension, drop arm, external rotation lag sign, Neer's and painful arc  Right Shoulder   Negative drop arm and external rotation lag sign  Precautions:  PMHx: HTN, CKD II  Dx: L subacromial impingement, possible RTC tear; acute R RTC strain    Manuals 3/7            Laser R subacromial space 7000J            L shoulder PROM             Gr IV R inf, post, lat mobs 1x50 ea                                                                Neuro Re-Ed             Prone I's             Prone T's 3x10            B/L FF wall slides: palms flat np            Table slides: R FF  45 deg                                                  Ther Ex             Pulleys: FF, ABD B/  5"x15 ea            L TB ER 90/90 Blue  3x10            L shoulder distraction/ADD stretch 5"x5            R TB ER R/  3x10                                                                Ther Activity                                       Gait Training                                       Modalities

## 2023-03-07 NOTE — TELEPHONE ENCOUNTER
Patient has been doing PT for left shoulder     Few days ago he pulled something in right shoulder and PT saw him while he was there, requesting order PT for the right shoulder      They documented the visit

## 2023-03-09 ENCOUNTER — OFFICE VISIT (OUTPATIENT)
Dept: PHYSICAL THERAPY | Facility: CLINIC | Age: 78
End: 2023-03-09

## 2023-03-09 DIAGNOSIS — M25.511 ACUTE PAIN OF RIGHT SHOULDER: ICD-10-CM

## 2023-03-09 DIAGNOSIS — M25.512 ACUTE PAIN OF LEFT SHOULDER: Primary | ICD-10-CM

## 2023-03-09 NOTE — PROGRESS NOTES
Daily Note     Today's date: 3/9/2023  Patient name: Stef Dean  : 1945  MRN: [id number]  Referring provider: Emmanuel Cantu MD  Dx:   Encounter Diagnosis     ICD-10-CM    1  Acute pain of left shoulder  M25 512       2  Acute pain of right shoulder  M25 511 Ambulatory Referral to Physical Therapy                     Subjective: Pt reports no change in R shoulder pain or ROM  Objective: See treatment diary below    Assessment: Pt demonstrated improved ABD > FF AROM following AAROM TE and MT  Plan: Cont  POC  Precautions:  PMHx: HTN, CKD II  Dx: L subacromial impingement, possible RTC tear; acute R RTC strain    Manuals 3/7 3/9           Laser R subacromial space 7000J 7000J           Gr IV L inf, post, lat mobs  1x50 ea           L shoulder PROM             Gr IV R inf, post, lat mobs 1x50 ea np           Standing FF MWM  1x5           Standing ABD MWM                                                    Neuro Re-Ed             Prone T's 3x10 3x12                        Table slides: R FF   45 deg          Standing FF, ABD AAROM  1x10                                     Ther Ex             Pulleys: FF, ABD B/  5"x15 ea B/  5"x15 ea           L TB ER 90/90 Blue  3x10 Blue  3x10           L shoulder distraction/ADD stretch 5"x5 5"x10           R TB ER R/  3x10 R/  3x12                                                               Ther Activity                                       Gait Training                                       Modalities

## 2023-03-14 ENCOUNTER — OFFICE VISIT (OUTPATIENT)
Dept: PHYSICAL THERAPY | Facility: CLINIC | Age: 78
End: 2023-03-14

## 2023-03-14 DIAGNOSIS — M25.512 ACUTE PAIN OF LEFT SHOULDER: ICD-10-CM

## 2023-03-14 DIAGNOSIS — M25.511 ACUTE PAIN OF RIGHT SHOULDER: Primary | ICD-10-CM

## 2023-03-14 NOTE — PROGRESS NOTES
Daily Note     Today's date: 3/14/2023  Patient name: Gilmer Henning  : 1945  MRN: [id number]  Referring provider: Javier Phillips MD  Dx:   Encounter Diagnosis     ICD-10-CM    1  Acute pain of right shoulder  M25 511       2  Acute pain of left shoulder  M25 512                      Subjective: Pt reports decreased R but increased L shoulder pain and difficulty with active FE  Objective: See treatment diary below    Assessment: Pt demonstrated significantly improved R ABD > FF and L FF > ABD AROM following MWM  Plan: Cont  POC  Precautions:  PMHx: HTN, CKD II  Dx: L subacromial impingement, possible RTC tear; acute R RTC strain    Manuals 3/7 3/9 3/14          Laser R subacromial space 7000J 7000J B/  4000J ea          Gr IV L post, lat mobs  1x50 ea 1x50 ea          L shoulder PROM             Gr IV R inf, post, lat mobs 1x50 ea np           Standing FF MWM  1x5 B/  1x10 ea          Standing ABD MWM   B/  1x10 ea                                                 Neuro Re-Ed             Prone T's 3x10 3x12 3x12                       Table slides: R FF   45 deg          Standing FF, ABD AAROM  1x10 B/  1x10  1x5 ea                                    Ther Ex             Pulleys: FF, ABD B/  5"x15 ea B/  5"x15 ea B/  5"x15 ea          L TB ER 90/90 Blue  3x10 Blue  3x10           L shoulder distraction/ADD stretch 5"x5 5"x10 5"x10          R TB ER R/  3x10 R/  3x12 G/  3x10          L TB ER   Black  3x10                                                 Ther Activity                                       Gait Training                                       Modalities

## 2023-03-16 ENCOUNTER — OFFICE VISIT (OUTPATIENT)
Dept: PHYSICAL THERAPY | Facility: CLINIC | Age: 78
End: 2023-03-16

## 2023-03-16 DIAGNOSIS — M25.511 ACUTE PAIN OF RIGHT SHOULDER: ICD-10-CM

## 2023-03-16 DIAGNOSIS — M25.512 ACUTE PAIN OF LEFT SHOULDER: Primary | ICD-10-CM

## 2023-03-16 NOTE — PROGRESS NOTES
Daily Note     Today's date: 3/16/2023  Patient name: Jeovanny Ernandez  : 1945  MRN: [id number]  Referring provider: Tiffany Cobb MD  Dx:   Encounter Diagnosis     ICD-10-CM    1  Acute pain of left shoulder  M25 512       2  Acute pain of right shoulder  M25 511                      Subjective: Pt reports B posterior shoulder soreness at rest starting 30 hrs following last visit  Objective: See treatment diary below  Decreased intensity of TE    Assessment: Pt demonstrated improved L > R AROM even prior to MWM and improved further following  Plan: Cont  POC  Precautions:  PMHx: HTN, CKD II  Dx: L subacromial impingement, possible RTC tear; acute R RTC strain    Manuals 3/7 3/9 3/14 3/16         Laser R subacromial space 7000J 7000J B/  4000J ea B/  4000J ea         Gr IV L post, lat mobs  1x50 ea 1x50 ea 1x50 ea         L shoulder PROM             Gr IV R inf, post, lat mobs 1x50 ea np           Standing FF MWM  1x5 B/  1x10 ea R/  1x10 ea         Standing ABD MWM   B/  1x10 ea B/  1x10 ea                                                Neuro Re-Ed             Prone T's 3x10 3x12 3x12 3x12                      Table slides: R FF             Standing FF, ABD AAROM  1x10 B/  1x10  1x5 ea B/  1x5 ea                                   Ther Ex             Pulleys: FF, ABD B/  5"x15 ea B/  5"x15 ea B/  5"x15 ea B/  5"x15 ea         L TB ER 90/90 Blue  3x10 Blue  3x10           L shoulder distraction/ADD stretch 5"x5 5"x10 5"x10 L/  5"x10  R/  5"x3         R TB ER R/  3x10 R/  3x12 G/  3x10 R/  3x15         L TB ER   Black  3x10 Blue  3x15                                                Ther Activity                                       Gait Training                                       Modalities

## 2023-03-21 ENCOUNTER — OFFICE VISIT (OUTPATIENT)
Dept: PHYSICAL THERAPY | Facility: CLINIC | Age: 78
End: 2023-03-21

## 2023-03-21 DIAGNOSIS — M25.512 ACUTE PAIN OF LEFT SHOULDER: Primary | ICD-10-CM

## 2023-03-21 DIAGNOSIS — M25.511 ACUTE PAIN OF RIGHT SHOULDER: ICD-10-CM

## 2023-03-21 NOTE — PROGRESS NOTES
Daily Note     Today's date: 3/21/2023  Patient name: Eleni Hooper  : 1945  MRN: [id number]  Referring provider: Obie Solo MD  Dx:   Encounter Diagnosis     ICD-10-CM    1  Acute pain of left shoulder  M25 512       2  Acute pain of right shoulder  M25 511           Start Time: 1000  Stop Time: 1040  Total time in clinic (min): 40 minutes    Subjective: Patient reports, "The R shoulder is improving but the L is about the same"  Notes improvements following therapy regarding pain and AROM  Objective: See treatment diary below    Assessment: Adapted and modified exercises depending on patient response  Prone T's required less AROM with the progression of more AROM as tolerated  Overall improvement after therapy noting less pain and more AROM  Plan: Cont  POC  Precautions:  PMHx: HTN, CKD II  Dx: L subacromial impingement, possible RTC tear; acute R RTC strain    Manuals 3/7 3/9 3/14 3/16 3/21        Laser R subacromial space 7000J 7000J B/  4000J ea B/  4000J ea B/  4000J ea        Gr IV L post, lat mobs  1x50 ea 1x50 ea 1x50 ea         L shoulder PROM             Gr IV R inf, post, lat mobs 1x50 ea np           Standing FF MWM  1x5 B/  1x10 ea R/  1x10 ea         Standing ABD MWM   B/  1x10 ea B/  1x10 ea                                                Neuro Re-Ed             Prone T's 3x10 3x12 3x12 3x12 3x12                     Table slides: R FF             Standing FF, ABD AAROM  1x10 B/  1x10  1x5 ea B/  1x5 ea B/  1x5 ea                                  Ther Ex             Pulleys: FF, ABD B/  5"x15 ea B/  5"x15 ea B/  5"x15 ea B/  5"x15 ea B/  5"x15 ea        L TB ER 90/90 Blue  3x10 Blue  3x10           L shoulder distraction/ADD stretch 5"x5 5"x10 5"x10 L/  5"x10  R/  5"x3 5"x10 ea        R TB ER R/  3x10 R/  3x12 G/  3x10 R/  3x15 G/  3x10        L TB ER   Black  3x10 Blue  3x15 Blue  3x15                                               Ther Activity Gait Training                                       Modalities

## 2023-03-23 ENCOUNTER — OFFICE VISIT (OUTPATIENT)
Dept: PHYSICAL THERAPY | Facility: CLINIC | Age: 78
End: 2023-03-23

## 2023-03-23 DIAGNOSIS — M25.512 ACUTE PAIN OF LEFT SHOULDER: Primary | ICD-10-CM

## 2023-03-23 DIAGNOSIS — M25.511 ACUTE PAIN OF RIGHT SHOULDER: ICD-10-CM

## 2023-03-23 NOTE — PROGRESS NOTES
Daily Note     Today's date: 3/23/2023  Patient name: Maged Bolden  : 1945  MRN: [id number]  Referring provider: Jazmine Pemberton MD  Dx:   Encounter Diagnosis     ICD-10-CM    1  Acute pain of left shoulder  M25 512       2  Acute pain of right shoulder  M25 511                      Subjective: Pt reports improved R>L shoulder pain and ROM  Objective: See treatment diary below    Assessment: Pt demonstrated improved active FE  Plan: Cont  POC  Precautions:  PMHx: HTN, CKD II  Dx: L subacromial impingement, possible RTC tear; acute R RTC strain    Manuals 3/7 3/9 3/14 3/16 3/21 3/23       Laser R subacromial space 7000J 7000J B/  4000J ea B/  4000J ea B/  4000J ea B/  4000J ea       Gr IV L post, lat mobs  1x50 ea 1x50 ea 1x50 ea  1x60 ea       L shoulder PROM             Gr IV R inf, post, lat mobs 1x50 ea np           Standing FF MWM  1x5 B/  1x10 ea R/  1x10 ea  B/  1x5       Standing ABD MWM   B/  1x10 ea B/  1x10 ea  B/  1x5                                              Neuro Re-Ed             Prone T's 3x10 3x12 3x12 3x12 3x12 3x10                    Table slides: R FF             Standing FF, ABD AAROM  1x10 B/  1x10  1x5 ea B/  1x5 ea B/  1x5 ea B/  1x10 ea                                 Ther Ex             Pulleys: FF, ABD B/  5"x15 ea B/  5"x15 ea B/  5"x15 ea B/  5"x15 ea B/  5"x15 ea B/  5"x15       L TB ER 90/90 Blue  3x10 Blue  3x10           L shoulder distraction/ADD stretch 5"x5 5"x10 5"x10 L/  5"x10  R/  5"x3 5"x10 ea 5"x10 ea       R TB ER R/  3x10 R/  3x12 G/  3x10 R/  3x15 G/  3x10 G/  3x10       L TB ER   Black  3x10 Blue  3x15 Blue  3x15 Black  3x10       Doorway pec stretch                                       Ther Activity                                       Gait Training                                       Modalities

## 2023-03-28 ENCOUNTER — OFFICE VISIT (OUTPATIENT)
Dept: PHYSICAL THERAPY | Facility: CLINIC | Age: 78
End: 2023-03-28

## 2023-03-28 DIAGNOSIS — M25.511 ACUTE PAIN OF RIGHT SHOULDER: ICD-10-CM

## 2023-03-28 DIAGNOSIS — M25.512 ACUTE PAIN OF LEFT SHOULDER: Primary | ICD-10-CM

## 2023-03-28 NOTE — PROGRESS NOTES
"Daily Note     Today's date: 3/28/2023  Patient name: Cale Rivera  : 1945  MRN: [id number]  Referring provider: Carolina Edwards MD  Dx:   Encounter Diagnosis     ICD-10-CM    1  Acute pain of left shoulder  M25 512       2  Acute pain of right shoulder  M25 511                      Subjective: Pt reports slowly improving L shoulder pain and R shoulder ROM  Objective: See treatment diary below    Assessment: Pt demonstrated improved active FE R>L and normalized following MWM  Plan: Cont  POC  Add doorway pec stretch nv  Precautions:  PMHx: HTN, CKD II  Dx: L subacromial impingement, possible RTC tear; acute R RTC strain    Manuals 3/7 3/9 3/14 3/16 3/21 3/23 3/28      Laser R subacromial space 7000J 7000J B/  4000J ea B/  4000J ea B/  4000J ea B/  4000J ea B/  4000J ea      Gr IV L post, lat mobs  1x50 ea 1x50 ea 1x50 ea  1x60 ea 1x60 ea      L shoulder PROM             Gr IV R inf, post, lat mobs 1x50 ea np           Standing FF MWM  1x5 B/  1x10 ea R/  1x10 ea  B/  1x5 R/  2x10      Standing ABD MWM   B/  1x10 ea B/  1x10 ea  B/  1x5 R/  2x10                                             Neuro Re-Ed             Prone T's 3x10 3x12 3x12 3x12 3x12 3x10 3x10                   Table slides: R FF             Standing FF, ABD AAROM  1x10 B/  1x10  1x5 ea B/  1x5 ea B/  1x5 ea B/  1x10 ea B/  2x10 ea                                Ther Ex             Pulleys: FF, ABD B/  5\"x15 ea B/  5\"x15 ea B/  5\"x15 ea B/  5\"x15 ea B/  5\"x15 ea B/  5\"x15 B/  5\"x15 ea      L TB ER 90/90 Blue  3x10 Blue  3x10     R/  3x10 R/  3x12     L shoulder distraction/ADD stretch 5\"x5 5\"x10 5\"x10 L/  5\"x10  R/  5\"x3 5\"x10 ea 5\"x10 ea B/  5\"x10      R TB ER R/  3x10 R/  3x12 G/  3x10 R/  3x15 G/  3x10 G/  3x10 G/  3x10 G/  3x12     L TB ER   Black  3x10 Blue  3x15 Blue  3x15 Black  3x10       Doorway pec stretch       nv                                Ther Activity                                       Gait Training       " Modalities

## 2023-03-29 DIAGNOSIS — E78.2 MIXED HYPERLIPIDEMIA: ICD-10-CM

## 2023-03-29 RX ORDER — PRAVASTATIN SODIUM 10 MG
10 TABLET ORAL DAILY
Qty: 90 TABLET | Refills: 1 | Status: SHIPPED | OUTPATIENT
Start: 2023-03-29

## 2023-03-30 ENCOUNTER — OFFICE VISIT (OUTPATIENT)
Dept: PHYSICAL THERAPY | Facility: CLINIC | Age: 78
End: 2023-03-30

## 2023-03-30 DIAGNOSIS — M25.512 ACUTE PAIN OF LEFT SHOULDER: ICD-10-CM

## 2023-03-30 DIAGNOSIS — M25.511 ACUTE PAIN OF RIGHT SHOULDER: Primary | ICD-10-CM

## 2023-03-30 NOTE — PROGRESS NOTES
"Daily Note     Today's date: 3/30/2023  Patient name: Jimenez Mckeon  : 1945  MRN: [id number]  Referring provider: Kye Rosado MD  Dx:   Encounter Diagnosis     ICD-10-CM    1  Acute pain of right shoulder  M25 511       2  Acute pain of left shoulder  M25 512                      Subjective: Pt reports improved pain and ROM since last visit  Objective: See treatment diary below    Assessment: Pt demonstrated improved L>R AROM  Plan: Cont  POC  Trial inferior R GH mobs nv  Precautions:  PMHx: HTN, CKD II  Dx: L subacromial impingement, possible RTC tear; acute R RTC strain    [facility] 3/7 3/9 3/14 3/16 3/21 3/23 3/28 3/30     Laser R subacromial space 7000J 7000J B/  4000J ea B/  4000J ea B/  4000J ea B/  4000J ea B/  4000J ea B/  4000J ea     Gr IV L post, lat mobs  1x50 ea 1x50 ea 1x50 ea  1x60 ea 1x60 ea 1x60 ea     L shoulder PROM             Gr IV R inf, post, lat mobs 1x50 ea np           Standing FF MWM  1x5 B/  1x10 ea R/  1x10 ea  B/  1x5 R/  2x10 R/  2x10     Standing ABD MWM   B/  1x10 ea B/  1x10 ea  B/  1x5 R/  2x10                                             Neuro Re-Ed             Prone T's 3x10 3x12 3x12 3x12 3x12 3x10 3x10 3x15 3x15                 Table slides: R FF             Standing FF, ABD AAROM  1x10 B/  1x10  1x5 ea B/  1x5 ea B/  1x5 ea B/  1x10 ea B/  2x10 ea B/  2x10 ea                               Ther Ex             Pulleys: FF, ABD B/  5\"x15 ea B/  5\"x15 ea B/  5\"x15 ea B/  5\"x15 ea B/  5\"x15 ea B/  5\"x15 B/  5\"x15 ea B/  5\"x15 ea     L TB ER 90/90 Blue  3x10 Blue  3x10     R/  3x10 R/  3x12     L shoulder distraction/ADD stretch 5\"x5 5\"x10 5\"x10 L/  5\"x10  R/  5\"x3 5\"x10 ea 5\"x10 ea B/  5\"x10 B/  5\"x10     R TB ER R/  3x10 R/  3x12 G/  3x10 R/  3x15 G/  3x10 G/  3x10 G/  3x10 G/  3x12     L TB ER   Black  3x10 Blue  3x15 Blue  3x15 Black  3x10       Doorway pec stretch       nv                                Ther Activity                                  " Gait Training                                       Modalities

## 2023-04-04 ENCOUNTER — OFFICE VISIT (OUTPATIENT)
Dept: PHYSICAL THERAPY | Facility: CLINIC | Age: 78
End: 2023-04-04

## 2023-04-04 DIAGNOSIS — M25.511 ACUTE PAIN OF RIGHT SHOULDER: Primary | ICD-10-CM

## 2023-04-04 DIAGNOSIS — M25.512 ACUTE PAIN OF LEFT SHOULDER: ICD-10-CM

## 2023-04-04 NOTE — PROGRESS NOTES
"Daily Note     Today's date: 2023  Patient name: Ena Arriaza  : 1945  MRN: [id number]  Referring provider: Devorah Cronin MD  Dx:   Encounter Diagnosis     ICD-10-CM    1  Acute pain of right shoulder  M25 511       2  Acute pain of left shoulder  M25 512                      Subjective: Pt reports R shoulder weakness and L shoulder pain with > 90 deg FE  Objective: See treatment diary below    Assessment: Pt able to normalize B active FE following MWM and neuro re-ed  Plan: Cont  POC  Precautions:  PMHx: HTN, CKD II  Dx: L subacromial impingement, possible RTC tear; acute R RTC strain    [facility] 3/7 3/9 3/14 3/16 3/21 3/23 3/28 3/30 4/4    Laser R subacromial space 7000J 7000J B/  4000J ea B/  4000J ea B/  4000J ea B/  4000J ea B/  4000J ea B/  4000J ea B/  3000J ea    Gr IV L post, lat mobs  1x50 ea 1x50 ea 1x50 ea  1x60 ea 1x60 ea 1x60 ea 1x60 ea    L shoulder PROM             Gr IV R inf, post, lat mobs 1x50 ea np           Standing FF MWM  1x5 B/  1x10 ea R/  1x10 ea  B/  1x5 R/  2x10 R/  2x10 R/  1x10    Standing ABD MWM   B/  1x10 ea B/  1x10 ea  B/  1x5 R/  2x10  L/  1x10                                           Neuro Re-Ed             Prone T's 3x10 3x12 3x12 3x12 3x12 3x10 3x10 3x15 3x12 3x15                Table slides: R FF             Standing FF, ABD AAROM  1x10 B/  1x10  1x5 ea B/  1x5 ea B/  1x5 ea B/  1x10 ea B/  2x10 ea B/  2x10 ea B/  2x10 ea                              Ther Ex             Pulleys: FF, ABD B/  5\"x15 ea B/  5\"x15 ea B/  5\"x15 ea B/  5\"x15 ea B/  5\"x15 ea B/  5\"x15 B/  5\"x15 ea B/  5\"x15 ea B  5\"/  2x10    L TB ER 90/90 Blue  3x10 Blue  3x10     R/  3x10 R/  3x12 G/  3x10    L shoulder distraction/ADD stretch 5\"x5 5\"x10 5\"x10 L/  5\"x10  R/  5\"x3 5\"x10 ea 5\"x10 ea B/  5\"x10 B/  5\"x10 B/  5\"x10    R TB ER R/  3x10 R/  3x12 G/  3x10 R/  3x15 G/  3x10 G/  3x10 G/  3x10 G/  3x12 G/  3x15    L TB ER   Black  3x10 Blue  3x15 Blue  3x15 Black  3x10     " Jumana pec stretch       nv                                Ther Activity                                       Gait Training                                       Modalities

## 2023-04-06 ENCOUNTER — OFFICE VISIT (OUTPATIENT)
Dept: PHYSICAL THERAPY | Facility: CLINIC | Age: 78
End: 2023-04-06

## 2023-04-06 DIAGNOSIS — M25.511 ACUTE PAIN OF RIGHT SHOULDER: Primary | ICD-10-CM

## 2023-04-06 DIAGNOSIS — M25.512 ACUTE PAIN OF LEFT SHOULDER: ICD-10-CM

## 2023-04-06 NOTE — PROGRESS NOTES
"Daily Note     Today's date: 2023  Patient name: Delaney Thomas  : 1945  MRN: [id number]  Referring provider: Ania Justin MD  Dx:   Encounter Diagnosis     ICD-10-CM    1  Acute pain of right shoulder  M25 511       2  Acute pain of left shoulder  M25 512                      Subjective: Pt reports R shoulder weakness and L shoulder pain with > 90 deg FE  Objective: See treatment diary below    Assessment: Pt demonstrated improved pain and arthrokinematics after TE to address rounded shoulders/thoracic kyphosis  Plan: Cont  POC to improve GH and ST arthrokinematics  Precautions:  PMHx: HTN, CKD II  Dx: L subacromial impingement, possible RTC tear; acute R RTC strain    Manuals             Laser R subacromial space          3000J ea   Gr IV L post, inf, lat mobs          1x75 ea   L shoulder PROM             Gr IV R inf, post, lat mobs             Standing FF MWM          R/  1x10   Standing ABD MWM                                                    Neuro Re-Ed             Prone T's          3x15                             Standing FF, ABD AAROM          B/  1x10 ea   Standing scaption AAROM          B/  1x10 ea                Ther Ex             Pulleys: FF, ABD          B/  5\"/  1x10 ea   L TB ER 90/90          G/  3x15   L shoulder distraction/ADD stretch          B/  5\"x10   R TB ER          G/  3x15                Doorway pec stretch          10\"x3   Supine t/s extension SNAGs          5\"x5                Ther Activity                                       Gait Training                                       Modalities                                            "

## 2023-04-25 ENCOUNTER — OFFICE VISIT (OUTPATIENT)
Dept: PHYSICAL THERAPY | Facility: CLINIC | Age: 78
End: 2023-04-25

## 2023-04-25 DIAGNOSIS — M25.511 ACUTE PAIN OF RIGHT SHOULDER: Primary | ICD-10-CM

## 2023-04-25 DIAGNOSIS — M25.512 ACUTE PAIN OF LEFT SHOULDER: ICD-10-CM

## 2023-04-25 NOTE — PROGRESS NOTES
Daily Note     Today's date: 2023  Patient name: Shailesh Iniguez  : 1945  MRN: [id number]  Referring provider: Alden Masterson MD  Dx:   Encounter Diagnosis     ICD-10-CM    1  Acute pain of right shoulder  M25 511       2  Acute pain of left shoulder  M25 512                      Subjective: Pt reports significantly improved pain and ROM x 4 days following last visit however woke up with moderate R shoulder pain this morning  Objective: See treatment diary below    Assessment: Pt demonstrated full FE with mid ROM catch R>L prior to reductive TE and a resolution of mid ROM catch following reductive TE and laser  Plan: Continue per plan of care  Precautions:  PMHx: HTN, CKD II  Dx: L subacromial impingement, possible RTC tear; acute R RTC strain    [facility]           Laser R subacromial space 5000J 5000J 5000J          Laser L subacromial space 3000J 3000J 3000J                                                                           Neuro Re-Ed             Seated t/s extension 1x10 1x10 1x10          C/s retraction OP 1x10 1x10 1x10          C/s retraction OP extension 1x10 1x10 1x10          Standing shoulder extension palms back R/  3x10  L/  1x10 3x10 ea 3x10 ea          Prone T's 3x10 3x10 3x15                                                                           Ther Ex             Pulleys: FF, ABD held            L TB ER 90/90 G/  3x15 G/  3x15 Blue  3x10          L shoulder distraction/ADD stretch held            R TB ER G/  3x15 G/  3x15 G/  3x15          Supine t/s extension SNAGs 1x10 1x10 1x10                                                 Ther Activity                                       Gait Training                                       Modalities

## 2023-04-27 ENCOUNTER — APPOINTMENT (OUTPATIENT)
Dept: PHYSICAL THERAPY | Facility: CLINIC | Age: 78
End: 2023-04-27

## 2023-05-02 ENCOUNTER — OFFICE VISIT (OUTPATIENT)
Dept: PHYSICAL THERAPY | Facility: CLINIC | Age: 78
End: 2023-05-02

## 2023-05-02 DIAGNOSIS — M25.511 ACUTE PAIN OF RIGHT SHOULDER: Primary | ICD-10-CM

## 2023-05-02 DIAGNOSIS — M25.512 ACUTE PAIN OF LEFT SHOULDER: ICD-10-CM

## 2023-05-02 NOTE — PROGRESS NOTES
"Daily Note     Today's date: 2023  Patient name: Idania Lopez  : 1945  MRN: [id number]  Referring provider: Otf Alcantara MD  Dx:   Encounter Diagnosis     ICD-10-CM    1  Acute pain of right shoulder  M25 511       2  Acute pain of left shoulder  M25 512           Start Time: 1000  Stop Time: 1040  Total time in clinic (min): 40 minutes    Subjective: Patient states, this morning my shoulders are feeling tired  L shoulder \"5/10\" and R shoulder \"4/10\" at most with AROM  Notes the exercises have been helping his pain  States, \"I have to complete some [place] work this afternoon  So we will see how I feel NV\"  Objective: See treatment diary below    Assessment: Patient having improvments with pain but still has catching R > L during AROM  Plan: Continue per plan of care  Precautions:  PMHx: HTN, CKD II  Dx: L subacromial impingement, possible RTC tear; acute R RTC strain    [facility]          Laser R subacromial space 5000J 5000J 5000J 5000J         Laser L subacromial space 3000J 3000J 3000J 3000J                                                                          Neuro Re-Ed             Seated t/s extension 1x10 1x10 1x10 3x10         C/s retraction OP 1x10 1x10 1x10 3x10         C/s retraction OP extension 1x10 1x10 1x10 3x10         Standing shoulder extension palms back R/  3x10  L/  1x10 3x10 ea 3x10 ea 3x10 ea         Prone T's 3x10 3x10 3x15 3x15                                                                          Ther Ex             Pulleys: FF, ABD held            L TB ER 90/90 G/  3x15 G/  3x15 Blue  3x10 Blue  3x10         L shoulder distraction/ADD stretch held            R TB ER G/  3x15 G/  3x15 G/  3x15 G/  3x15         Supine t/s extension SNAGs 1x10 1x10 1x10 1x10                                                Ther Activity                                       Gait Training                                       Modalities                       "

## 2023-05-04 ENCOUNTER — OFFICE VISIT (OUTPATIENT)
Dept: PHYSICAL THERAPY | Facility: CLINIC | Age: 78
End: 2023-05-04

## 2023-05-04 DIAGNOSIS — M25.512 ACUTE PAIN OF LEFT SHOULDER: ICD-10-CM

## 2023-05-04 DIAGNOSIS — M25.511 ACUTE PAIN OF RIGHT SHOULDER: Primary | ICD-10-CM

## 2023-05-04 NOTE — PROGRESS NOTES
Daily Note     Today's date: 2023  Patient name: Bartolome Callaway  : 1945  MRN: [id number]  Referring provider: Chantel Villanueva MD  Dx:   Encounter Diagnosis     ICD-10-CM    1  Acute pain of right shoulder  M25 511       2  Acute pain of left shoulder  M25 512                      Subjective: Pt reports B shoulder achiness at rest since doing a lot of [place] work on Tuesday  Objective: See treatment diary below    Assessment: Pt demonstrated a resolution of resting and mid ROM pain B/L and end ROM pain on the L following sustained vs repeated shoulder extension, worsened with repeated IR on the R     Plan: Continue per plan of care  Precautions:  PMHx: HTN, CKD II  Dx: L subacromial impingement, possible RTC tear; acute R RTC strain    [facility]         Laser R subacromial space 5000J 5000J 5000J 5000J 5000J        Laser L subacromial space 3000J 3000J 3000J 3000J 3000J                                                                         Neuro Re-Ed             Seated t/s extension 1x10 1x10 1x10 3x10 3x10        C/s retraction OP 1x10 1x10 1x10 3x10 3x10        C/s retraction OP extension 1x10 1x10 1x10 3x10 3x10        Standing shoulder extension palms back R/  3x10  L/  1x10 3x10 ea 3x10 ea 3x10 ea         Sustained shoulder extension     B/  3x1 min        Prone T's 3x10 3x10 3x15 3x15         B TB T's     Y/  3x10                                                            Ther Ex             Pulleys: FF, ABD held            L TB ER 90/90 G/  3x15 G/  3x15 Blue  3x10 Blue  3x10 Blue  3x10 Blue  3x12       L shoulder distraction/ADD stretch held            R TB ER G/  3x15 G/  3x15 G/  3x15 G/  3x15 Blue  3x10 Blue  3x12       Supine t/s extension SNAGs 1x10 1x10 1x10 1x10 1x10                                               Ther Activity                                       Gait Training                                       Modalities

## 2023-05-09 ENCOUNTER — OFFICE VISIT (OUTPATIENT)
Dept: PHYSICAL THERAPY | Facility: CLINIC | Age: 78
End: 2023-05-09

## 2023-05-09 DIAGNOSIS — M25.512 ACUTE PAIN OF LEFT SHOULDER: ICD-10-CM

## 2023-05-09 DIAGNOSIS — M25.511 ACUTE PAIN OF RIGHT SHOULDER: Primary | ICD-10-CM

## 2023-05-09 NOTE — PROGRESS NOTES
Daily Note     Today's date: 2023  Patient name: Patricia Charles  : 1945  MRN: [id number]  Referring provider: Tricia Odom MD  Dx:   Encounter Diagnosis     ICD-10-CM    1  Acute pain of right shoulder  M25 511       2  Acute pain of left shoulder  M25 512                      Subjective: Pt reports a resolution of B shoulder pain at rest and less frequent and intensity pain L>R with OH reaching  Objective: See treatment diary below    Assessment: Pt demonstrated improved B shoulder AROM and pain  Plan: Continue per plan of care  Precautions:  PMHx: HTN, CKD II  Dx: L subacromial impingement, possible RTC tear; acute R RTC strain    Manuals        Laser R subacromial space 5000J 5000J 5000J 5000J 5000J 5000J       Laser L subacromial space 3000J 3000J 3000J 3000J 3000J 3000J                                                                        Neuro Re-Ed             Seated t/s extension 1x10 1x10 1x10 3x10 3x10 3x10       C/s retraction OP 1x10 1x10 1x10 3x10 3x10 3x10       C/s retraction OP extension 1x10 1x10 1x10 3x10 3x10 3x10       Standing shoulder extension palms back R/  3x10  L/  1x10 3x10 ea 3x10 ea 3x10 ea         Sustained shoulder extension     B/  3x1 min B/  3x1 min       Prone T's 3x10 3x10 3x15 3x15         B TB T's     Y/  3x10 Y/  3x10 Y/  3x12                                                          Ther Ex             Pulleys: FF, ABD held            L TB ER 90/90 G/  3x15 G/  3x15 Blue  3x10 Blue  3x10 Blue  3x10 Blue  3x10 Blue  3x12      L shoulder distraction/ADD stretch held            R TB ER G/  3x15 G/  3x15 G/  3x15 G/  3x15 Blue  3x10 Blue  3x10 Blue  3x12      Supine t/s extension SNAGs 1x10 1x10 1x10 1x10 1x10 HEP                                              Ther Activity                                       Gait Training                                       Modalities

## 2023-05-11 ENCOUNTER — OFFICE VISIT (OUTPATIENT)
Dept: PHYSICAL THERAPY | Facility: CLINIC | Age: 78
End: 2023-05-11

## 2023-05-11 DIAGNOSIS — M25.511 ACUTE PAIN OF RIGHT SHOULDER: Primary | ICD-10-CM

## 2023-05-11 DIAGNOSIS — M25.512 ACUTE PAIN OF LEFT SHOULDER: ICD-10-CM

## 2023-05-11 NOTE — PROGRESS NOTES
Daily Note     Today's date: 2023  Patient name: Michel Cheatham  : 1945  MRN: [id number]  Referring provider: Shanika Gabriel MD  Dx:   Encounter Diagnosis     ICD-10-CM    1  Acute pain of right shoulder  M25 511       2  Acute pain of left shoulder  M25 512                      Subjective: Pt reports no further improvement in B shoulder pain and arthrokinematics since last visit  Objective: See treatment diary below    Assessment: Pt able to abolish pain and nearly normalize arthrokinematics on the R with repeated extension and scap strengthening and with sustained extension and ABD MWM on the L  Plan: Cont  POC  Progress to R TB ER 90/90 nv  Precautions:  PMHx: HTN, CKD II  Dx: L subacromial impingement, possible RTC tear; acute R RTC strain    Manuals       Laser R subacromial space 5000J 5000J 5000J 5000J 5000J 5000J 3000J      Laser L subacromial space 3000J 3000J 3000J 3000J 3000J 3000J 3000J      L ABD MWM       2x10                                                          Neuro Re-Ed             Seated t/s extension 1x10 1x10 1x10 3x10 3x10 3x10 3x10      C/s retraction OP 1x10 1x10 1x10 3x10 3x10 3x10 3x10      C/s retraction OP extension 1x10 1x10 1x10 3x10 3x10 3x10 3x10      Standing shoulder extension palms back R/  3x10  L/  1x10 3x10 ea 3x10 ea 3x10 ea   B/  5x10 R/  5x10     Sustained shoulder extension     B/  3x1 min B/  3x1 min  L/  3x1 min     Prone T's 3x10 3x10 3x15 3x15         B TB T's     Y/  3x10 Y/  3x10 Y/  3x15 Y/  3x15                                                         Ther Ex             Pulleys: FF, ABD held            L TB ER 90/90 G/  3x15 G/  3x15 Blue  3x10 Blue  3x10 Blue  3x10 Blue  3x10 Blue  3x12 Blue  3x15     R TB ER 90/90       nv R/  3x10     R TB ER G/  3x15 G/  3x15 G/  3x15 G/  3x15 Blue  3x10 Blue  3x10 Blue  3x12 np     Supine t/s extension SNAGs 1x10 1x10 1x10 1x10 1x10 HEP Ther Activity                                       Gait Training                                       Modalities

## 2023-05-16 ENCOUNTER — APPOINTMENT (OUTPATIENT)
Dept: PHYSICAL THERAPY | Facility: CLINIC | Age: 78
End: 2023-05-16
Payer: MEDICARE

## 2023-05-18 ENCOUNTER — OFFICE VISIT (OUTPATIENT)
Dept: PHYSICAL THERAPY | Facility: CLINIC | Age: 78
End: 2023-05-18

## 2023-05-18 DIAGNOSIS — M25.511 ACUTE PAIN OF RIGHT SHOULDER: ICD-10-CM

## 2023-05-18 DIAGNOSIS — M25.512 ACUTE PAIN OF LEFT SHOULDER: Primary | ICD-10-CM

## 2023-05-18 NOTE — PROGRESS NOTES
Daily Note     Today's date: 2023  Patient name: Karina Camacho  : 1945  MRN: [id number]  Referring provider: Kong Blankenship MD  Dx:   Encounter Diagnosis     ICD-10-CM    1  Acute pain of left shoulder  M25 512       2  Acute pain of right shoulder  M25 511                      Subjective: Pt reports no pain x 48 hrs following last visit then return to baseline  Objective: See treatment diary below    Assessment: Pt able to normalize L arthrokinematics without MWM, improved on R following MWM  Plan: Cont  POC  Precautions:  PMHx: HTN, CKD II  Dx: L subacromial impingement, possible RTC tear; acute R RTC strain    Manuals      Laser R subacromial space 5000J 5000J 5000J 5000J 5000J 5000J 3000J 3000J     Laser L subacromial space 3000J 3000J 3000J 3000J 3000J 3000J 3000J 3000J     L ABD MWM       2x10 R/  2x10                                                         Neuro Re-Ed             Seated t/s extension 1x10 1x10 1x10 3x10 3x10 3x10 3x10 3x10     C/s retraction OP 1x10 1x10 1x10 3x10 3x10 3x10 3x10 3x10     C/s retraction OP extension 1x10 1x10 1x10 3x10 3x10 3x10 3x10 3x10     Standing shoulder extension palms back R/  3x10  L/  1x10 3x10 ea 3x10 ea 3x10 ea   B/  5x10 R/  5x10     Sustained shoulder extension     B/  3x1 min B/  3x1 min  L/  3x1 min     Prone T's 3x10 3x10 3x15 3x15         B TB T's     Y/  3x10 Y/  3x10 Y/  3x15 Y/  3x15                                                         Ther Ex             Pulleys: FF, ABD held            L TB ER 90/90 G/  3x15 G/  3x15 Blue  3x10 Blue  3x10 Blue  3x10 Blue  3x10 Blue  3x12 Blue  3x15     R TB ER 90/90       nv R/  3x10     R TB ER G/  3x15 G/  3x15 G/  3x15 G/  3x15 Blue  3x10 Blue  3x10 Blue  3x12 np     Supine t/s extension SNAGs 1x10 1x10 1x10 1x10 1x10 HEP                                              Ther Activity                                       Gait Training Modalities

## 2023-05-23 ENCOUNTER — OFFICE VISIT (OUTPATIENT)
Dept: PHYSICAL THERAPY | Facility: CLINIC | Age: 78
End: 2023-05-23

## 2023-05-23 DIAGNOSIS — M25.512 ACUTE PAIN OF LEFT SHOULDER: Primary | ICD-10-CM

## 2023-05-23 DIAGNOSIS — M25.511 ACUTE PAIN OF RIGHT SHOULDER: ICD-10-CM

## 2023-05-23 NOTE — PROGRESS NOTES
"Daily Note     Today's date: 2023  Patient name: Yared Samuels  : 1945  MRN: [id number]  Referring provider: Antonino Hollingsworth MD  Dx:   Encounter Diagnosis     ICD-10-CM    1  Acute pain of left shoulder  M25 512       2  Acute pain of right shoulder  M25 511                      Subjective: Pt reports B shoulder \"heaviness\" and weakness with active FE with occasional catching that usually abolishes with repetition  Objective: See treatment diary below    Assessment: Pt demonstrated no change with repeated or sustained B shoulder extension or c/s extension  Plan: Trial repeated FE nv     Precautions:  PMHx: HTN, CKD II  Dx: L subacromial impingement, possible RTC tear; acute R RTC strain    Manuals     Laser R subacromial space 5000J 5000J 5000J 5000J 5000J 5000J 3000J 3000J 3000J    Laser L subacromial space 3000J 3000J 3000J 3000J 3000J 3000J 3000J 3000J 3000J    L ABD MWM       2x10 R/  2x10 B/  2x10 ea                                                        Neuro Re-Ed             Seated t/s extension 1x10 1x10 1x10 3x10 3x10 3x10 3x10 3x10 3x10    C/s retraction OP 1x10 1x10 1x10 3x10 3x10 3x10 3x10 3x10 3x10    C/s retraction OP extension 1x10 1x10 1x10 3x10 3x10 3x10 3x10 3x10 3x10    Standing shoulder extension palms back R/  3x10  L/  1x10 3x10 ea 3x10 ea 3x10 ea   B/  5x10 R/  5x10 R/  3x10 hold   Sustained shoulder extension     B/  3x1 min B/  3x1 min  L/  3x1 min L/  3x1 min hold   Prone T's 3x10 3x10 3x15 3x15         B TB T's     Y/  3x10 Y/  3x10 Y/  3x15 Y/  3x15 Y/  3x15    FF FROM          B/  1x10   ABD FROM          B/  1x10                             Ther Ex             Pulleys: FF, ABD held            L TB ER 90/90 G/  3x15 G/  3x15 Blue  3x10 Blue  3x10 Blue  3x10 Blue  3x10 Blue  3x12 Blue  3x15 Blue  3x15    R TB ER 90/90       nv R/  3x10 45 deg  R/  3x10    R TB ER G/  3x15 G/  3x15 G/  3x15 G/  3x15 Blue  3x10 " Blue  3x10 Blue  3x12 np     Supine t/s extension SNAGs 1x10 1x10 1x10 1x10 1x10 HEP                                              Ther Activity                                       Gait Training                                       Modalities

## 2023-05-25 ENCOUNTER — OFFICE VISIT (OUTPATIENT)
Dept: PHYSICAL THERAPY | Facility: CLINIC | Age: 78
End: 2023-05-25

## 2023-05-25 DIAGNOSIS — M25.512 ACUTE PAIN OF LEFT SHOULDER: Primary | ICD-10-CM

## 2023-05-25 DIAGNOSIS — M25.511 ACUTE PAIN OF RIGHT SHOULDER: ICD-10-CM

## 2023-05-25 NOTE — PROGRESS NOTES
"Daily Note     Today's date: 2023  Patient name: Mando Geiger  : 1945  MRN: [id number]  Referring provider: Jennifer Rodrigez MD  Dx:   Encounter Diagnosis     ICD-10-CM    1  Acute pain of left shoulder  M25 512       2  Acute pain of right shoulder  M25 511           Start Time: 1000  Stop Time: 1040  Total time in clinic (min): 40 minutes    Subjective: Patient reports, \"8/10\" B/L shoulder pain prior to therapy and \"6/10\" R shoulder and \"4/10\" L shoulder pain following therapy  Objective: See treatment diary below    Assessment: Good response to repeated FF FROM noting decreased pain and improved flexion and abd AROM  Tactile cueing required for proper set up of R shoulder TB ER at 45°  Plan: Assess response to repeated FE nv      Precautions:  PMHx: HTN, CKD II  Dx: L subacromial impingement, possible RTC tear; acute R RTC strain    Manuals    Laser R subacromial space 5000J 5000J 5000J 5000J 5000J 5000J 3000J 3000J 3000J 3000J   Laser L subacromial space 3000J 3000J 3000J 3000J 3000J 3000J 3000J 3000J 3000J 3000J   L ABD MWM       2x10 R/  2x10 B/  2x10 ea    PROM FF only          20x ea                                          Neuro Re-Ed             Seated t/s extension 1x10 1x10 1x10 3x10 3x10 3x10 3x10 3x10 3x10 3x10   C/s retraction OP 1x10 1x10 1x10 3x10 3x10 3x10 3x10 3x10 3x10 3x10   C/s retraction OP extension 1x10 1x10 1x10 3x10 3x10 3x10 3x10 3x10 3x10 3x10   Standing shoulder extension palms back R/  3x10  L/  1x10 3x10 ea 3x10 ea 3x10 ea   B/  5x10 R/  5x10 R/  3x10 hold   Sustained shoulder extension     B/  3x1 min B/  3x1 min  L/  3x1 min L/  3x1 min hold   Prone T's 3x10 3x10 3x15 3x15         B TB T's     Y/  3x10 Y/  3x10 Y/  3x15 Y/  3x15 Y/  3x15 Y/  3x15   FF FROM          B/  1x10   ABD FROM          B/  1x10                             Ther Ex             Pulleys: FF only          1x10   L TB ER 90/90 G/  3x15 " G/  3x15 Blue  3x10 Blue  3x10 Blue  3x10 Blue  3x10 Blue  3x12 Blue  3x15 Blue  3x15 Blue  3x15   R TB ER 90/90       nv R/  3x10 45 deg  R/  3x10 45 deg  R/  3x10   R TB ER G/  3x15 G/  3x15 G/  3x15 G/  3x15 Blue  3x10 Blue  3x10 Blue  3x12 np     Supine t/s extension SNAGs 1x10 1x10 1x10 1x10 1x10 HEP       AAROM cane FF          1x20 ea                             Ther Activity                                       Gait Training                                       Modalities

## 2023-05-30 ENCOUNTER — OFFICE VISIT (OUTPATIENT)
Dept: PHYSICAL THERAPY | Facility: CLINIC | Age: 78
End: 2023-05-30

## 2023-05-30 DIAGNOSIS — M25.512 ACUTE PAIN OF LEFT SHOULDER: Primary | ICD-10-CM

## 2023-05-30 DIAGNOSIS — M25.511 ACUTE PAIN OF RIGHT SHOULDER: ICD-10-CM

## 2023-05-30 NOTE — PROGRESS NOTES
Daily Note     Today's date: 2023  Patient name: Anne Mercer  : 1945  MRN: [id number]  Referring provider: Kerline Montero MD  Dx:   Encounter Diagnosis     ICD-10-CM    1  Acute pain of left shoulder  M25 512       2  Acute pain of right shoulder  M25 511                      Subjective: Pt reports mod R shoulder soreness after using a leaf blower yesterday but otherwise improved pain with OH reaching since changing to repeated FF ROM  Objective: See treatment diary below    Assessment: Pt demonstrated improved pain, ROM, and arthrokinematics following repeated shoulder FF but still with (+) painful arc intermittently outside of the sagittal plane  Plan: Cont  POC  Precautions:  PMHx: HTN, CKD II  Dx: L subacromial impingement, possible RTC tear; acute R RTC strain    Manuals    Laser R subacromial space 5000J 5000J 5000J 5000J 5000J 5000J 3000J 3000J 3000J 3000J   Laser L subacromial space 3000J         3000J   B MWM             PROM FF only          20x ea                                          Neuro Re-Ed             Seated t/s extension 3x10         3x10   C/s retraction OP 3x10         3x10   C/s retraction OP extension 3x10         3x10                                          B TB T's Y/  3x15 Y/  4x12        Y/  3x15   FF AAROM B/  3x10            FF FROM          B/  1x10   ABD FROM          B/  1x10                             Ther Ex             Pulleys: FF only B/  3x10         1x10   L TB ER 90/90 Blue/  3x15         Blue  3x15   R TB ER 45 deg scaption nv Y/  3x10        45 deg  R/  3x10   R TB ER G/  3x15            Supine t/s extension SNAGs             AAROM cane FF          1x20 ea                             Ther Activity                                       Gait Training                                       Modalities

## 2023-06-01 ENCOUNTER — EVALUATION (OUTPATIENT)
Dept: PHYSICAL THERAPY | Facility: CLINIC | Age: 78
End: 2023-06-01

## 2023-06-01 DIAGNOSIS — M25.512 ACUTE PAIN OF LEFT SHOULDER: Primary | ICD-10-CM

## 2023-06-01 DIAGNOSIS — M25.511 ACUTE PAIN OF RIGHT SHOULDER: ICD-10-CM

## 2023-06-01 NOTE — PROGRESS NOTES
Daily Note     Today's date: 2023  Patient name: Jesús Levi  : 1945  MRN: [id number]  Referring provider: Eliane Nieto MD  Dx:   Encounter Diagnosis     ICD-10-CM    1  Acute pain of left shoulder  M25 512       2  Acute pain of right shoulder  M25 511                      Subjective: Pt reports improved no L shoulder pain and improved R shoulder pain with after repeated FF  Objective: See treatment diary below    Assessment: Pt demonstrated improved L>R ROM, arthrokinematics, and pain  Pt demonstrated improved tolerance to R RTC strengthening  Plan: Cont  POC  Precautions:  PMHx: HTN, CKD II  Dx: L subacromial impingement, possible RTC tear; acute R RTC strain    Manuals            Laser R subacromial space 5000J 3000J           Laser L subacromial space 3000J 3000J           B MWM  R/  2x10           PROM FF only                                                    Neuro Re-Ed             Seated t/s extension 3x10 3x10           C/s retraction OP 3x10 3x10           C/s retraction OP extension 3x10 3x10                                                  B TB T's Y/  3x15 R/  3x10           FF AAROM B/  3x10 B/  3x10           FF FROM             ABD FROM             FF AAROM ecc lowering  B/  1x10                        Ther Ex             Pulleys: FF only B/  3x10 B/  3x10           L TB ER 90/90 Blue/  3x15 Blue  3x15 Black  3x10          R TB ER 45 deg scaption nv Y/  3x10           R TB ER G/  3x15                                                                Ther Activity                                       Gait Training                                       Modalities

## 2023-06-06 ENCOUNTER — OFFICE VISIT (OUTPATIENT)
Dept: PHYSICAL THERAPY | Facility: CLINIC | Age: 78
End: 2023-06-06
Payer: MEDICARE

## 2023-06-06 DIAGNOSIS — M25.512 ACUTE PAIN OF LEFT SHOULDER: Primary | ICD-10-CM

## 2023-06-06 PROCEDURE — 97110 THERAPEUTIC EXERCISES: CPT | Performed by: PHYSICAL THERAPIST

## 2023-06-06 PROCEDURE — 97112 NEUROMUSCULAR REEDUCATION: CPT | Performed by: PHYSICAL THERAPIST

## 2023-06-06 NOTE — PROGRESS NOTES
PT Re-Evaluation     Today's date: 2023  Patient name: Glorious Eisenmenger  : 1945  MRN: [id number]  Referring provider: Анна Hsieh MD  Dx:   Encounter Diagnosis     ICD-10-CM    1  Acute pain of left shoulder  M25 512                      Assessment  Assessment details: Pt demonstrates a resolution of pain at rest and improved--but not resolved--B shoulder pain, ROM, and arthrokinematics indicating a resolution of his B shoulder derangement  He continues to demonstrate pain with end ROM ABD > FF secondary to R RTC weakness, scapular instability, and not-yet normalized arthrokinematics  He will continue to benefit from skilled PT services to address his remaining impairments in order to further improve pain and function  Impairments: abnormal muscle firing, abnormal or restricted ROM, abnormal movement, activity intolerance, impaired physical strength, lacks appropriate home exercise program, pain with function, poor posture  and poor body mechanics  Understanding of Dx/Px/POC: good   Prognosis: good    Goals  STG - 4 wks  1) pt will demonstrate 90 deg active FE (met)  2) pt will improve pain 50% (met)    LTG - 8-12 wks  1) pt will demonstrate 135 deg active FE (met)  2) pt will improve pain 90% (not met)    Plan  Planned modality interventions: low level laser therapy  Planned therapy interventions: joint mobilization, manual therapy, body mechanics training, neuromuscular re-education, strengthening, stretching, patient education, postural training, therapeutic activities, therapeutic exercise, home exercise program, functional ROM exercises and flexibility  Frequency: 2x week  Duration in weeks: 6  Treatment plan discussed with: patient        Subjective Evaluation    History of Present Illness  Mechanism of injury: Pt is a 68 y o  male with PMHx significant for HTN, CKD II   He reports a resolution of B shoulder pain at rest and 4/10 brief, sharp pain on the L and achy pain on the R with active FE  Pain  Current pain ratin  At best pain ratin  At worst pain ratin  Location: anterior R shoulder, upper arm  Quality: dull ache and sharp  Relieving factors: rest  Aggravating factors: overhead activity and lifting  Progression: improved    Hand dominance: right      Diagnostic Tests  No diagnostic tests performed  Treatments  Previous treatment: physical therapy  Current treatment: physical therapy  Patient Goals  Patient goals for therapy: decreased pain, increased motion, return to sport/leisure activities and increased strength          Objective     Postural Observations  Seated posture: poor  Standing posture: poor  Correction of posture: makes symptoms better        Active Range of Motion   Left Shoulder   Flexion: 145 degrees   Extension: 20 degrees   Abduction: 145 degrees with pain  External rotation BTH: C7   Internal rotation BTB: T11     Right Shoulder   Flexion: 145 degrees with pain  Extension: 25 degrees   Abduction: 140 degrees with pain    Passive Range of Motion   Left Shoulder   Flexion: 155 degrees   Abduction: 155 degrees   External rotation 90°: 80 degrees   Internal rotation 90°: 60 degrees     Right Shoulder   Flexion: 150 degrees with pain  Abduction: 150 degrees   External rotation 90°: 80 degrees   Internal rotation 90°: 40 degrees     Joint Play   Left Shoulder  Hypomobile in the posterior capsule and inferior capsule  Right Shoulder  Joints within functional limits are the posterior capsule and inferior capsule     Mechanical Assessment    Cervical    Seated retraction: repeated movements   Pain location: no change  Pain intensity: better  Pain level: decreased  Seated Extension: repeated movements  Pain location: no change  Pain intensity: better  Pain level: decreased    Thoracic    Seated extension: repeated movements  Pain location: no change  Pain intensity: better  Pain level: decreased    Lumbar      Strength/Myotome Testing     Left Shoulder     Planes of Motion   Flexion: 4+   Abduction: 4+   External rotation at 0°: 4+ (improved with postural correction)   External rotation at 90°: 4+   Internal rotation at 0°: 4+   Internal rotation at 90°: 4+     Right Shoulder     Planes of Motion   Flexion: 4-   Abduction: 4   External rotation at 0°: 4   External rotation at 90°: 4-   Internal rotation at 0°: 4+   Internal rotation at 90°: 4-     Tests     Left Shoulder   Positive Hawkin's  Negative apprehension, drop arm, external rotation lag sign, Neer's and painful arc  Right Shoulder   Negative drop arm and external rotation lag sign  Precautions:  PMHx: HTN, CKD II  Dx: L subacromial impingement, possible RTC tear; acute R RTC strain    Manuals 6/6            Laser R subacromial space 3000J            Laser L subacromial space 3000J            FF MWM R/  1x10            ABD MWM R/  1x10                                                   Neuro Re-Ed             Seated t/s extension 1x10  HEP            C/s retraction OP 1x10  HEP            C/s retraction OP extension 1x10  HEP            FF AAROM B/  2x10            FF AAROM ecc lowering B/  1x10                         ABD AAROM L/  1x10            ABD AAROM ecc lowering L/  1x10            Prone I's U/  3x10            B TB T's R/  3x10            Prone Y's  U/  2x10                                                  Ther Ex             Pulleys: FF B/  3x10            Pulleys: ABD nv B/  1x10           L TB ER 90 ABD Black  3x10                         R TB ER 45 ABD R/  3x10                                                                Ther Activity                                       Gait Training                                       Modalities

## 2023-06-08 ENCOUNTER — OFFICE VISIT (OUTPATIENT)
Dept: PHYSICAL THERAPY | Facility: CLINIC | Age: 78
End: 2023-06-08
Payer: MEDICARE

## 2023-06-08 DIAGNOSIS — M25.511 ACUTE PAIN OF RIGHT SHOULDER: ICD-10-CM

## 2023-06-08 DIAGNOSIS — M25.512 ACUTE PAIN OF LEFT SHOULDER: Primary | ICD-10-CM

## 2023-06-08 PROCEDURE — 97110 THERAPEUTIC EXERCISES: CPT

## 2023-06-08 PROCEDURE — 97140 MANUAL THERAPY 1/> REGIONS: CPT

## 2023-06-08 PROCEDURE — 97112 NEUROMUSCULAR REEDUCATION: CPT

## 2023-06-08 NOTE — PROGRESS NOTES
Daily Note     Today's date: 2023  Patient name: Marilou Cali  : 1945  MRN: [id number]  Referring provider: Fortunato Klein MD  Dx:   Encounter Diagnosis     ICD-10-CM    1  Acute pain of left shoulder  M25 512       2  Acute pain of right shoulder  M25 511                      Subjective: Pt reports a little bit more left shoulder pain after last treatment session  Objective: See treatment diary below      Assessment: Pt was able to progress scapular stabilization but had difficulty externally rotating his shoulders with prone Y's and I's  Decreased pain levels noted post tx  Plan: Continue poc as per PT  Precautions:  PMHx: HTN, CKD II  Dx: L subacromial impingement, possible RTC tear; acute R RTC strain    Manuals            Laser R subacromial space 3000J 3000J           Laser L subacromial space 3000J 3000J           FF MWM R/  1x10            ABD MWM R/  1x10                                                   Neuro Re-Ed             Seated t/s extension 1x10  HEP            C/s retraction OP 1x10  HEP            C/s retraction OP extension 1x10  HEP            FF AAROM B/  2x10 B/ 2x10           FF AAROM ecc lowering B/  1x10 B/  2x10                        ABD AAROM L/  1x10 L/ 1x10           ABD AAROM ecc lowering L/  1x10 L/ 1x10           Prone I's U/  3x10 U/   3x10           B TB T's R/  3x10 R/   3x10           Prone Y's  U/  2x10                                                  Ther Ex             Pulleys: FF B/  3x10 B/  3x10           Pulleys: ABD nv B/  1x10           L TB ER 90 ABD Black  3x10 Blue  3x10                        R TB ER 45 ABD R/  3x10 R/   3x10                                                               Ther Activity                                       Gait Training                                       Modalities

## 2023-06-13 ENCOUNTER — OFFICE VISIT (OUTPATIENT)
Dept: PHYSICAL THERAPY | Facility: CLINIC | Age: 78
End: 2023-06-13
Payer: MEDICARE

## 2023-06-13 DIAGNOSIS — M25.512 ACUTE PAIN OF LEFT SHOULDER: Primary | ICD-10-CM

## 2023-06-13 DIAGNOSIS — M25.511 ACUTE PAIN OF RIGHT SHOULDER: ICD-10-CM

## 2023-06-13 PROCEDURE — 97110 THERAPEUTIC EXERCISES: CPT

## 2023-06-13 PROCEDURE — 97112 NEUROMUSCULAR REEDUCATION: CPT

## 2023-06-13 NOTE — PROGRESS NOTES
Daily Note     Today's date: 2023  Patient name: Modesta Richard  : 1945  MRN: [id number]  Referring provider: Seda Hawthorne MD  Dx:   Encounter Diagnosis     ICD-10-CM    1  Acute pain of left shoulder  M25 512       2  Acute pain of right shoulder  M25 511           Start Time: 1030  Stop Time: 1120  Total time in clinic (min): 50 minutes    Subjective: Patient reports no issues with the R shoulder other than fatigue  Reports that the L shoulder had one moment of muscle pain and it's been sore t/o the weekend  B/L active motion has been good  Objective: See treatment diary below    Assessment: Patient requires tactile cueing to decrease UT dominance and properly facilitate scapular contraction  No increased pain t/o entire session  Plan: Continue poc as per PT  Precautions:  PMHx: HTN, CKD II  Dx: L subacromial impingement, possible RTC tear; acute R RTC strain    Manuals           Laser R subacromial space 3000J 3000J 3000J          Laser L subacromial space 3000J 3000J 3000J          FF MWM R/  1x10            ABD MWM R/  1x10                                                   Neuro Re-Ed             Seated t/s extension 1x10  HEP            C/s retraction OP 1x10  HEP            C/s retraction OP extension 1x10  HEP            FF AAROM B/  2x10 B/ 2x10 B/ 2x10          FF AAROM ecc lowering B/  1x10 B/  2x10 B/ 2x10                       ABD AAROM L/  1x10 L/ 1x10 L/ 1x10          ABD AAROM ecc lowering L/  1x10 L/ 1x10 L/ 1x10          Prone I's U/  3x10 U/   3x10 U/  2x10          B TB T's R/  3x10 R/   3x10 R/   3x10          Prone Y's  U/  2x10 U/  2x10                                                 Ther Ex             Pulleys: FF B/  3x10 B/  3x10 B/  3x10          Pulleys: ABD nv B/  1x10 B/  1x10          L TB ER 90 ABD Black  3x10 Blue  3x10 Blue  2x10                       R TB ER 45 ABD R/  3x10 R/   3x10 R/   3x10 Ther Activity                                       Gait Training                                       Modalities

## 2023-06-15 ENCOUNTER — OFFICE VISIT (OUTPATIENT)
Dept: PHYSICAL THERAPY | Facility: CLINIC | Age: 78
End: 2023-06-15
Payer: MEDICARE

## 2023-06-15 DIAGNOSIS — M25.512 ACUTE PAIN OF LEFT SHOULDER: Primary | ICD-10-CM

## 2023-06-15 DIAGNOSIS — M25.511 ACUTE PAIN OF RIGHT SHOULDER: ICD-10-CM

## 2023-06-15 PROCEDURE — 97110 THERAPEUTIC EXERCISES: CPT

## 2023-06-15 PROCEDURE — 97140 MANUAL THERAPY 1/> REGIONS: CPT

## 2023-06-15 PROCEDURE — 97112 NEUROMUSCULAR REEDUCATION: CPT

## 2023-06-15 NOTE — PROGRESS NOTES
Daily Note     Today's date: 6/15/2023  Patient name: Gentry Stein  : 1945  MRN: [id number]  Referring provider: Daniel Dotson MD  Dx:   Encounter Diagnosis     ICD-10-CM    1  Acute pain of left shoulder  M25 512       2  Acute pain of right shoulder  M25 511           Start Time: 1000  Stop Time: 1050  Total time in clinic (min): 50 minutes    Subjective: Patient reports no issues with his B/L shoulder since last visit  Very slight L shoulder soreness  Objective: See treatment diary below    Assessment: Patient requires tactile cueing to decrease UT dominance and properly facilitate scapular contraction  One moment of sharp pain on the L shoulder which was eliminated with form correction  Plan: Continue poc as per PT  Precautions:  PMHx: HTN, CKD II  Dx: L subacromial impingement, possible RTC tear; acute R RTC strain    Manuals 6/6 6/8 6/13 6/15         Laser R subacromial space 3000J 3000J 3000J 3000J         Laser L subacromial space 3000J 3000J 3000J 3000J         FF MWM R/  1x10            ABD MWM R/  1x10                                                   Neuro Re-Ed             Seated t/s extension 1x10  HEP            C/s retraction OP 1x10  HEP            C/s retraction OP extension 1x10  HEP            FF AAROM B/  2x10 B/ 2x10 B/ 2x10 B/ 2x10         FF AAROM ecc lowering B/  1x10 B/  2x10 B/ 2x10 B/ 2x10                      ABD AAROM L/  1x10 L/ 1x10 L/ 1x10 L/ 1x10         ABD AAROM ecc lowering L/  1x10 L/ 1x10 L/ 1x10 L/ 1x10         Prone I's U/  3x10 U/   3x10 U/  2x10 U/  2x10         B TB T's R/  3x10 R/   3x10 R/   3x10 R/   3x10         Prone Y's  U/  2x10 U/  2x10 U/  2x10                                                Ther Ex             Pulleys: FF B/  3x10 B/  3x10 B/  3x10 B/  3x10         Pulleys: ABD nv B/  1x10 B/  1x10 B/  1x10         L TB ER 90 ABD Black  3x10 Blue  3x10 Blue  2x10 Blue  2x10                      R TB ER 45 ABD R/  3x10 R/   3x10 R/ 3x10 R/   3x10                                                             Ther Activity                                       Gait Training                                       Modalities

## 2023-06-17 DIAGNOSIS — H81.319 AUDITORY VERTIGO, UNSPECIFIED LATERALITY: ICD-10-CM

## 2023-06-17 RX ORDER — TRIAMTERENE AND HYDROCHLOROTHIAZIDE 37.5; 25 MG/1; MG/1
CAPSULE ORAL
Qty: 90 CAPSULE | Refills: 1 | Status: SHIPPED | OUTPATIENT
Start: 2023-06-17

## 2023-06-20 ENCOUNTER — OFFICE VISIT (OUTPATIENT)
Dept: PHYSICAL THERAPY | Facility: CLINIC | Age: 78
End: 2023-06-20
Payer: MEDICARE

## 2023-06-20 DIAGNOSIS — M25.511 ACUTE PAIN OF RIGHT SHOULDER: ICD-10-CM

## 2023-06-20 DIAGNOSIS — M25.512 ACUTE PAIN OF LEFT SHOULDER: Primary | ICD-10-CM

## 2023-06-20 PROCEDURE — 97112 NEUROMUSCULAR REEDUCATION: CPT | Performed by: PHYSICAL THERAPIST

## 2023-06-20 PROCEDURE — 97110 THERAPEUTIC EXERCISES: CPT | Performed by: PHYSICAL THERAPIST

## 2023-06-20 PROCEDURE — 97140 MANUAL THERAPY 1/> REGIONS: CPT | Performed by: PHYSICAL THERAPIST

## 2023-06-20 NOTE — PROGRESS NOTES
Daily Note     Today's date: 2023  Patient name: Shelley Arteaga  : 1945  MRN: [id number]  Referring provider: Jose Silvestre MD  Dx:   Encounter Diagnosis     ICD-10-CM    1  Acute pain of left shoulder  M25 512       2  Acute pain of right shoulder  M25 511                      Subjective: Pt reports mild B UT pain at rest, R shoulder fatigue and intermittent L shoulder painful arc with OH reaching  Objective: See treatment diary below  Updated HEP to c/s extension, UT stretch, t/s extension 3xD q D and AAROM/strengthening 1xD q other day    Assessment: Pt demonstrated B FROM and an abolition of pain following L>R UT Graston and scap stab indicative of UT-dominant movement patterns with OH reaching  Plan: Assess response to HEP changes nv  Precautions:  PMHx: HTN, CKD II  Dx: L subacromial impingement, possible RTC tear; acute R RTC strain    Manuals  6/8 6/13 6/15 6/20        Laser R subacromial space 3000J 3000J 3000J 3000J         Laser L subacromial space 3000J 3000J 3000J 3000J         FF MWM R/  1x10            ABD MWM R/  1x10            B UT Graston     6 min        Gr IV T1-7 PA mobs in prone     1x30 ea                     Neuro Re-Ed             Seated t/s extension 1x10  HEP            C/s retraction OP 1x10  HEP            C/s retraction OP extension 1x10  HEP    2x10        FF AAROM B/  2x10 B/ 2x10 B/ 2x10 B/ 2x10 B/  1x10        FF AAROM ecc lowering B/  1x10 B/  2x10 B/ 2x10 B/ 2x10 B/  1x10                     ABD AAROM L/  1x10 L/ 1x10 L/ 1x10 L/ 1x10 B/  1x10        ABD AAROM ecc lowering L/  1x10 L/ 1x10 L/ 1x10 L/ 1x10 B/  1x10        Prone I's U/  3x10 U/   3x10 U/  2x10 U/  2x10         B TB T's R/  3x10 R/   3x10 R/   3x10 R/   3x10 R/  3x12        Prone Y's  U/  2x10 U/  2x10 U/  2x10 U/  2x10                                               Ther Ex             Pulleys: FF B/  3x10 B/  3x10 B/  3x10 B/  3x10         Pulleys: ABD nv B/  1x10 B/  1x10 "B/  1x10         L TB ER 90 ABD Black  3x10 Blue  3x10 Blue  2x10 Blue  2x10 Blue  3x10                     R TB ER 45 ABD R/  3x10 R/   3x10 R/   3x10 R/   3x10         R TB ER 90 ABD     Y/  3x10        L UT stretch     10\"x3                                  Ther Activity                                       Gait Training                                       Modalities                                          "

## 2023-06-22 ENCOUNTER — OFFICE VISIT (OUTPATIENT)
Dept: PHYSICAL THERAPY | Facility: CLINIC | Age: 78
End: 2023-06-22
Payer: MEDICARE

## 2023-06-22 DIAGNOSIS — M25.512 ACUTE PAIN OF LEFT SHOULDER: Primary | ICD-10-CM

## 2023-06-22 DIAGNOSIS — M25.511 ACUTE PAIN OF RIGHT SHOULDER: ICD-10-CM

## 2023-06-22 PROCEDURE — 97112 NEUROMUSCULAR REEDUCATION: CPT | Performed by: PHYSICAL THERAPIST

## 2023-06-22 PROCEDURE — 97140 MANUAL THERAPY 1/> REGIONS: CPT | Performed by: PHYSICAL THERAPIST

## 2023-06-22 PROCEDURE — 97110 THERAPEUTIC EXERCISES: CPT | Performed by: PHYSICAL THERAPIST

## 2023-06-22 NOTE — PROGRESS NOTES
Daily Note     Today's date: 2023  Patient name: Mike Murrell  : 1945  MRN: [id number]  Referring provider: Florin Sanches MD  Dx:   Encounter Diagnosis     ICD-10-CM    1  Acute pain of left shoulder  M25 512       2  Acute pain of right shoulder  M25 511                      Subjective: Pt reports significantly improved B UT pain at rest and less difficulty sleeping and improved R>L shoulder pain with OH reaching  Objective: See treatment diary below    Assessment: Pt demonstrated improved scapular stability with cueing to limit scapular elevation during TE  Pt demonstrates a positive response to addressing impairments of UT-dominant movement patterns and abnormal cervical/thoracic ROM and mechanics  Plan: Cont  POC  Precautions:  PMHx: HTN, CKD II  Dx: L subacromial impingement, possible RTC tear; acute R RTC strain    Manuals 6/6 6/8 6/13 6/15 6/20 6/22       Laser R subacromial space 3000J 3000J 3000J 3000J         Laser L subacromial space 3000J 3000J 3000J 3000J         FF MWM R/  1x10            ABD MWM R/  1x10            B UT Graston     6 min 5 min       Gr IV T1-7 PA mobs in prone     1x30 ea 1x40 ea                    Neuro Re-Ed             Seated t/s extension 1x10  HEP            C/s retraction OP 1x10  HEP            C/s retraction OP extension 1x10  HEP    2x10 3x10       FF AAROM B/  2x10 B/ 2x10 B/ 2x10 B/ 2x10 B/  1x10 B/  1x10       FF AAROM ecc lowering B/  1x10 B/  2x10 B/ 2x10 B/ 2x10 B/  1x10 B/  1x10                    ABD AAROM L/  1x10 L/ 1x10 L/ 1x10 L/ 1x10 B/  1x10 B/  1x10       ABD AAROM ecc lowering L/  1x10 L/ 1x10 L/ 1x10 L/ 1x10 B/  1x10 B/  1x10       Prone I's U/  3x10 U/   3x10 U/  2x10 U/  2x10         B TB T's R/  3x10 R/   3x10 R/   3x10 R/   3x10 R/  3x12 R/  3x10       Prone Y's  U/  2x10 U/  2x10 U/  2x10 U/  2x10 U/  2x10 U/  3x10                                             Ther Ex             Pulleys: FF B/  3x10 B/  3x10 B/  3x10 "B/  3x10         Pulleys: ABD nv B/  1x10 B/  1x10 B/  1x10         L TB ER 90 ABD Black  3x10 Blue  3x10 Blue  2x10 Blue  2x10 Blue  3x10 Blue  3x10                    R TB ER 45 ABD R/  3x10 R/   3x10 R/   3x10 R/   3x10         R TB ER 90 ABD     Y/  3x10 Y/  3x10       L UT stretch     10\"x3 10\"x3                                 Ther Activity                                       Gait Training                                       Modalities                                          "

## 2023-06-27 ENCOUNTER — OFFICE VISIT (OUTPATIENT)
Dept: PHYSICAL THERAPY | Facility: CLINIC | Age: 78
End: 2023-06-27
Payer: MEDICARE

## 2023-06-27 DIAGNOSIS — M25.511 ACUTE PAIN OF RIGHT SHOULDER: ICD-10-CM

## 2023-06-27 DIAGNOSIS — M25.512 ACUTE PAIN OF LEFT SHOULDER: Primary | ICD-10-CM

## 2023-06-27 PROCEDURE — 97110 THERAPEUTIC EXERCISES: CPT

## 2023-06-27 PROCEDURE — 97112 NEUROMUSCULAR REEDUCATION: CPT

## 2023-06-27 PROCEDURE — 97140 MANUAL THERAPY 1/> REGIONS: CPT

## 2023-06-27 NOTE — PROGRESS NOTES
"Daily Note     Today's date: 2023  Patient name: Karma Plummer  : 1945  MRN: [id number]  Referring provider: Yamel West MD  Dx:   Encounter Diagnosis     ICD-10-CM    1  Acute pain of left shoulder  M25 512       2  Acute pain of right shoulder  M25 511           Start Time: 1000  Stop Time: 1050  Total time in clinic (min): 50 minutes    Subjective: Patient states, \"Today is the best I've felt since starting therapy\"  He reports very few instances of discomfort over the weekend  Notes really good improvements with current exercise routine  Objective: See treatment diary below    Assessment: Pt demonstrates a positive response to addressing impairments of UT-dominant movement patterns and abnormal cervical/thoracic ROM and mechanics  Plan: Cont  POC  Precautions:  PMHx: HTN, CKD II  Dx: L subacromial impingement, possible RTC tear; acute R RTC strain    Manuals 6/6 6/8 6/13 6/15 6/20 6/22 6/27      Laser R subacromial space 3000J 3000J 3000J 3000J         Laser L subacromial space 3000J 3000J 3000J 3000J         FF MWM R/  1x10            ABD MWM R/  1x10            B UT Graston     6 min 5 min 5 min      Gr IV T1-7 PA mobs in prone     1x30 ea 1x40 ea 1x40 ea  KF                   Neuro Re-Ed             Seated t/s extension 1x10  HEP            C/s retraction OP 1x10  HEP            C/s retraction OP extension 1x10  HEP    2x10 3x10 3x10      FF AAROM B/  2x10 B/ 2x10 B/ 2x10 B/ 2x10 B/  1x10 B/  1x10 B/  1x10      FF AAROM ecc lowering B/  1x10 B/  2x10 B/ 2x10 B/ 2x10 B/  1x10 B/  1x10 B/  1x10                   ABD AAROM L/  1x10 L/ 1x10 L/ 1x10 L/ 1x10 B/  1x10 B/  1x10 B/  1x10      ABD AAROM ecc lowering L/  1x10 L/ 1x10 L/ 1x10 L/ 1x10 B/  1x10 B/  1x10 B/  1x10      Prone I's U/  3x10 U/   3x10 U/  2x10 U/  2x10         B TB T's R/  3x10 R/   3x10 R/   3x10 R/   3x10 R/  3x12 R/  3x10 R/  3x10      Prone Y's  U/  2x10 U/  2x10 U/  2x10 U/  2x10 U/  2x10 U/  3x10        " "                                     Ther Ex             Pulleys: FF B/  3x10 B/  3x10 B/  3x10 B/  3x10         Pulleys: ABD nv B/  1x10 B/  1x10 B/  1x10         L TB ER 90 ABD Black  3x10 Blue  3x10 Blue  2x10 Blue  2x10 Blue  3x10 Blue  3x10 Blue  3x10                   R TB ER 45 ABD R/  3x10 R/   3x10 R/   3x10 R/   3x10         R TB ER 90 ABD     Y/  3x10 Y/  3x10 Y/  3x10      L UT stretch     10\"x3 10\"x3 10\"x3                                Ther Activity                                       Gait Training                                       Modalities                                          "

## 2023-06-29 ENCOUNTER — OFFICE VISIT (OUTPATIENT)
Dept: PHYSICAL THERAPY | Facility: CLINIC | Age: 78
End: 2023-06-29
Payer: MEDICARE

## 2023-06-29 DIAGNOSIS — M25.512 ACUTE PAIN OF LEFT SHOULDER: Primary | ICD-10-CM

## 2023-06-29 DIAGNOSIS — M25.511 ACUTE PAIN OF RIGHT SHOULDER: ICD-10-CM

## 2023-06-29 PROCEDURE — 97112 NEUROMUSCULAR REEDUCATION: CPT

## 2023-06-29 PROCEDURE — 97110 THERAPEUTIC EXERCISES: CPT

## 2023-06-29 NOTE — PROGRESS NOTES
"Daily Note     Today's date: 2023  Patient name: Marlene Driscoll  : 1945  MRN: [id number]  Referring provider: Maxine Diaz MD  Dx:   Encounter Diagnosis     ICD-10-CM    1  Acute pain of left shoulder  M25 512       2  Acute pain of right shoulder  M25 511           Start Time: 1000  Stop Time: 1050  Total time in clinic (min): 50 minutes    Subjective: Patient states, \"I was able to clean the tiles in my shower with no issue\"  Objective: See treatment diary below    Assessment: Pt demonstrates a positive response to addressing impairments of UT-dominant movement patterns and abnormal cervical/thoracic ROM and mechanics  He does require occasional cueing to decrease UT comp  Plan: Cont  POC  Precautions:  PMHx: HTN, CKD II  Dx: L subacromial impingement, possible RTC tear; acute R RTC strain    Manuals 6/6 6/8 6/13 6/15 6/20 6/22 6/27 6/29     Laser R subacromial space 3000J 3000J 3000J 3000J         Laser L subacromial space 3000J 3000J 3000J 3000J         FF MWM R/  1x10            ABD MWM R/  1x10            B UT Graston     6 min 5 min 5 min 5 min     Gr IV T1-7 PA mobs in prone     1x30 ea 1x40 ea 1x40 ea  KF 1x40 ea  KF                  Neuro Re-Ed             Seated t/s extension 1x10  HEP            C/s retraction OP 1x10  HEP            C/s retraction OP extension 1x10  HEP    2x10 3x10 3x10 3x10     FF AAROM B/  2x10 B/ 2x10 B/ 2x10 B/ 2x10 B/  1x10 B/  1x10 B/  1x10 B/  1x10     FF AAROM ecc lowering B/  1x10 B/  2x10 B/ 2x10 B/ 2x10 B/  1x10 B/  1x10 B/  1x10 B/  1x10                  ABD AAROM L/  1x10 L/ 1x10 L/ 1x10 L/ 1x10 B/  1x10 B/  1x10 B/  1x10 B/  1x10     ABD AAROM ecc lowering L/  1x10 L/ 1x10 L/ 1x10 L/ 1x10 B/  1x10 B/  1x10 B/  1x10 B/  1x10     Prone I's U/  3x10 U/   3x10 U/  2x10 U/  2x10         B TB T's R/  3x10 R/   3x10 R/   3x10 R/   3x10 R/  3x12 R/  3x10 R/  3x10 R/  3x10     Prone Y's  U/  2x10 U/  2x10 U/  2x10 U/  2x10 U/  2x10 U/  3x10 " "U/  3x10                                            Ther Ex             Pulleys: FF B/  3x10 B/  3x10 B/  3x10 B/  3x10         Pulleys: ABD nv B/  1x10 B/  1x10 B/  1x10         L TB ER 90 ABD Black  3x10 Blue  3x10 Blue  2x10 Blue  2x10 Blue  3x10 Blue  3x10 Blue  3x10 Blue  3x10                  R TB ER 45 ABD R/  3x10 R/   3x10 R/   3x10 R/   3x10         R TB ER 90 ABD     Y/  3x10 Y/  3x10 Y/  3x10 Y/  3x10     L UT stretch     10\"x3 10\"x3 10\"x3 10\"x3                               Ther Activity                                       Gait Training                                       Modalities                                          "

## 2023-07-11 ENCOUNTER — OFFICE VISIT (OUTPATIENT)
Dept: PHYSICAL THERAPY | Facility: CLINIC | Age: 78
End: 2023-07-11
Payer: MEDICARE

## 2023-07-11 DIAGNOSIS — M25.511 ACUTE PAIN OF RIGHT SHOULDER: Primary | ICD-10-CM

## 2023-07-11 DIAGNOSIS — M25.512 ACUTE PAIN OF LEFT SHOULDER: ICD-10-CM

## 2023-07-11 PROCEDURE — 97140 MANUAL THERAPY 1/> REGIONS: CPT | Performed by: PHYSICAL THERAPIST

## 2023-07-11 PROCEDURE — 97112 NEUROMUSCULAR REEDUCATION: CPT | Performed by: PHYSICAL THERAPIST

## 2023-07-11 PROCEDURE — 97110 THERAPEUTIC EXERCISES: CPT | Performed by: PHYSICAL THERAPIST

## 2023-07-11 NOTE — PROGRESS NOTES
Daily Note     Today's date: 2023  Patient name: Geoff Salguero  : 1945  MRN: [id number]  Referring provider: Lakeisha Ragland MD  Dx:   Encounter Diagnosis     ICD-10-CM    1. Acute pain of right shoulder  M25.511       2. Acute pain of left shoulder  M25.512                      Subjective: Pt reports now able to perform OH chores and home maintenance activities but with posterior L shoulder pain. Objective: See treatment diary below    Assessment: Pt demonstrated improved pain, ROM, and arthrokinematics after addressing pec minor, UT, c/s, and t/s restrictions. Plan: Cont. POC. Precautions:  PMHx: HTN, CKD II  Dx: L subacromial impingement, possible RTC tear; acute R RTC strainR    Manuals 6/6 6/8 6/13 6/15 6/20 6/22 6/27 6/29 7/11    Laser R subacromial space 3000J 3000J 3000J 3000J         Laser L subacromial space 3000J 3000J 3000J 3000J         FF MWM R/  1x10            ABD MWM R/  1x10            B UT Graston     6 min 5 min 5 min 5 min 6 min    Gr IV T1-7 PA mobs in prone     1x30 ea 1x40 ea 1x40 ea  KF 1x40 ea  KF 1x40 ea                 Neuro Re-Ed             Seated t/s extension 1x10  HEP            C/s retraction OP 1x10  HEP            C/s retraction OP extension 1x10  HEP    2x10 3x10 3x10 3x10 3x10    FF AAROM B/  2x10 B/ 2x10 B/ 2x10 B/ 2x10 B/  1x10 B/  1x10 B/  1x10 B/  1x10 B/  1x10    FF AAROM ecc lowering B/  1x10 B/  2x10 B/ 2x10 B/ 2x10 B/  1x10 B/  1x10 B/  1x10 B/  1x10 B/  1x10                 ABD AAROM L/  1x10 L/ 1x10 L/ 1x10 L/ 1x10 B/  1x10 B/  1x10 B/  1x10 B/  1x10 B/  1x10    ABD AAROM ecc lowering L/  1x10 L/ 1x10 L/ 1x10 L/ 1x10 B/  1x10 B/  1x10 B/  1x10 B/  1x10 B/  1x10    Prone I's U/  3x10 U/   3x10 U/  2x10 U/  2x10         B TB T's R/  3x10 R/   3x10 R/   3x10 R/   3x10 R/  3x12 R/  3x10 R/  3x10 R/  3x10 R/  3x12    Prone Y's  U/  2x10 U/  2x10 U/  2x10 U/  2x10 U/  2x10 U/  3x10 U/  3x10 U/  3x10 U/  3x12 Ther Ex             Pulleys: FF B/  3x10 B/  3x10 B/  3x10 B/  3x10         Pulleys: ABD nv B/  1x10 B/  1x10 B/  1x10         L TB ER 90 ABD Black  3x10 Blue  3x10 Blue  2x10 Blue  2x10 Blue  3x10 Blue  3x10 Blue  3x10 Blue  3x10 Blue  3x10                 R TB ER 45 ABD R/  3x10 R/   3x10 R/   3x10 R/   3x10         R TB ER 90 ABD     Y/  3x10 Y/  3x10 Y/  3x10 Y/  3x10 Blue  3x10    L UT stretch     10"x3 10"x3 10"x3 10"x3 10"x3    Doorway pec stretch         10"x3                 Ther Activity                                       Gait Training                                       Modalities

## 2023-07-13 ENCOUNTER — EVALUATION (OUTPATIENT)
Dept: PHYSICAL THERAPY | Facility: CLINIC | Age: 78
End: 2023-07-13
Payer: MEDICARE

## 2023-07-13 DIAGNOSIS — M25.512 CHRONIC LEFT SHOULDER PAIN: ICD-10-CM

## 2023-07-13 DIAGNOSIS — G89.29 CHRONIC LEFT SHOULDER PAIN: ICD-10-CM

## 2023-07-13 DIAGNOSIS — G89.29 CHRONIC RIGHT SHOULDER PAIN: Primary | ICD-10-CM

## 2023-07-13 DIAGNOSIS — M25.511 CHRONIC RIGHT SHOULDER PAIN: Primary | ICD-10-CM

## 2023-07-13 PROCEDURE — 97140 MANUAL THERAPY 1/> REGIONS: CPT | Performed by: PHYSICAL THERAPIST

## 2023-07-13 PROCEDURE — 97112 NEUROMUSCULAR REEDUCATION: CPT | Performed by: PHYSICAL THERAPIST

## 2023-07-13 PROCEDURE — 97110 THERAPEUTIC EXERCISES: CPT | Performed by: PHYSICAL THERAPIST

## 2023-07-13 NOTE — PROGRESS NOTES
PT Re-Evaluation     Today's date: 2023  Patient name: Taylor Mckeon  : 1945  MRN: [id number]  Referring provider: Roya Oro MD  Dx:   Encounter Diagnosis     ICD-10-CM    1. Acute pain of right shoulder  M25.511       2. Acute pain of left shoulder  M25.512                      Assessment  Assessment details: Pt demonstrates normalized B shoulder AROM, improved R shoulder strength, and improved pain and function with OH activities with POC changes to emphasize thoracic and lower cervical ROM, postural awareness, and scapular stabilization. He will continue to benefit from 4 more weeks of skilled PT services to address his remaining impairments in order to further improve pain and function. Impairments: abnormal muscle firing, abnormal or restricted ROM, abnormal movement, activity intolerance, impaired physical strength, lacks appropriate home exercise program, pain with function, poor posture  and poor body mechanics  Understanding of Dx/Px/POC: good   Prognosis: good    Goals  STG - 4 wks  1) pt will demonstrate 90 deg active FE (met)  2) pt will improve pain 50% (met)    LTG - 8-12 wks  1) pt will demonstrate 135 deg active FE (met)  2) pt will improve pain 90% (partially met)    Plan  Planned modality interventions: low level laser therapy  Planned therapy interventions: joint mobilization, manual therapy, body mechanics training, neuromuscular re-education, strengthening, stretching, patient education, postural training, therapeutic activities, therapeutic exercise, home exercise program, functional ROM exercises and flexibility  Frequency: 2x week  Duration in weeks: 4  Treatment plan discussed with: patient        Subjective Evaluation    History of Present Illness  Mechanism of injury: Pt is a 68 y.o. male with PMHx significant for HTN, CKD II.  He reports significantly improved ability to perform OH activities over the past few weeks and 1/10 pain at worst until this past weekend when he did 5 hrs of yardwork including mowing, weedwhacking, and using a leaf blower. He reports episodes of brief, sharp 9/10 L shoulder pain with cross-body or OH reaching over the past couple days; he denies increase in R shoulder pain. Patient Goals  Patient goals for therapy: decreased pain, increased motion, return to sport/leisure activities and increased strength    Pain  Current pain ratin  At best pain ratin  At worst pain ratin  Location: L superior, posterior, lateral shoulder  Quality: dull ache and sharp  Relieving factors: rest  Aggravating factors: overhead activity and lifting  Progression: improved    Hand dominance: right      Diagnostic Tests  No diagnostic tests performed  Treatments  Previous treatment: physical therapy  Current treatment: physical therapy        Objective     Postural Observations  Seated posture: poor  Standing posture: poor  Correction of posture: makes symptoms better        Active Range of Motion   Left Shoulder   Flexion: 155 degrees with pain  Extension: 20 degrees   Abduction: 155 degrees with pain  External rotation BTH: C7   Internal rotation BTB: T11     Right Shoulder   Flexion: 155 degrees   Extension: 25 degrees   Abduction: 155 degrees   External rotation BTH: C5   Internal rotation BTB: T11     Passive Range of Motion   Left Shoulder   Flexion: 155 degrees   Abduction: 155 degrees   External rotation 90°: 80 degrees   Internal rotation 90°: 60 degrees     Right Shoulder   Flexion: 150 degrees with pain  Abduction: 150 degrees   External rotation 90°: 80 degrees   Internal rotation 90°: 40 degrees     Joint Play   Left Shoulder  Hypomobile in the posterior capsule and inferior capsule. Right Shoulder  Joints within functional limits are the posterior capsule and inferior capsule.      Hypomobile: T1, T2, T3, T4, T5, T6, T7 and T8   Mechanical Assessment    Cervical    Seated retraction: repeated movements   Pain location: no change  Pain intensity: better  Pain level: decreased  Seated Extension: repeated movements  Pain location: no change  Pain intensity: better  Pain level: decreased    Thoracic    Seated extension: repeated movements  Pain location: no change  Pain intensity: better  Pain level: decreased    Lumbar      Strength/Myotome Testing     Left Shoulder     Planes of Motion   Flexion: 4+   Abduction: 4+   External rotation at 0°: 4+ (improved with postural correction)   External rotation at 90°: 4+   Internal rotation at 0°: 4+   Internal rotation at 90°: 4+     Right Shoulder     Planes of Motion   Flexion: 4   Abduction: 4   External rotation at 0°: 4   External rotation at 90°: 4   Internal rotation at 0°: 4+   Internal rotation at 90°: 4-     Tests     Left Shoulder   Positive Hawkin's. Negative apprehension, drop arm, external rotation lag sign, Neer's and painful arc. Right Shoulder   Negative drop arm and external rotation lag sign. Precautions:  PMHx: HTN, CKD II  Dx: chronic B subacromial impingement    Manuals 7/13            Gr IV prone T1-8 PA mobs 1x40 ea            Graston B UT, post scalenes 7 min                                                                             Neuro Re-Ed             Seated t/s extension 3x10                         C/s retraction OP extension 3x10            FF AAROM B/  1x10            FF AAROM ecc lowering B/  1x10                         ABD AAROM B/  1x10            ABD AAROM ecc lowering B/  1x10                         B TB T's R/  3x12            Prone Y's U/  3x12 U/  2x10                                                  Ther Ex             UT stretch B/  10"x3            Doorway pec stretch 10"x3            TB ER 90/90 Blue  L/  3x10  Kandice Crosser  3x12                                                                                          Ther Activity                                       Gait Training                                       Modalities

## 2023-07-18 ENCOUNTER — OFFICE VISIT (OUTPATIENT)
Dept: PHYSICAL THERAPY | Facility: CLINIC | Age: 78
End: 2023-07-18
Payer: MEDICARE

## 2023-07-18 DIAGNOSIS — M25.512 CHRONIC LEFT SHOULDER PAIN: ICD-10-CM

## 2023-07-18 DIAGNOSIS — M25.511 CHRONIC RIGHT SHOULDER PAIN: Primary | ICD-10-CM

## 2023-07-18 DIAGNOSIS — G89.29 CHRONIC LEFT SHOULDER PAIN: ICD-10-CM

## 2023-07-18 DIAGNOSIS — G89.29 CHRONIC RIGHT SHOULDER PAIN: Primary | ICD-10-CM

## 2023-07-18 PROCEDURE — 97110 THERAPEUTIC EXERCISES: CPT | Performed by: PHYSICAL THERAPIST

## 2023-07-18 PROCEDURE — 97112 NEUROMUSCULAR REEDUCATION: CPT | Performed by: PHYSICAL THERAPIST

## 2023-07-18 PROCEDURE — 97140 MANUAL THERAPY 1/> REGIONS: CPT | Performed by: PHYSICAL THERAPIST

## 2023-07-18 NOTE — PROGRESS NOTES
Daily Note     Today's date: 2023  Patient name: Paz Oakes  : 1945  MRN: [id number]  Referring provider: Tiffanie Laughlin MD  Dx:   Encounter Diagnosis     ICD-10-CM    1. Chronic right shoulder pain  M25.511     G89.29       2. Chronic left shoulder pain  M25.512     G89.29                      Subjective: Pt reports intermittent sharp L shoulder pain when reaching across his body or FE in an IR'ed position. Objective: See treatment diary below    Assessment: Pt demonstrated less pain and better ROM when moving his L arm and cued to correct scapular elevation and protraction. Plan: Cont. POC to address postural abnormalities and scapular weakness. Precautions:  PMHx: HTN, CKD II  Dx: chronic B subacromial impingement    Manuals            Gr IV prone T1-8 PA mobs 1x40 ea 1x40 ea           Graston B UT, post scalenes 7 min 8 min                                                                            Neuro Re-Ed             Seated t/s extension 3x10 3x10                        C/s retraction OP extension 3x10 3x10           FF AAROM B/  1x10 B/  1x10           FF AAROM ecc lowering B/  1x10 B/  1x10                        ABD AAROM B/  1x10 B/  1x10           ABD AAROM ecc lowering B/  1x10 B/  1x10                        B TB T's R/  3x12 R/  3x15           Prone Y's U/  3x12 U/  3x12                                                  Ther Ex             UT stretch B/  10"x3 B/  10"x3           Doorway pec stretch 10"x3 10"x3           TB ER 90/90 Blue  L/  3x10  Green  R  3x12 Blue  L/  3x10  Green  R  3x15                                                                                         Ther Activity                                       Gait Training                                       Modalities

## 2023-07-20 ENCOUNTER — OFFICE VISIT (OUTPATIENT)
Dept: PHYSICAL THERAPY | Facility: CLINIC | Age: 78
End: 2023-07-20
Payer: MEDICARE

## 2023-07-20 DIAGNOSIS — M25.512 CHRONIC LEFT SHOULDER PAIN: ICD-10-CM

## 2023-07-20 DIAGNOSIS — G89.29 CHRONIC RIGHT SHOULDER PAIN: Primary | ICD-10-CM

## 2023-07-20 DIAGNOSIS — G89.29 CHRONIC LEFT SHOULDER PAIN: ICD-10-CM

## 2023-07-20 DIAGNOSIS — M25.511 CHRONIC RIGHT SHOULDER PAIN: Primary | ICD-10-CM

## 2023-07-20 PROCEDURE — 97112 NEUROMUSCULAR REEDUCATION: CPT

## 2023-07-20 PROCEDURE — 97110 THERAPEUTIC EXERCISES: CPT

## 2023-07-20 NOTE — PROGRESS NOTES
Daily Note     Today's date: 2023  Patient name: Tanisha Vee  : 1945  MRN: [id number]  Referring provider: Shira Duffy MD  Dx:   Encounter Diagnosis     ICD-10-CM    1. Chronic right shoulder pain  M25.511     G89.29       2. Chronic left shoulder pain  M25.512     G89.29                      Subjective: Pt reports playing volleyball two days ago with mild shoulder pain afterwards. Objective: See treatment diary below    Assessment: Pt demonstrated increased LT activation with prone Y's but continued fatigue with current TE program. Pt demonstrated continued UT activation despite vcs and tcs with TB strengthening. Plan: Cont. POC to address postural abnormalities and scapular weakness. Precautions:  PMHx: HTN, CKD II  Dx: chronic B subacromial impingement    Manuals           Gr IV prone T1-8 PA mobs 1x40 ea 1x40 ea 1x40 ea WS PT           Graston B UT, post scalenes 7 min 8 min 8 min                                                                           Neuro Re-Ed             Seated t/s extension 3x10 3x10 3x10                       C/s retraction OP extension 3x10 3x10 3x10          FF AAROM B/  1x10 B/  1x10 B/  1x10          FF AAROM ecc lowering B/  1x10 B/  1x10 B/  1x10                       ABD AAROM B/  1x10 B/  1x10 B/  1x10          ABD AAROM ecc lowering B/  1x10 B/  1x10 B/  1x10                       B TB T's R/  3x12 R/  3x15 R/  3x15          Prone Y's U/  3x12 U/  3x12 U/  3x12                                                 Ther Ex             UT stretch B/  10"x3 B/  10"x3 B/  10"x3          Doorway pec stretch 10"x3 10"x3 10"x3          TB ER 90/90 Blue  L/  3x10  Green  R  3x12 Blue  L/  3x10  Green  R  3x15 Blue  L/  3x10  Green  R  3x15                                                                                        Ther Activity                                       Gait Training                                       Modalities
[Adequate] : adequate

## 2023-07-25 ENCOUNTER — OFFICE VISIT (OUTPATIENT)
Dept: PHYSICAL THERAPY | Facility: CLINIC | Age: 78
End: 2023-07-25
Payer: MEDICARE

## 2023-07-25 DIAGNOSIS — G89.29 CHRONIC LEFT SHOULDER PAIN: ICD-10-CM

## 2023-07-25 DIAGNOSIS — M25.512 CHRONIC LEFT SHOULDER PAIN: ICD-10-CM

## 2023-07-25 DIAGNOSIS — G89.29 CHRONIC RIGHT SHOULDER PAIN: Primary | ICD-10-CM

## 2023-07-25 DIAGNOSIS — M25.511 CHRONIC RIGHT SHOULDER PAIN: Primary | ICD-10-CM

## 2023-07-25 PROCEDURE — 97140 MANUAL THERAPY 1/> REGIONS: CPT | Performed by: PHYSICAL THERAPIST

## 2023-07-25 PROCEDURE — 97110 THERAPEUTIC EXERCISES: CPT | Performed by: PHYSICAL THERAPIST

## 2023-07-25 PROCEDURE — 97112 NEUROMUSCULAR REEDUCATION: CPT | Performed by: PHYSICAL THERAPIST

## 2023-07-25 NOTE — PROGRESS NOTES
Daily Note     Today's date: 2023  Patient name: Jayro Ovalles  : 1945  MRN: [id number]  Referring provider: Rodrick Vásquez MD  Dx:   Encounter Diagnosis     ICD-10-CM    1. Chronic right shoulder pain  M25.511     G89.29       2. Chronic left shoulder pain  M25.512     G89.29                      Subjective: Pt reports no pain at rest and feeling the best he has felt in weeks. Objective: See treatment diary below    Assessment: Pt demonstrated R post scalene hypertonicity and improved B RTC strength and scapular stability. Plan: Cont. POC to address postural abnormalities and scapular weakness. Precautions:  PMHx: HTN, CKD II  Dx: chronic B subacromial impingement    Manuals          Gr IV prone T1-8 PA mobs 1x40 ea 1x40 ea 1x40 ea WS PT  1x40 ea         Graston B UT, post scalenes 7 min 8 min 8 min 8 min                                                                          Neuro Re-Ed             Seated t/s extension 3x10 3x10 3x10 3x10                      C/s retraction OP extension 3x10 3x10 3x10 3x10         FF AAROM B/  1x10 B/  1x10 B/  1x10 B/  1x10         FF AAROM ecc lowering B/  1x10 B/  1x10 B/  1x10 B/  1x10                      ABD AAROM B/  1x10 B/  1x10 B/  1x10 B/  1x10         ABD AAROM ecc lowering B/  1x10 B/  1x10 B/  1x10 B/  1x10                      B TB T's R/  3x12 R/  3x15 R/  3x15 R/  3x15 R/  3x20        Prone Y's U/  3x12 U/  3x12 U/  3x12 U/  3x15                                                Ther Ex             UT stretch B/  10"x3 B/  10"x3 B/  10"x3 B/  10"x3         Doorway pec stretch 10"x3 10"x3 10"x3 10"x3         TB ER 90/90 Blue  L/  3x10  Green  R  3x12 Blue  L/  3x10  Green  R  3x15 Blue  L/  3x10  Green  R  3x15 Blue  L/  3x15  Green  R  3x15                                                                                       Ther Activity                                       Gait Training Modalities

## 2023-07-27 ENCOUNTER — OFFICE VISIT (OUTPATIENT)
Dept: PHYSICAL THERAPY | Facility: CLINIC | Age: 78
End: 2023-07-27
Payer: MEDICARE

## 2023-07-27 DIAGNOSIS — M25.511 ACUTE PAIN OF RIGHT SHOULDER: ICD-10-CM

## 2023-07-27 DIAGNOSIS — M25.511 CHRONIC RIGHT SHOULDER PAIN: Primary | ICD-10-CM

## 2023-07-27 DIAGNOSIS — G89.29 CHRONIC RIGHT SHOULDER PAIN: Primary | ICD-10-CM

## 2023-07-27 DIAGNOSIS — M25.512 CHRONIC LEFT SHOULDER PAIN: ICD-10-CM

## 2023-07-27 DIAGNOSIS — M25.512 ACUTE PAIN OF LEFT SHOULDER: ICD-10-CM

## 2023-07-27 DIAGNOSIS — G89.29 CHRONIC LEFT SHOULDER PAIN: ICD-10-CM

## 2023-07-27 PROCEDURE — 97110 THERAPEUTIC EXERCISES: CPT

## 2023-07-27 PROCEDURE — 97140 MANUAL THERAPY 1/> REGIONS: CPT

## 2023-07-27 PROCEDURE — 97112 NEUROMUSCULAR REEDUCATION: CPT

## 2023-07-27 NOTE — PROGRESS NOTES
Daily Note     Today's date: 2023  Patient name: Irma Ernandez  : 1945  MRN: [id number]  Referring provider: Armani Sorto MD  Dx:   Encounter Diagnosis     ICD-10-CM    1. Chronic right shoulder pain  M25.511     G89.29       2. Chronic left shoulder pain  M25.512     G89.29       3. Acute pain of right shoulder  M25.511       4. Acute pain of left shoulder  M25.512           Start Time: 1000  Stop Time: 1045  Total time in clinic (min): 45 minutes    Subjective: Patient reports having no R shoulder pain and slight L shoulder discomfort. Objective: See treatment diary below    Assessment: Patient required tactile cueing to properly position his R shoulder during TB 90/90 ER. Wally Vicente is progressing well with RTC strengthening. Plan: Cont. POC to address postural abnormalities and scapular weakness. Precautions:  PMHx: HTN, CKD II  Dx: chronic B subacromial impingement    Manuals         Gr IV prone T1-8 PA mobs 1x40 ea 1x40 ea 1x40 ea WS PT  1x40 ea         Graston B UT, post scalenes 7 min 8 min 8 min 8 min 8 min                                                                         Neuro Re-Ed             Seated t/s extension 3x10 3x10 3x10 3x10 3x10                     C/s retraction OP extension 3x10 3x10 3x10 3x10 3x10        FF AAROM B/  1x10 B/  1x10 B/  1x10 B/  1x10 B/  1x10        FF AAROM ecc lowering B/  1x10 B/  1x10 B/  1x10 B/  1x10 B/  1x10                     ABD AAROM B/  1x10 B/  1x10 B/  1x10 B/  1x10 B/  1x10        ABD AAROM ecc lowering B/  1x10 B/  1x10 B/  1x10 B/  1x10 B/  1x10                     B TB T's R/  3x12 R/  3x15 R/  3x15 R/  3x15 R/  3x20        Prone Y's U/  3x12 U/  3x12 U/  3x12 U/  3x15                                                Ther Ex             UT stretch B/  10"x3 B/  10"x3 B/  10"x3 B/  10"x3 B/  10"x3        Doorway pec stretch 10"x3 10"x3 10"x3 10"x3 10"x3        TB ER 90/90 Blue  L/  3x10  Green  R  3x12 Blue  L/  3x10  Green  R  3x15 Blue  L/  3x10  Green  R  3x15 Blue  L/  3x15  Green  R  3x15 Blue  L/  3x15  Green  R  3x15                                                                                      Ther Activity                                       Gait Training                                       Modalities

## 2023-08-01 ENCOUNTER — OFFICE VISIT (OUTPATIENT)
Dept: PHYSICAL THERAPY | Facility: CLINIC | Age: 78
End: 2023-08-01
Payer: MEDICARE

## 2023-08-01 DIAGNOSIS — G89.29 CHRONIC RIGHT SHOULDER PAIN: Primary | ICD-10-CM

## 2023-08-01 DIAGNOSIS — M25.512 ACUTE PAIN OF LEFT SHOULDER: ICD-10-CM

## 2023-08-01 DIAGNOSIS — M25.512 CHRONIC LEFT SHOULDER PAIN: ICD-10-CM

## 2023-08-01 DIAGNOSIS — M25.511 CHRONIC RIGHT SHOULDER PAIN: Primary | ICD-10-CM

## 2023-08-01 DIAGNOSIS — G89.29 CHRONIC LEFT SHOULDER PAIN: ICD-10-CM

## 2023-08-01 DIAGNOSIS — M25.511 ACUTE PAIN OF RIGHT SHOULDER: ICD-10-CM

## 2023-08-01 PROCEDURE — 97110 THERAPEUTIC EXERCISES: CPT

## 2023-08-01 PROCEDURE — 97112 NEUROMUSCULAR REEDUCATION: CPT

## 2023-08-01 NOTE — PROGRESS NOTES
Daily Note     Today's date: 2023  Patient name: Joselito Moore  : 1945  MRN: [id number]  Referring provider: Andriy Devries MD  Dx:   Encounter Diagnosis     ICD-10-CM    1. Chronic right shoulder pain  M25.511     G89.29       2. Chronic left shoulder pain  M25.512     G89.29       3. Acute pain of right shoulder  M25.511       4. Acute pain of left shoulder  M25.512           Start Time: 1000  Stop Time: 1045  Total time in clinic (min): 45 minutes    Subjective: Patient reports cleaning his house over the weekend with only slight UT soreness following. Reports feeling good this morning. Objective: See treatment diary below    Assessment: Patient is improving well with RTC strengthening and with functional tasks. Plan: Cont. POC to address postural abnormalities and scapular weakness. Precautions:  PMHx: HTN, CKD II  Dx: chronic B subacromial impingement    Manuals        Gr IV prone T1-8 PA mobs 1x40 ea 1x40 ea 1x40 ea WS PT  1x40 ea 1x40 ea  KF 1x40 ea  KF       [place] B UT, post scalenes 7 min 8 min 8 min 8 min 8 min 8 min                                                                        Neuro Re-Ed             Seated t/s extension 3x10 3x10 3x10 3x10 3x10 3x10                    C/s retraction OP extension 3x10 3x10 3x10 3x10 3x10 3x10       FF AAROM B/  1x10 B/  1x10 B/  1x10 B/  1x10 B/  1x10 B/  1x10       FF AAROM ecc lowering B/  1x10 B/  1x10 B/  1x10 B/  1x10 B/  1x10 B/  1x10                    ABD AAROM B/  1x10 B/  1x10 B/  1x10 B/  1x10 B/  1x10 B/  1x10       ABD AAROM ecc lowering B/  1x10 B/  1x10 B/  1x10 B/  1x10 B/  1x10 B/  1x10                    B TB T's R/  3x12 R/  3x15 R/  3x15 R/  3x15 R/  3x20 R/  3x20       Prone Y's U/  3x12 U/  3x12 U/  3x12 U/  3x15 U/  3x15 U/  3x15                                              Ther Ex             UT stretch B/  10"x3 B/  10"x3 B/  10"x3 B/  10"x3 B/  10"x3 B/  10"x3       [facility] pec stretch 10"x3 10"x3 10"x3 10"x3 10"x3 10"x3       TB ER 90/90 Blue  L/  3x10  Green  R  3x12 Blue  L/  3x10  Green  R  3x15 Blue  L/  3x10  Green  R  3x15 Blue  L/  3x15  Green  R  3x15 Blue  L/  3x15  Green  R  3x15 Blue  L/  3x15  Green  R  3x15                                                                                     Ther Activity                                       Gait Training                                       Modalities

## 2023-08-03 ENCOUNTER — OFFICE VISIT (OUTPATIENT)
Dept: PHYSICAL THERAPY | Facility: CLINIC | Age: 78
End: 2023-08-03
Payer: MEDICARE

## 2023-08-03 DIAGNOSIS — G89.29 CHRONIC RIGHT SHOULDER PAIN: Primary | ICD-10-CM

## 2023-08-03 DIAGNOSIS — M25.511 CHRONIC RIGHT SHOULDER PAIN: Primary | ICD-10-CM

## 2023-08-03 DIAGNOSIS — G89.29 CHRONIC LEFT SHOULDER PAIN: ICD-10-CM

## 2023-08-03 DIAGNOSIS — M25.512 CHRONIC LEFT SHOULDER PAIN: ICD-10-CM

## 2023-08-03 DIAGNOSIS — M25.512 ACUTE PAIN OF LEFT SHOULDER: ICD-10-CM

## 2023-08-03 DIAGNOSIS — M25.511 ACUTE PAIN OF RIGHT SHOULDER: ICD-10-CM

## 2023-08-03 PROCEDURE — 97110 THERAPEUTIC EXERCISES: CPT

## 2023-08-03 PROCEDURE — 97140 MANUAL THERAPY 1/> REGIONS: CPT

## 2023-08-03 PROCEDURE — 97112 NEUROMUSCULAR REEDUCATION: CPT

## 2023-08-03 NOTE — PROGRESS NOTES
Daily Note     Today's date: 8/3/2023  Patient name: Fela Navarro  : 1945  MRN: [id number]  Referring provider: Jackson Alberts MD  Dx:   Encounter Diagnosis     ICD-10-CM    1. Chronic right shoulder pain  M25.511     G89.29       2. Chronic left shoulder pain  M25.512     G89.29       3. Acute pain of right shoulder  M25.511       4. Acute pain of left shoulder  M25.512           Start Time: 1000  Stop Time: 1040  Total time in clinic (min): 40 minutes    Subjective: Patient reports increased L middle scap muscle tightness and UT tightness prior to therapy. Objective: See treatment diary below    Assessment: Patient is improving well with RTC strengthening and with functional tasks. Improved muscular tone following therapy with slight tone remaining. Plan: Cont. POC to address postural abnormalities and scapular weakness. Precautions:  PMHx: HTN, CKD II  Dx: chronic B subacromial impingement    Manuals 7/13 7/18 7/20 7/25 7/27 8/1 8/3      Gr IV prone T1-8 PA mobs 1x40 ea 1x40 ea 1x40 ea WS PT  1x40 ea 1x40 ea  KF 1x40 ea  KF       [place] B UT, post scalenes 7 min 8 min 8 min 8 min 8 min 8 min 8 min                                                                       Neuro Re-Ed             Seated t/s extension 3x10 3x10 3x10 3x10 3x10 3x10 3x10                   C/s retraction OP extension 3x10 3x10 3x10 3x10 3x10 3x10 3x10      FF AAROM B/  1x10 B/  1x10 B/  1x10 B/  1x10 B/  1x10 B/  1x10 B/  1x10      FF AAROM ecc lowering B/  1x10 B/  1x10 B/  1x10 B/  1x10 B/  1x10 B/  1x10 B/  1x10                   ABD AAROM B/  1x10 B/  1x10 B/  1x10 B/  1x10 B/  1x10 B/  1x10 B/  1x10      ABD AAROM ecc lowering B/  1x10 B/  1x10 B/  1x10 B/  1x10 B/  1x10 B/  1x10 B/  1x10                   B TB T's R/  3x12 R/  3x15 R/  3x15 R/  3x15 R/  3x20 R/  3x20 R/  3x20      Prone Y's U/  3x12 U/  3x12 U/  3x12 U/  3x15 U/  3x15 U/  3x15 U/  3x15                                             Ther Ex             UT stretch B/  10"x3 B/  10"x3 B/  10"x3 B/  10"x3 B/  10"x3 B/  10"x3 B/  10"x3      Doorway pec stretch 10"x3 10"x3 10"x3 10"x3 10"x3 10"x3 10"x3      TB ER 90/90 Blue  L/  3x10  Green  R  3x12 Blue  L/  3x10  Green  R  3x15 Blue  L/  3x10  Green  R  3x15 Blue  L/  3x15  Green  R  3x15 Blue  L/  3x15  Green  R  3x15 Blue  L/  3x15  Green  R  3x15 Blue  L/  3x15  Green  R  3x15                                                                                    Ther Activity                                       Gait Training                                       Modalities

## 2023-08-08 ENCOUNTER — OFFICE VISIT (OUTPATIENT)
Dept: PHYSICAL THERAPY | Facility: CLINIC | Age: 78
End: 2023-08-08
Payer: MEDICARE

## 2023-08-08 DIAGNOSIS — M25.512 CHRONIC LEFT SHOULDER PAIN: ICD-10-CM

## 2023-08-08 DIAGNOSIS — G89.29 CHRONIC LEFT SHOULDER PAIN: ICD-10-CM

## 2023-08-08 DIAGNOSIS — G89.29 CHRONIC RIGHT SHOULDER PAIN: Primary | ICD-10-CM

## 2023-08-08 DIAGNOSIS — M25.511 CHRONIC RIGHT SHOULDER PAIN: Primary | ICD-10-CM

## 2023-08-08 PROCEDURE — 97140 MANUAL THERAPY 1/> REGIONS: CPT | Performed by: PHYSICAL THERAPIST

## 2023-08-08 PROCEDURE — 97110 THERAPEUTIC EXERCISES: CPT | Performed by: PHYSICAL THERAPIST

## 2023-08-08 PROCEDURE — 97112 NEUROMUSCULAR REEDUCATION: CPT | Performed by: PHYSICAL THERAPIST

## 2023-08-08 NOTE — PROGRESS NOTES
Daily Note     Today's date: 2023  Patient name: Drew Sommer  : 1945  MRN: [id number]  Referring provider: Giulia Bernard MD  Dx:   Encounter Diagnosis     ICD-10-CM    1. Chronic right shoulder pain  M25.511     G89.29       2. Chronic left shoulder pain  M25.512     G89.29                      Subjective: Pt reports B UT and superior shoulder "heaviness" at rest.    Objective: See treatment diary below    Assessment: Pt able to abolish subjective complaints with repeated lower c/s extension. Plan: Cont. POC to address postural abnormalities and scapular weakness. Precautions:  PMHx: HTN, CKD II  Dx: chronic B subacromial impingement    Manuals 7/13 7/18 7/20 7/25 7/27 8/1 8/3 8/8     Gr IV prone T1-8 PA mobs 1x40 ea 1x40 ea 1x40 ea WS PT  1x40 ea 1x40 ea  KF 1x40 ea  KF  1x40 ea     [place] B UT, post scalenes 7 min 8 min 8 min 8 min 8 min 8 min 8 min      Manual c/s retraction seated OP        2x20     Supine c/s retraction/distraction/extension        1x5                                            Neuro Re-Ed             Seated t/s extension 3x10 3x10 3x10 3x10 3x10 3x10 3x10 3x10                  C/s retraction OP extension 3x10 3x10 3x10 3x10 3x10 3x10 3x10 6x10     FF AAROM B/  1x10 B/  1x10 B/  1x10 B/  1x10 B/  1x10 B/  1x10 B/  1x10      FF AAROM ecc lowering B/  1x10 B/  1x10 B/  1x10 B/  1x10 B/  1x10 B/  1x10 B/  1x10                   ABD AAROM B/  1x10 B/  1x10 B/  1x10 B/  1x10 B/  1x10 B/  1x10 B/  1x10      ABD AAROM ecc lowering B/  1x10 B/  1x10 B/  1x10 B/  1x10 B/  1x10 B/  1x10 B/  1x10                   B TB T's R/  3x12 R/  3x15 R/  3x15 R/  3x15 R/  3x20 R/  3x20 R/  3x20 R/  3x20     Prone Y's U/  3x12 U/  3x12 U/  3x12 U/  3x15 U/  3x15 U/  3x15 U/  3x15 U/  3x15                                            Ther Ex             UT stretch B/  10"x3 B/  10"x3 B/  10"x3 B/  10"x3 B/  10"x3 B/  10"x3 B/  10"x3 B/  10"x3     Doorway pec stretch 10"x3 10"x3 10"x3 10"x3 10"x3 10"x3 10"x3 10"X3     TB ER 90/90 Blue  L/  3x10  Green  R  3x12 Blue  L/  3x10  Green  R  3x15 Blue  L/  3x10  Green  R  3x15 Blue  L/  3x15  Green  R  3x15 Blue  L/  3x15  Green  R  3x15 Blue  L/  3x15  Green  R  3x15 Blue  L/  3x15  Green  R  3x15 Blue  L/  3x15  Green  R  3x15                                                                                   Ther Activity                                       Gait Training                                       Modalities

## 2023-08-10 ENCOUNTER — OFFICE VISIT (OUTPATIENT)
Dept: PHYSICAL THERAPY | Facility: CLINIC | Age: 78
End: 2023-08-10
Payer: MEDICARE

## 2023-08-10 DIAGNOSIS — G89.29 CHRONIC LEFT SHOULDER PAIN: ICD-10-CM

## 2023-08-10 DIAGNOSIS — M25.511 CHRONIC RIGHT SHOULDER PAIN: Primary | ICD-10-CM

## 2023-08-10 DIAGNOSIS — M25.512 CHRONIC LEFT SHOULDER PAIN: ICD-10-CM

## 2023-08-10 DIAGNOSIS — G89.29 CHRONIC RIGHT SHOULDER PAIN: Primary | ICD-10-CM

## 2023-08-10 PROCEDURE — 97140 MANUAL THERAPY 1/> REGIONS: CPT | Performed by: PHYSICAL THERAPIST

## 2023-08-10 PROCEDURE — 97112 NEUROMUSCULAR REEDUCATION: CPT | Performed by: PHYSICAL THERAPIST

## 2023-08-10 PROCEDURE — 97110 THERAPEUTIC EXERCISES: CPT | Performed by: PHYSICAL THERAPIST

## 2023-08-10 NOTE — PROGRESS NOTES
Daily Note     Today's date: 8/10/2023  Patient name: Drew Sommer  : 1945  MRN: [id number]  Referring provider: Giulia Bernard MD  Dx:   Encounter Diagnosis     ICD-10-CM    1. Chronic right shoulder pain  M25.511     G89.29       2. Chronic left shoulder pain  M25.512     G89.29                      Subjective: Pt reports feeling better despite sitting in bad chairs at [facility] the past 2 days. Objective: See treatment diary below    Assessment: Pt demonstrated improved T1-3 joint mobility and able to abolish with manual c/s extension. Plan: Cont. POC to address postural abnormalities and scapular weakness. Precautions:  PMHx: HTN, CKD II  Dx: chronic B subacromial impingement    Manuals 7/13 7/18 7/20 7/25 7/27 8/1 8/3 8/8 8/10    Gr IV prone T1-8 PA mobs 1x40 ea 1x40 ea 1x40 ea WS PT  1x40 ea 1x40 ea  KF 1x40 ea  KF  1x40 ea 1x40 ea    [place] B UT, post scalenes 7 min 8 min 8 min 8 min 8 min 8 min 8 min      Manual c/s retraction seated OP        2x20 3x10    Supine c/s retraction/distraction/extension        1x5 1x10                                           Neuro Re-Ed             Seated t/s extension 3x10 3x10 3x10 3x10 3x10 3x10 3x10 3x10 5x10                 C/s retraction OP extension 3x10 3x10 3x10 3x10 3x10 3x10 3x10 6x10 5x10    FF AAROM B/  1x10 B/  1x10 B/  1x10 B/  1x10 B/  1x10 B/  1x10 B/  1x10      FF AAROM ecc lowering B/  1x10 B/  1x10 B/  1x10 B/  1x10 B/  1x10 B/  1x10 B/  1x10                   ABD AAROM B/  1x10 B/  1x10 B/  1x10 B/  1x10 B/  1x10 B/  1x10 B/  1x10      ABD AAROM ecc lowering B/  1x10 B/  1x10 B/  1x10 B/  1x10 B/  1x10 B/  1x10 B/  1x10                   B TB T's R/  3x12 R/  3x15 R/  3x15 R/  3x15 R/  3x20 R/  3x20 R/  3x20 R/  3x20 R/  3x20    Prone Y's U/  3x12 U/  3x12 U/  3x12 U/  3x15 U/  3x15 U/  3x15 U/  3x15 U/  3x15 U/  3x15                                           Ther Ex             UT stretch /  10"x3 B/  10"x3 B/  10"x3 B/  10"x3 B/  10"x3 B/  10"x3 B/  10"x3 B/  10"x3 B/  10"x3    Doorway pec stretch 10"x3 10"x3 10"x3 10"x3 10"x3 10"x3 10"x3 10"x3 10"x3    TB ER 90/90 Blue  L/  3x10  Green  R  3x12 Blue  L/  3x10  Green  R  3x15 Blue  L/  3x10  Green  R  3x15 Blue  L/  3x15  Green  R  3x15 Blue  L/  3x15  Green  R  3x15 Blue  L/  3x15  Green  R  3x15 Blue  L/  3x15  Green  R  3x15 Blue  L/  3x15  Green  R  3x15 Blue  L/  3x15  Green  R  3x15                                                                                  Ther Activity                                       Gait Training                                       Modalities

## 2023-08-14 DIAGNOSIS — K21.9 LARYNGOPHARYNGEAL REFLUX (LPR): ICD-10-CM

## 2023-08-14 DIAGNOSIS — K21.9 GASTROESOPHAGEAL REFLUX DISEASE WITHOUT ESOPHAGITIS: ICD-10-CM

## 2023-08-14 RX ORDER — OMEPRAZOLE 40 MG/1
40 CAPSULE, DELAYED RELEASE ORAL 2 TIMES DAILY
Qty: 180 CAPSULE | Refills: 0 | Status: SHIPPED | OUTPATIENT
Start: 2023-08-14

## 2023-08-15 ENCOUNTER — OFFICE VISIT (OUTPATIENT)
Dept: PHYSICAL THERAPY | Facility: CLINIC | Age: 78
End: 2023-08-15
Payer: MEDICARE

## 2023-08-15 DIAGNOSIS — G89.29 CHRONIC RIGHT SHOULDER PAIN: Primary | ICD-10-CM

## 2023-08-15 DIAGNOSIS — M25.511 ACUTE PAIN OF RIGHT SHOULDER: ICD-10-CM

## 2023-08-15 DIAGNOSIS — M25.512 CHRONIC LEFT SHOULDER PAIN: ICD-10-CM

## 2023-08-15 DIAGNOSIS — M25.512 ACUTE PAIN OF LEFT SHOULDER: ICD-10-CM

## 2023-08-15 DIAGNOSIS — M25.511 CHRONIC RIGHT SHOULDER PAIN: Primary | ICD-10-CM

## 2023-08-15 DIAGNOSIS — G89.29 CHRONIC LEFT SHOULDER PAIN: ICD-10-CM

## 2023-08-15 PROCEDURE — 97112 NEUROMUSCULAR REEDUCATION: CPT

## 2023-08-15 PROCEDURE — 97110 THERAPEUTIC EXERCISES: CPT

## 2023-08-15 PROCEDURE — 97140 MANUAL THERAPY 1/> REGIONS: CPT

## 2023-08-15 NOTE — PROGRESS NOTES
Daily Note     Today's date: 8/15/2023  Patient name: Airam Huitron  : 1945  MRN: [id number]  Referring provider: Sindhu Lopez MD  Dx:   Encounter Diagnosis     ICD-10-CM    1. Chronic right shoulder pain  M25.511     G89.29       2. Chronic left shoulder pain  M25.512     G89.29       3. Acute pain of right shoulder  M25.511       4. Acute pain of left shoulder  M25.512           Start Time: 1100  Stop Time: 1140  Total time in clinic (min): 40 minutes    Subjective: Patient reports slight UT tightness prior to therapy. Notes an occasional L shoulder jolt of pain with certain movements. Objective: See treatment diary below    Assessment: Patient responding well to the current PT POC. As Snehal Nassar continues to maintain a good postural position and improve strength over time his UT dominance has become less intense. Plan: Cont. POC to address postural abnormalities and scapular weakness. Precautions:  PMHx: HTN, CKD II  Dx: chronic B subacromial impingement    Manuals 7/13 7/18 7/20 7/25 7/27 8/1 8/3 8/8 8/10 8/15   Gr IV prone T1-8 PA mobs 1x40 ea 1x40 ea 1x40 ea WS PT  1x40 ea 1x40 ea  KF 1x40 ea  KF  1x40 ea 1x40 ea 1x40 ea  KF   [place] B UT, post scalenes 7 min 8 min 8 min 8 min 8 min 8 min 8 min   8 min   Manual c/s retraction seated OP        2x20 3x10    Supine c/s retraction/distraction/extension        1x5 1x10                                           Neuro Re-Ed             Seated t/s extension 3x10 3x10 3x10 3x10 3x10 3x10 3x10 3x10 5x10 5x10                C/s retraction OP extension 3x10 3x10 3x10 3x10 3x10 3x10 3x10 6x10 5x10 5x10   FF AAROM B/  1x10 B/  1x10 B/  1x10 B/  1x10 B/  1x10 B/  1x10 B/  1x10      FF AAROM ecc lowering B/  1x10 B/  1x10 B/  1x10 B/  1x10 B/  1x10 B/  1x10 B/  1x10                   ABD AAROM B/  1x10 B/  1x10 B/  1x10 B/  1x10 B/  1x10 B/  1x10 B/  1x10      ABD AAROM ecc lowering B/  1x10 B/  1x10 B/  1x10 B/  1x10 B/  1x10 B/  1x10 B/  1x10 B TB T's R/  3x12 R/  3x15 R/  3x15 R/  3x15 R/  3x20 R/  3x20 R/  3x20 R/  3x20 R/  3x20 R/  3x20   Prone Y's U/  3x12 U/  3x12 U/  3x12 U/  3x15 U/  3x15 U/  3x15 U/  3x15 U/  3x15 U/  3x15 U/  3x15                                          Ther Ex             UT stretch B/  10"x3 B/  10"x3 B/  10"x3 B/  10"x3 B/  10"x3 B/  10"x3 B/  10"x3 B/  10"x3 B/  10"x3 B/  10"x3   Doorway pec stretch 10"x3 10"x3 10"x3 10"x3 10"x3 10"x3 10"x3 10"x3 10"x3 10"x3   TB ER 90/90 Blue  L/  3x10  Green  R  3x12 Blue  L/  3x10  Green  R  3x15 Blue  L/  3x10  Green  R  3x15 Blue  L/  3x15  Green  R  3x15 Blue  L/  3x15  Green  R  3x15 Blue  L/  3x15  Green  R  3x15 Blue  L/  3x15  Green  R  3x15 Blue  L/  3x15  Green  R  3x15 Blue  L/  3x15  Green  R  3x15 Blue  L/  3x15  Green  R  3x15                                                                                 Ther Activity                                       Gait Training                                       Modalities

## 2023-08-17 ENCOUNTER — OFFICE VISIT (OUTPATIENT)
Dept: PHYSICAL THERAPY | Facility: CLINIC | Age: 78
End: 2023-08-17
Payer: MEDICARE

## 2023-08-17 DIAGNOSIS — M25.512 ACUTE PAIN OF LEFT SHOULDER: ICD-10-CM

## 2023-08-17 DIAGNOSIS — G89.29 CHRONIC RIGHT SHOULDER PAIN: Primary | ICD-10-CM

## 2023-08-17 DIAGNOSIS — M25.511 CHRONIC RIGHT SHOULDER PAIN: Primary | ICD-10-CM

## 2023-08-17 DIAGNOSIS — M25.511 ACUTE PAIN OF RIGHT SHOULDER: ICD-10-CM

## 2023-08-17 DIAGNOSIS — G89.29 CHRONIC LEFT SHOULDER PAIN: ICD-10-CM

## 2023-08-17 DIAGNOSIS — M25.512 CHRONIC LEFT SHOULDER PAIN: ICD-10-CM

## 2023-08-17 PROCEDURE — 97140 MANUAL THERAPY 1/> REGIONS: CPT

## 2023-08-17 PROCEDURE — 97110 THERAPEUTIC EXERCISES: CPT

## 2023-08-17 PROCEDURE — 97112 NEUROMUSCULAR REEDUCATION: CPT

## 2023-08-17 NOTE — PROGRESS NOTES
Daily Note     Today's date: 2023  Patient name: Jayro Ovalles  : 1945  MRN: [id number]  Referring provider: Rodrick Vásquez MD  Dx:   Encounter Diagnosis     ICD-10-CM    1. Chronic right shoulder pain  M25.511     G89.29       2. Chronic left shoulder pain  M25.512     G89.29       3. Acute pain of right shoulder  M25.511       4. Acute pain of left shoulder  M25.512           Start Time: 1005  Stop Time: 1055  Total time in clinic (min): 50 minutes    Subjective: Patient reports no UT tightness, soreness or pain prior to therapy. He notes a pinch with shoulder abd (superior sub acromial space) L > R.    Objective: See treatment diary below    Assessment: Patient responding well to the current PT POC. As Judy Aguirre continues to maintain a good postural position and improve strength over time his UT dominance has become less intense. Plan: Cont. POC to address postural abnormalities and scapular weakness. Precautions:  PMHx: HTN, CKD II  Dx: chronic B subacromial impingement    Manuals        8 8/10 815   Gr IV prone T1-8 PA mobs 1x20 ea  WS       1x40 ea 1x40 ea 1x40 ea  KF   [place] B UT, post scalenes 8 min         8 min   Manual c/s retraction seated OP        2x20 3x10    Supine c/s retraction/distraction/extension 1x10  WS       1x5 1x10                                           Neuro Re-Ed             Seated t/s extension 5x10       3x10 5x10 5x10                C/s retraction OP extension 5x10       6x10 5x10 5x10   FF AAROM             FF AAROM ecc lowering                          ABD AAROM             ABD AAROM ecc lowering                          B TB T's R/  3x20       R/  3x20 R/  3x20 R/  3x20   Prone Y's U/  3x15       U/  3x15 U/  3x15 U/  3x15                                          Ther Ex             UT stretch B/  10"x3       B/  10"x3 B/  10"x3 B/  10"x3   Doorway pec stretch 10"x3       10"x3 10"x3 10"x3   TB ER 90/90 Blue  L/  3x15  Green  R  3x15 Blue  L/  3x15  Green  R  3x15 Blue  L/  3x15  Green  R  3x15 Blue  L/  3x15  Green  R  3x15                                                                                 Ther Activity                                       Gait Training                                       Modalities

## 2023-08-22 ENCOUNTER — OFFICE VISIT (OUTPATIENT)
Dept: PHYSICAL THERAPY | Facility: CLINIC | Age: 78
End: 2023-08-22
Payer: MEDICARE

## 2023-08-22 DIAGNOSIS — M25.511 CHRONIC RIGHT SHOULDER PAIN: Primary | ICD-10-CM

## 2023-08-22 DIAGNOSIS — G89.29 CHRONIC RIGHT SHOULDER PAIN: Primary | ICD-10-CM

## 2023-08-22 DIAGNOSIS — M25.511 ACUTE PAIN OF RIGHT SHOULDER: ICD-10-CM

## 2023-08-22 DIAGNOSIS — M25.512 ACUTE PAIN OF LEFT SHOULDER: ICD-10-CM

## 2023-08-22 DIAGNOSIS — G89.29 CHRONIC LEFT SHOULDER PAIN: ICD-10-CM

## 2023-08-22 DIAGNOSIS — M25.512 CHRONIC LEFT SHOULDER PAIN: ICD-10-CM

## 2023-08-22 PROCEDURE — 97110 THERAPEUTIC EXERCISES: CPT

## 2023-08-22 PROCEDURE — 97112 NEUROMUSCULAR REEDUCATION: CPT

## 2023-08-22 NOTE — PROGRESS NOTES
Daily Note     Today's date: 2023  Patient name: Halie Rey  : 1945  MRN: [id number]  Referring provider: Gaby Moore MD  Dx:   Encounter Diagnosis     ICD-10-CM    1. Chronic right shoulder pain  M25.511     G89.29       2. Chronic left shoulder pain  M25.512     G89.29       3. Acute pain of right shoulder  M25.511       4. Acute pain of left shoulder  M25.512           Start Time: 1000  Stop Time: 1050  Total time in clinic (min): 50 minutes    Subjective: Patient states, "I had increased tingling/numbness in my R hand on Saturday. I forgot to try my neck exercises to see if that would help. Today the hand is back to it's normal but both shoulders are very achy". Objective: See treatment diary below    Assessment: Modified t-spine ext over the chair by lower his arms to decrease UT tension. Good response to modifications and good response to therapy this visit noting less UT aches. Patient more challenged with TB exercises this visit most likely from fatigue. Plan: Cont. POC to address postural abnormalities and scapular weakness. Precautions:  PMHx: HTN, CKD II  Dx: chronic B subacromial impingement    Manuals 8/17 8/22      8/8 8/10 8/15   Gr IV prone T1-8 PA mobs 1x20 ea  WS       1x40 ea 1x40 ea 1x40 ea  KF   [place] B UT, post scalenes 8 min 8 min        8 min   Manual c/s retraction seated OP        2x20 3x10    Supine c/s retraction/distraction/extension 1x10  WS       1x5 1x10                                           Neuro Re-Ed             Seated t/s extension 5x10 5x10      3x10 5x10 5x10                C/s retraction OP extension 5x10 5x10      6x10 5x10 5x10   FF AAROM             FF AAROM ecc lowering                          ABD AAROM             ABD AAROM ecc lowering                          B TB T's R/  3x20 R/  3x20      R/  3x20 R/  3x20 R/  3x20   Prone Y's U/  3x15 U/  3x15      U/  3x15 U/  3x15 U/  3x15                                          Ther Ex             UT stretch B/  10"x3 B/  10"x3      B/  10"x3 B/  10"x3 B/  10"x3   Doorway pec stretch 10"x3 10"x3      10"x3 10"x3 10"x3   TB ER 90/90 Blue  L/  3x15  Green  R  3x15 Blue  L/  3x15  Green  R  3x15      Blue  L/  3x15  Green  R  3x15 Blue  L/  3x15  Green  R  3x15 Blue  L/  3x15  Green  R  3x15                                                                                 Ther Activity                                       Gait Training                                       Modalities

## 2023-08-24 ENCOUNTER — OFFICE VISIT (OUTPATIENT)
Dept: PHYSICAL THERAPY | Facility: CLINIC | Age: 78
End: 2023-08-24
Payer: MEDICARE

## 2023-08-24 DIAGNOSIS — G89.29 CHRONIC RIGHT SHOULDER PAIN: Primary | ICD-10-CM

## 2023-08-24 DIAGNOSIS — G89.29 CHRONIC LEFT SHOULDER PAIN: ICD-10-CM

## 2023-08-24 DIAGNOSIS — M25.512 CHRONIC LEFT SHOULDER PAIN: ICD-10-CM

## 2023-08-24 DIAGNOSIS — M25.511 CHRONIC RIGHT SHOULDER PAIN: Primary | ICD-10-CM

## 2023-08-24 DIAGNOSIS — M25.512 ACUTE PAIN OF LEFT SHOULDER: ICD-10-CM

## 2023-08-24 DIAGNOSIS — M25.511 ACUTE PAIN OF RIGHT SHOULDER: ICD-10-CM

## 2023-08-24 PROCEDURE — 97110 THERAPEUTIC EXERCISES: CPT

## 2023-08-24 PROCEDURE — 97112 NEUROMUSCULAR REEDUCATION: CPT

## 2023-08-24 PROCEDURE — 97140 MANUAL THERAPY 1/> REGIONS: CPT

## 2023-08-24 NOTE — PROGRESS NOTES
Daily Note     Today's date: 2023  Patient name: Davian Aldana  : 1945  MRN: [id number]  Referring provider: Germán Harry MD  Dx:   Encounter Diagnosis     ICD-10-CM    1. Chronic right shoulder pain  M25.511     G89.29       2. Chronic left shoulder pain  M25.512     G89.29       3. Acute pain of right shoulder  M25.511       4. Acute pain of left shoulder  M25.512           Start Time: 1000  Stop Time: 1040  Total time in clinic (min): 40 minutes    Subjective: Patient states, "The pain keeps moving. I have no pain in my UT. It's at the front of the shoulder and slightly outside". Objective: See treatment diary below    Assessment: Good response to modified t-spine ext over the chair by lower his arms to decrease UT tension. Had Euna Renu start with the pec stretch at the doorway which diminished his anterior sub acromial pain. Patient did well with strengthening and all other TE listed below. No pain, soreness, stiffness or aches following therapy. Plan: Cont. POC to address postural abnormalities and scapular weakness. Precautions:  PMHx: HTN, CKD II  Dx: chronic B subacromial impingement    Manuals 8/17 8/22 8/24     8/8 8/10 8/15   Gr IV prone T1-8 PA mobs 1x20 ea  WS       1x40 ea 1x40 ea 1x40 ea  KF   [place] B UT, post scalenes 8 min 8 min 8 min       8 min   Manual c/s retraction seated OP        2x20 3x10    Supine c/s retraction/distraction/extension 1x10  WS       1x5 1x10                                           Neuro Re-Ed             Seated t/s extension 5x10 5x10 3x10     3x10 5x10 5x10                C/s retraction OP extension 5x10 5x10 3x10     6x10 5x10 5x10   FF AAROM             FF AAROM ecc lowering                          ABD AAROM             ABD AAROM ecc lowering                          B TB T's R/  3x20 R/  3x20 R/  3x20     R/  3x20 R/  3x20 R/  3x20   Prone Y's U/  3x15 U/  3x15 U/  3x15     U/  3x15 U/  3x15 U/  3x15 Ther Ex             UT stretch B/  10"x3 B/  10"x3 B/  10"x3     B/  10"x3 B/  10"x3 B/  10"x3   Doorway pec stretch 10"x3 10"x3 10"x3     10"x3 10"x3 10"x3   TB ER 90/90 Blue  L/  3x15  Green  R  3x15 Blue  L/  3x15  Green  R  3x15 Blue  L/  3x15  Green  R  3x15     Blue  L/  3x15  Green  R  3x15 Blue  L/  3x15  Green  R  3x15 Blue  L/  3x15  Green  R  3x15                                                                                 Ther Activity                                       Gait Training                                       Modalities

## 2023-08-29 ENCOUNTER — OFFICE VISIT (OUTPATIENT)
Dept: PHYSICAL THERAPY | Facility: CLINIC | Age: 78
End: 2023-08-29
Payer: MEDICARE

## 2023-08-29 DIAGNOSIS — M25.511 ACUTE PAIN OF RIGHT SHOULDER: ICD-10-CM

## 2023-08-29 DIAGNOSIS — G89.29 CHRONIC LEFT SHOULDER PAIN: ICD-10-CM

## 2023-08-29 DIAGNOSIS — G89.29 CHRONIC RIGHT SHOULDER PAIN: Primary | ICD-10-CM

## 2023-08-29 DIAGNOSIS — M25.512 ACUTE PAIN OF LEFT SHOULDER: ICD-10-CM

## 2023-08-29 DIAGNOSIS — M25.512 CHRONIC LEFT SHOULDER PAIN: ICD-10-CM

## 2023-08-29 DIAGNOSIS — M25.511 CHRONIC RIGHT SHOULDER PAIN: Primary | ICD-10-CM

## 2023-08-29 PROCEDURE — 97112 NEUROMUSCULAR REEDUCATION: CPT

## 2023-08-29 PROCEDURE — 97110 THERAPEUTIC EXERCISES: CPT

## 2023-08-29 NOTE — PROGRESS NOTES
Daily Note     Today's date: 2023  Patient name: Jeane Martínez  : 1945  MRN: [id number]  Referring provider: Kateryna Serna MD  Dx:   Encounter Diagnosis     ICD-10-CM    1. Chronic right shoulder pain  M25.511     G89.29       2. Chronic left shoulder pain  M25.512     G89.29       3. Acute pain of right shoulder  M25.511       4. Acute pain of left shoulder  M25.512           Start Time: 1000  Stop Time: 1040  Total time in clinic (min): 40 minutes    Subjective: Patient states, "I feel really good. I had no pain since last visit". Objective: See treatment diary below    Assessment: Patient has responded well to the current plan listed below with minor modifications. Continue to progress as able. Plan: Cont. POC to address postural abnormalities and scapular weakness. Precautions:  PMHx: HTN, CKD II  Dx: chronic B subacromial impingement    Manuals          Gr IV prone T1-8 PA mobs 1x20 ea  WS            [place] B UT, post scalenes 8 min 8 min 8 min 8 min         Manual c/s retraction seated OP             Supine c/s retraction/distraction/extension 1x10  WS                                                   Neuro Re-Ed             Seated t/s extension 5x10 5x10 3x10 3x10                      C/s retraction OP extension 5x10 5x10 3x10 3x10         FF AAROM             FF AAROM ecc lowering                          ABD AAROM             ABD AAROM ecc lowering                          B TB T's R/  3x20 R/  3x20 R/  3x20 R/  3x20         Prone Y's U/  3x15 U/  3x15 U/  3x15 U/  3x15                                                Ther Ex             UT stretch B/  10"x3 B/  10"x3 B/  10"x3          Doorway pec stretch 10"x3 10"x3 10"x3 10"x3         TB ER 90/90 Blue  L/  3x15  Green  R  3x15 Blue  L/  3x15  Green  R  3x15 Blue  L/  3x15  Green  R  3x15 Blue  L/  3x15  Green  R  3x15 Ther Activity                                       Gait Training                                       Modalities

## 2023-08-31 ENCOUNTER — OFFICE VISIT (OUTPATIENT)
Dept: PHYSICAL THERAPY | Facility: CLINIC | Age: 78
End: 2023-08-31
Payer: MEDICARE

## 2023-08-31 DIAGNOSIS — G89.29 CHRONIC RIGHT SHOULDER PAIN: Primary | ICD-10-CM

## 2023-08-31 DIAGNOSIS — G89.29 CHRONIC LEFT SHOULDER PAIN: ICD-10-CM

## 2023-08-31 DIAGNOSIS — M25.512 CHRONIC LEFT SHOULDER PAIN: ICD-10-CM

## 2023-08-31 DIAGNOSIS — M25.511 CHRONIC RIGHT SHOULDER PAIN: Primary | ICD-10-CM

## 2023-08-31 DIAGNOSIS — M25.512 ACUTE PAIN OF LEFT SHOULDER: ICD-10-CM

## 2023-08-31 DIAGNOSIS — M25.511 ACUTE PAIN OF RIGHT SHOULDER: ICD-10-CM

## 2023-08-31 PROCEDURE — 97110 THERAPEUTIC EXERCISES: CPT

## 2023-08-31 PROCEDURE — 97112 NEUROMUSCULAR REEDUCATION: CPT

## 2023-09-05 ENCOUNTER — OFFICE VISIT (OUTPATIENT)
Dept: PHYSICAL THERAPY | Facility: CLINIC | Age: 78
End: 2023-09-05
Payer: MEDICARE

## 2023-09-05 DIAGNOSIS — M25.511 ACUTE PAIN OF RIGHT SHOULDER: ICD-10-CM

## 2023-09-05 DIAGNOSIS — M25.512 CHRONIC LEFT SHOULDER PAIN: ICD-10-CM

## 2023-09-05 DIAGNOSIS — M25.512 ACUTE PAIN OF LEFT SHOULDER: ICD-10-CM

## 2023-09-05 DIAGNOSIS — G89.29 CHRONIC LEFT SHOULDER PAIN: ICD-10-CM

## 2023-09-05 DIAGNOSIS — M25.511 CHRONIC RIGHT SHOULDER PAIN: Primary | ICD-10-CM

## 2023-09-05 DIAGNOSIS — G89.29 CHRONIC RIGHT SHOULDER PAIN: Primary | ICD-10-CM

## 2023-09-05 PROCEDURE — 97140 MANUAL THERAPY 1/> REGIONS: CPT

## 2023-09-05 PROCEDURE — 97112 NEUROMUSCULAR REEDUCATION: CPT

## 2023-09-05 PROCEDURE — 97110 THERAPEUTIC EXERCISES: CPT

## 2023-09-05 NOTE — PROGRESS NOTES
Daily Note     Today's date: 2023  Patient name: Rudy Ochoa  : 1945  MRN: [id number]  Referring provider: Gosia Newman MD  Dx:   Encounter Diagnosis     ICD-10-CM    1. Chronic right shoulder pain  M25.511     G89.29       2. Chronic left shoulder pain  M25.512     G89.29       3. Acute pain of right shoulder  M25.511       4. Acute pain of left shoulder  M25.512           Start Time: 1730  Stop Time: 0  Total time in clinic (min): 40 minutes    Subjective: Patient states, "I did well over the weekend. I used my exercises to help the little amount of discomfort I had this weekend". He notes his shoulders feel fatigued prior to therapy with no pain. Objective: See treatment diary below    Assessment: Patient has responded well to the current plan listed below with minor modifications. Continue to progress as able. Patient did well with HEP instruction and is utilizing his HEP appropriately to eliminate pain and soreness. Plan: Cont. POC to address postural abnormalities and scapular weakness. Precautions:  PMHx: HTN, CKD II  Dx: chronic B subacromial impingement    Manuals        Gr IV prone T1-8 PA mobs 1x20 ea  WS            Graston B UT, post scalenes 8 min 8 min 8 min 8 min 8 min 8 min       Manual c/s retraction seated OP             Supine c/s retraction/distraction/extension 1x10  WS                                                   Neuro Re-Ed             Seated t/s extension 5x10 5x10 3x10 3x10 3x10 3x10                    C/s retraction OP extension 5x10 5x10 3x10 3x10 3x10 3x10       FF AAROM             FF AAROM ecc lowering                          ABD AAROM             ABD AAROM ecc lowering                          B TB T's R/  3x20 R/  3x20 R/  3x20 R/  3x20 R  3x20 R  3x20       Prone Y's U/  3x15 U/  3x15 U/  3x15 U/  3x15 U/  3x15 U/  3x15                                              Ther Ex             UT stretch B/  10"x3 B/  10"x3 B/  10"x3          Doorway pec stretch 10"x3 10"x3 10"x3 10"x3 10"x3 10"x3       TB ER 90/90 Blue  L/  3x15  Green  R  3x15 Blue  L/  3x15  Green  R  3x15 Blue  L/  3x15  Green  R  3x15 Blue  L/  3x15  Green  R  3x15 Blue  L/  3x15  Green  R  3x15 Blue  L/  3x15  Green  R  3x15                                                                                     Ther Activity                                       Gait Training                                       Modalities

## 2023-09-07 ENCOUNTER — OFFICE VISIT (OUTPATIENT)
Dept: PHYSICAL THERAPY | Facility: CLINIC | Age: 78
End: 2023-09-07
Payer: MEDICARE

## 2023-09-07 DIAGNOSIS — M25.512 CHRONIC LEFT SHOULDER PAIN: ICD-10-CM

## 2023-09-07 DIAGNOSIS — G89.29 CHRONIC LEFT SHOULDER PAIN: ICD-10-CM

## 2023-09-07 DIAGNOSIS — E78.2 MIXED HYPERLIPIDEMIA: ICD-10-CM

## 2023-09-07 DIAGNOSIS — G89.29 CHRONIC RIGHT SHOULDER PAIN: Primary | ICD-10-CM

## 2023-09-07 DIAGNOSIS — M25.511 CHRONIC RIGHT SHOULDER PAIN: Primary | ICD-10-CM

## 2023-09-07 PROCEDURE — 97112 NEUROMUSCULAR REEDUCATION: CPT | Performed by: PHYSICAL THERAPIST

## 2023-09-07 PROCEDURE — 97110 THERAPEUTIC EXERCISES: CPT | Performed by: PHYSICAL THERAPIST

## 2023-09-07 NOTE — PROGRESS NOTES
Daily Note     Today's date: 2023  Patient name: Andressa Rdz  : 1945  MRN: [id number]  Referring provider: Celso De La Cruz MD  Dx:   Encounter Diagnosis     ICD-10-CM    1. Chronic right shoulder pain  M25.511     G89.29       2. Chronic left shoulder pain  M25.512     G89.29                      Subjective: Pt reports B UT "achiness" when he is less attentive to his posture, now able to reach [facility] without pain or restriction. Objective: See treatment diary below    Assessment: Pt continues to demonstrate postural stiffness but with improved awareness, neuromuscular control, and pain. Plan: RE nv.     Precautions:  PMHx: HTN, CKD II  Dx: chronic B subacromial impingement    Manuals       Gr IV prone T1-8 PA mobs 1x20 ea  WS      1x20 ea      Graston B UT, post scalenes 8 min 8 min 8 min 8 min 8 min 8 min       Manual c/s retraction seated OP       3x10      Supine c/s retraction/distraction/extension 1x10  WS                                                   Neuro Re-Ed             Seated t/s extension 5x10 5x10 3x10 3x10 3x10 3x10 3x10                   C/s retraction OP extension 5x10 5x10 3x10 3x10 3x10 3x10 3x10      FF AAROM             FF AAROM ecc lowering                          ABD AAROM             ABD AAROM ecc lowering                          B TB T's R/  3x20 R/  3x20 R/  3x20 R/  3x20 R  3x20 R  3x20 G/  3x10      Prone Y's U/  3x15 U/  3x15 U/  3x15 U/  3x15 U/  3x15 U/  3x15 U/  3x15                                             Ther Ex             UT stretch B/  10"x3 B/  10"x3 B/  10"x3          Doorway pec stretch 10"x3 10"x3 10"x3 10"x3 10"x3 10"x3 20"x3      TB ER 90/90 Blue  L/  3x15  Green  R  3x15 Blue  L/  3x15  Green  R  3x15 Blue  L/  3x15  Green  R  3x15 Blue  L/  3x15  Green  R  3x15 Blue  L/  3x15  Green  R  3x15 Blue  L/  3x15  Green  R  3x15 Blue  L/  3x15  Green  R  3x15 Ther Activity                                       Gait Training                                       Modalities

## 2023-09-08 RX ORDER — GEMFIBROZIL 600 MG/1
600 TABLET, FILM COATED ORAL 2 TIMES DAILY
Qty: 180 TABLET | Refills: 1 | OUTPATIENT
Start: 2023-09-08

## 2023-09-11 DIAGNOSIS — E78.2 MIXED HYPERLIPIDEMIA: ICD-10-CM

## 2023-09-11 RX ORDER — GEMFIBROZIL 600 MG/1
600 TABLET, FILM COATED ORAL 2 TIMES DAILY
Qty: 180 TABLET | Refills: 1 | Status: SHIPPED | OUTPATIENT
Start: 2023-09-11

## 2023-09-12 ENCOUNTER — EVALUATION (OUTPATIENT)
Dept: PHYSICAL THERAPY | Facility: CLINIC | Age: 78
End: 2023-09-12
Payer: MEDICARE

## 2023-09-12 DIAGNOSIS — G89.29 CHRONIC LEFT SHOULDER PAIN: ICD-10-CM

## 2023-09-12 DIAGNOSIS — M25.512 ACUTE PAIN OF LEFT SHOULDER: ICD-10-CM

## 2023-09-12 DIAGNOSIS — M25.512 CHRONIC LEFT SHOULDER PAIN: ICD-10-CM

## 2023-09-12 DIAGNOSIS — M25.511 ACUTE PAIN OF RIGHT SHOULDER: ICD-10-CM

## 2023-09-12 DIAGNOSIS — M25.511 CHRONIC RIGHT SHOULDER PAIN: Primary | ICD-10-CM

## 2023-09-12 DIAGNOSIS — G89.29 CHRONIC RIGHT SHOULDER PAIN: Primary | ICD-10-CM

## 2023-09-12 PROCEDURE — 97112 NEUROMUSCULAR REEDUCATION: CPT | Performed by: PHYSICAL THERAPIST

## 2023-09-12 PROCEDURE — 97140 MANUAL THERAPY 1/> REGIONS: CPT | Performed by: PHYSICAL THERAPIST

## 2023-09-12 PROCEDURE — 97110 THERAPEUTIC EXERCISES: CPT | Performed by: PHYSICAL THERAPIST

## 2023-09-12 NOTE — PROGRESS NOTES
PT Re-Evaluation     Today's date: 2023  Patient name: Katy Monk  : 1945  MRN: [id number]  Referring provider: Yasemin Beasley MD  Dx:   Encounter Diagnosis     ICD-10-CM    1. Chronic right shoulder pain  M25.511     G89.29       2. Chronic left shoulder pain  M25.512     G89.29       3. Acute pain of right shoulder  M25.511       4. Acute pain of left shoulder  M25.512                      Assessment  Assessment details: Pt demonstrates improved c/s ROM, postural awareness, function, and pain. His postural control and cervical and thoracic ROM are improved but not yet sufficient for his required OH lifting and reaching demands. He will continue to benefit from skilled PT services to address his remaining impairments in order to further improve pain. Impairments: abnormal muscle firing, abnormal or restricted ROM, abnormal movement, activity intolerance, impaired physical strength, lacks appropriate home exercise program, pain with function, poor posture  and poor body mechanics  Understanding of Dx/Px/POC: good   Prognosis: good    Goals  STG - 4 wks  1) pt will demonstrate 90 deg active FE (met)  2) pt will improve pain 50% (met)    LTG - 8-12 wks  1) pt will demonstrate 135 deg active FE (met)  2) pt will improve pain 90% (partially met - R only)    Plan  Planned modality interventions: low level laser therapy  Planned therapy interventions: joint mobilization, manual therapy, body mechanics training, neuromuscular re-education, strengthening, stretching, patient education, postural training, therapeutic activities, therapeutic exercise, home exercise program, functional ROM exercises and flexibility  Frequency: 2x week  Duration in weeks: 4  Treatment plan discussed with: patient        Subjective Evaluation    History of Present Illness  Mechanism of injury: Pt is a 68 y.o. male with PMHx significant for HTN, CKD II.  He reports significantly improved ability to perform OH reaching and lifting and notes no pain on the R > 1 wk. 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   Patient Goals  Patient goals for therapy: decreased pain, increased motion, return to sport/leisure activities and increased strength    Pain  Current pain ratin  At best pain ratin  At worst pain ratin  Location: L superior, posterior, lateral shoulder  Quality: dull ache and sharp  Relieving factors: rest  Aggravating factors: overhead activity and lifting  Progression: improved    Hand dominance: right      Diagnostic Tests  No diagnostic tests performed  Treatments  Previous treatment: physical therapy  Current treatment: physical therapy        Objective     Postural Observations  Seated posture: poor  Standing posture: poor  Correction of posture: makes symptoms better        Active Range of Motion   Cervical/Thoracic Spine       Cervical  Subcranial protraction:   Restriction level: minimal  Subcranial retraction:   Restriction level: moderate  Flexion:  Restriction level: minimal  Extension:  Restriction level: moderate  Left lateral flexion:  Restriction level: moderate  Right lateral flexion:  Restriction level moderate  Left Shoulder   Flexion: 155 degrees   Extension: 20 degrees   Abduction: 155 degrees with pain  External rotation BTH: C7   Internal rotation BTB: T11     Right Shoulder   Flexion: 155 degrees   Extension: 25 degrees   Abduction: 155 degrees   External rotation BTH: C7   Internal rotation BTB: T11     Additional Active Range of Motion Details  Repeated motions: L:  IR: produced, no worse  EXT: produced, improves, better  Manual c/s retraction OP: abolished    Passive Range of Motion   Left Shoulder   Flexion: 155 degrees   Abduction: 155 degrees   External rotation 90°: 80 degrees   Internal rotation 90°: 60 degrees     Right Shoulder   Flexion: 155 degrees   Abduction: 155 degrees   External rotation 90°: 80 degrees   Internal rotation 90°: 40 degrees     Joint Play   Left Shoulder  Hypomobile in the posterior capsule and inferior capsule. Right Shoulder  Joints within functional limits are the posterior capsule and inferior capsule. Hypomobile: T1, T2, T3, T4, T5, T6, T7 and T8   Mechanical Assessment    Cervical    Seated retraction: repeated movements   Pain location: centralized  Pain intensity: better  Pain level: abolished  Seated Extension: repeated movements  Pain location: centralized  Pain intensity: better  Pain level: decreased    Thoracic    Seated extension: repeated movements  Pain location: no change    Lumbar      Strength/Myotome Testing     Left Shoulder     Planes of Motion   Flexion: 4+   Abduction: 4+   External rotation at 0°: 4+   External rotation at 90°: 4+   Internal rotation at 0°: 4+   Internal rotation at 90°: 4+     Right Shoulder     Planes of Motion   Flexion: 4   Abduction: 4   External rotation at 0°: 4   External rotation at 90°: 4   Internal rotation at 0°: 4+   Internal rotation at 90°: 4-     Tests     Left Shoulder   Positive Hawkin's and painful arc (abolished with manual c/s retraction OP). Negative apprehension, drop arm, external rotation lag sign and Neer's. Right Shoulder   Negative drop arm and external rotation lag sign. Precautions:  PMHx: HTN, CKD II  Dx: chronic B subacromial impingement, lower c/s derangement with a retraction, extension DP    Manuals 9/12            Gr IV prone T1-8 PA mobs 1x30 ea            Manual c/s retraction clinician OP 3x10                                                                             Neuro Re-Ed             Seated t/s extension 3x10                         C/s retraction OP extension 3x10                                                                                          B TB T's G/  3x15            Prone Y's Prone t/s extension 3x10                                      Ther Ex                          Doorway pec stretch 10"x3            TB ER 90/90 Blue  L/  3x15  Green  R/  3x15 Blue  L/  3x15  R/  3x10                                                                                         Ther Activity                                       Gait Training                                       Modalities Low Risk

## 2023-09-14 ENCOUNTER — TELEPHONE (OUTPATIENT)
Dept: FAMILY MEDICINE CLINIC | Facility: MEDICAL CENTER | Age: 78
End: 2023-09-14

## 2023-09-14 ENCOUNTER — APPOINTMENT (OUTPATIENT)
Dept: PHYSICAL THERAPY | Facility: CLINIC | Age: 78
End: 2023-09-14
Payer: MEDICARE

## 2023-09-14 NOTE — TELEPHONE ENCOUNTER
Please call patient, may speak to wife. We had discussed at our last visit that we will not continue gemfibrozil anymore. Just finish what you have.

## 2023-09-15 DIAGNOSIS — E78.2 MIXED HYPERLIPIDEMIA: ICD-10-CM

## 2023-09-15 NOTE — TELEPHONE ENCOUNTER
Stephanie Rodriguez returned Libia's call - relayed 's message - med below was discontinued which was discussed at last visit and can finish what he has. Patient said, "ok".     gemfibrozil (LOPID) 600 mg tablet

## 2023-09-18 RX ORDER — PRAVASTATIN SODIUM 10 MG
10 TABLET ORAL DAILY
Qty: 90 TABLET | Refills: 1 | Status: SHIPPED | OUTPATIENT
Start: 2023-09-18

## 2023-09-19 ENCOUNTER — OFFICE VISIT (OUTPATIENT)
Dept: PHYSICAL THERAPY | Facility: CLINIC | Age: 78
End: 2023-09-19
Payer: MEDICARE

## 2023-09-19 DIAGNOSIS — M25.511 CHRONIC RIGHT SHOULDER PAIN: Primary | ICD-10-CM

## 2023-09-19 DIAGNOSIS — G89.29 CHRONIC RIGHT SHOULDER PAIN: Primary | ICD-10-CM

## 2023-09-19 DIAGNOSIS — M25.512 ACUTE PAIN OF LEFT SHOULDER: ICD-10-CM

## 2023-09-19 DIAGNOSIS — M25.512 CHRONIC LEFT SHOULDER PAIN: ICD-10-CM

## 2023-09-19 DIAGNOSIS — M25.511 ACUTE PAIN OF RIGHT SHOULDER: ICD-10-CM

## 2023-09-19 DIAGNOSIS — G89.29 CHRONIC LEFT SHOULDER PAIN: ICD-10-CM

## 2023-09-19 PROCEDURE — 97110 THERAPEUTIC EXERCISES: CPT | Performed by: PHYSICAL THERAPIST

## 2023-09-19 PROCEDURE — 97112 NEUROMUSCULAR REEDUCATION: CPT | Performed by: PHYSICAL THERAPIST

## 2023-09-19 NOTE — PROGRESS NOTES
Daily Note     Today's date: 2023  Patient name: Art Ingram  : 1945  MRN: [id number]  Referring provider: Frdey Mendez MD  Dx:   Encounter Diagnosis     ICD-10-CM    1. Chronic right shoulder pain  M25.511     G89.29       2. Chronic left shoulder pain  M25.512     G89.29       3. Acute pain of right shoulder  M25.511       4. Acute pain of left shoulder  M25.512                      Subjective: Pt reports no R shoulder pain, still has pain with active ABD on the L.    Objective: See treatment diary below    Assessment: Pt able to abolish pain and normalize ABD AROM and arthrokinematics following c/s retraction, extension 3x10. Plan: Cont. POC for c/s and thoracic extension. Precautions:  PMHx: HTN, CKD II  Dx: chronic B subacromial impingement, lower c/s derangement with a retraction, extension DP    Manuals            Gr IV prone T1-8 PA mobs 1x30 ea 1x30 ea           Manual c/s retraction clinician OP 3x10 3x10           Manual supine c/s distraction, retraction, extension  1x5                                                               Neuro Re-Ed             Seated t/s extension 3x10 3x10                        C/s retraction OP extension 3x10 3x10                                                                                         B TB T's G/  3x15 G/  3x15           Prone Y's             Prone t/s extension 3x10 3x10 3x15                                    Ther Ex                          Doorway pec stretch 10"x3 10"x3           TB ER 90/90 Blue  L/  3x15  Green  R/  3x15 Blue  L/  3x15  R/  3x10                                                                                         Ther Activity                                       Gait Training                                       Modalities

## 2023-09-21 ENCOUNTER — OFFICE VISIT (OUTPATIENT)
Dept: PHYSICAL THERAPY | Facility: CLINIC | Age: 78
End: 2023-09-21
Payer: MEDICARE

## 2023-09-21 DIAGNOSIS — M25.512 CHRONIC LEFT SHOULDER PAIN: ICD-10-CM

## 2023-09-21 DIAGNOSIS — M25.511 CHRONIC RIGHT SHOULDER PAIN: Primary | ICD-10-CM

## 2023-09-21 DIAGNOSIS — G89.29 CHRONIC LEFT SHOULDER PAIN: ICD-10-CM

## 2023-09-21 DIAGNOSIS — G89.29 CHRONIC RIGHT SHOULDER PAIN: Primary | ICD-10-CM

## 2023-09-21 PROCEDURE — 97112 NEUROMUSCULAR REEDUCATION: CPT | Performed by: PHYSICAL THERAPIST

## 2023-09-21 PROCEDURE — 97110 THERAPEUTIC EXERCISES: CPT | Performed by: PHYSICAL THERAPIST

## 2023-09-21 PROCEDURE — 97140 MANUAL THERAPY 1/> REGIONS: CPT | Performed by: PHYSICAL THERAPIST

## 2023-09-21 NOTE — PROGRESS NOTES
Daily Note     Today's date: 2023  Patient name: Jessica Can  : 1945  MRN: [id number]  Referring provider: Joanne Newby MD  Dx:   Encounter Diagnosis     ICD-10-CM    1. Chronic right shoulder pain  M25.511     G89.29       2. Chronic left shoulder pain  M25.512     G89.29                      Subjective: Pt reports L>R shoulder pain after putting up and taking down a volleyball net Tuesday. Objective: See treatment diary below    Assessment: Pt continues to be able to abolish pain and normalize ABD AROM and arthrokinematics following c/s retraction, extension. Plan: Cont. POC for c/s and thoracic extension. Precautions:  PMHx: HTN, CKD II  Dx: chronic B subacromial impingement, lower c/s derangement with a retraction, extension DP    Manuals           Gr IV prone T1-8 PA mobs 1x30 ea 1x30 ea 1x30 ea          Manual c/s retraction clinician OP 3x10 3x10 3x10          Manual supine c/s distraction, retraction, extension  1x5 1x5                                                              Neuro Re-Ed             Seated t/s extension 3x10 3x10 3x10                       C/s retraction OP extension 3x10 3x10 3x10 5x10                                                                                       B TB T's G/  3x15 G/  3x15 G/  3x15          Prone Y's             Prone t/s extension 3x10 3x10 3x10 3x15                                   Ther Ex                          Doorway pec stretch 10"x3 10"x3 10"x3          TB ER 90/90 Blue  L/  3x15  Green  R/  3x15 Blue  L/  3x15  R/  3x10 Blue  B/  3x15                                                                                        Ther Activity                                       Gait Training                                       Modalities

## 2023-09-26 ENCOUNTER — OFFICE VISIT (OUTPATIENT)
Dept: PHYSICAL THERAPY | Facility: CLINIC | Age: 78
End: 2023-09-26
Payer: MEDICARE

## 2023-09-26 ENCOUNTER — APPOINTMENT (OUTPATIENT)
Dept: LAB | Facility: CLINIC | Age: 78
End: 2023-09-26
Payer: MEDICARE

## 2023-09-26 DIAGNOSIS — G89.29 CHRONIC RIGHT SHOULDER PAIN: Primary | ICD-10-CM

## 2023-09-26 DIAGNOSIS — G51.31 HEMIFACIAL SPASM OF RIGHT SIDE OF FACE: ICD-10-CM

## 2023-09-26 DIAGNOSIS — G89.29 CHRONIC LEFT SHOULDER PAIN: ICD-10-CM

## 2023-09-26 DIAGNOSIS — M25.511 CHRONIC RIGHT SHOULDER PAIN: Primary | ICD-10-CM

## 2023-09-26 DIAGNOSIS — M25.512 CHRONIC LEFT SHOULDER PAIN: ICD-10-CM

## 2023-09-26 LAB
BUN SERPL-MCNC: 16 MG/DL (ref 5–25)
CREAT SERPL-MCNC: 1.08 MG/DL (ref 0.6–1.3)
GFR SERPL CREATININE-BSD FRML MDRD: 65 ML/MIN/1.73SQ M

## 2023-09-26 PROCEDURE — 84520 ASSAY OF UREA NITROGEN: CPT

## 2023-09-26 PROCEDURE — 36415 COLL VENOUS BLD VENIPUNCTURE: CPT

## 2023-09-26 PROCEDURE — 82565 ASSAY OF CREATININE: CPT

## 2023-09-26 PROCEDURE — 97112 NEUROMUSCULAR REEDUCATION: CPT | Performed by: PHYSICAL THERAPIST

## 2023-09-26 PROCEDURE — 97110 THERAPEUTIC EXERCISES: CPT | Performed by: PHYSICAL THERAPIST

## 2023-09-26 NOTE — PROGRESS NOTES
Daily Note     Today's date: 2023  Patient name: Mary Beth Beach  : 1945  MRN: [id number]  Referring provider: Tata Franks MD  Dx:   Encounter Diagnosis     ICD-10-CM    1. Chronic right shoulder pain  M25.511     G89.29       2. Chronic left shoulder pain  M25.512     G89.29                      Subjective: Pt reports a resolution of R shoulder pain since last visit, intermittent L shoulder pain with FE. Objective: See treatment diary below    Assessment: Pt continues to be able to abolish pain and normalize ABD AROM and arthrokinematics following c/s retraction, extension. Plan: Cont. POC for c/s and thoracic extension. Precautions:  PMHx: HTN, CKD II  Dx: chronic B subacromial impingement, lower c/s derangement with a retraction, extension DP    Manuals          Gr IV prone T1-8 PA mobs 1x30 ea 1x30 ea 1x30 ea 1x30 ea         Manual c/s retraction clinician OP 3x10 3x10 3x10 3x10         Manual supine c/s distraction, retraction, extension  1x5 1x5 1x5                                                             Neuro Re-Ed             Seated t/s extension 3x10 3x10 3x10 3x10                      C/s retraction OP extension 3x10 3x10 3x10 5x10                                                                                       B TB T's G/  3x15 G/  3x15 G/  3x15 G/  3x20         Prone Y's             Prone t/s extension 3x10 3x10 3x10 3x15                                   Ther Ex                          Doorway pec stretch 10"x3 10"x3 10"x3 10"x3         TB ER 90/90 Blue  L/  3x15  Green  R/  3x15 Blue  L/  3x15  R/  3x10 Blue  B/  3x15 Blue  B/  3x15                                                                                       Ther Activity                                       Gait Training                                       Modalities

## 2023-09-28 ENCOUNTER — OFFICE VISIT (OUTPATIENT)
Dept: PHYSICAL THERAPY | Facility: CLINIC | Age: 78
End: 2023-09-28
Payer: MEDICARE

## 2023-09-28 DIAGNOSIS — M25.512 CHRONIC LEFT SHOULDER PAIN: ICD-10-CM

## 2023-09-28 DIAGNOSIS — M25.511 CHRONIC RIGHT SHOULDER PAIN: Primary | ICD-10-CM

## 2023-09-28 DIAGNOSIS — M25.511 ACUTE PAIN OF RIGHT SHOULDER: ICD-10-CM

## 2023-09-28 DIAGNOSIS — M25.512 ACUTE PAIN OF LEFT SHOULDER: ICD-10-CM

## 2023-09-28 DIAGNOSIS — G89.29 CHRONIC LEFT SHOULDER PAIN: ICD-10-CM

## 2023-09-28 DIAGNOSIS — G89.29 CHRONIC RIGHT SHOULDER PAIN: Primary | ICD-10-CM

## 2023-09-28 PROCEDURE — 97110 THERAPEUTIC EXERCISES: CPT

## 2023-09-28 PROCEDURE — 97112 NEUROMUSCULAR REEDUCATION: CPT

## 2023-09-28 NOTE — PROGRESS NOTES
Daily Note     Today's date: 2023  Patient name: Jonny Morrison  : 1945  MRN: [id number]  Referring provider: Vahe Salinas MD  Dx:   Encounter Diagnosis     ICD-10-CM    1. Chronic right shoulder pain  M25.511     G89.29       2. Chronic left shoulder pain  M25.512     G89.29       3. Acute pain of right shoulder  M25.511       4. Acute pain of left shoulder  M25.512           Start Time: 1000  Stop Time: 1045  Total time in clinic (min): 45 minutes    Subjective: Patient states, "I did a lot of painting yesterday which caused B/L UT soreness and limited AROM following. I was javier to complete my exercises at home which helped the symptoms and AROM". He notes soreness prior to therapy with painful AROM abd. Objective: See treatment diary below    Assessment: Pt continues to be able to abolish pain and normalize ABD AROM and arthrokinematics following c/s retraction, extension. Plan: Cont. POC for c/s and thoracic extension. Precautions:  PMHx: HTN, CKD II  Dx: chronic B subacromial impingement, lower c/s derangement with a retraction, extension DP    Manuals          Gr IV prone T1-8 PA mobs 1x30 ea 1x30 ea 1x30 ea 1x30 ea         Manual c/s retraction clinician OP 3x10 3x10 3x10 3x10         Manual supine c/s distraction, retraction, extension  1x5 1x5 1x5                                                             Neuro Re-Ed             Seated t/s extension 3x10 3x10 3x10 3x10                      C/s retraction OP extension 3x10 3x10 3x10 5x10                                                                                       B TB T's G/  3x15 G/  3x15 G/  3x15 G/  3x20         Prone Y's             Prone t/s extension 3x10 3x10 3x10 3x15                                   Ther Ex                          Doorway pec stretch 10"x3 10"x3 10"x3 10"x3         TB ER 90/90 Blue  L/  3x15  Green  R/  3x15 Blue  L/  3x15  R/  3x10 Blue  B/  3x15 Blue  B/  3x15 Ther Activity                                       Gait Training                                       Modalities                                            Daily Note     Today's date: 2023  Patient name: Jean-Paul Lozano  : 1945  MRN: [id number]  Referring provider: Tresea Essex, MD  Dx:   Encounter Diagnosis     ICD-10-CM    1. Chronic right shoulder pain  M25.511     G89.29       2. Chronic left shoulder pain  M25.512     G89.29       3. Acute pain of right shoulder  M25.511       4. Acute pain of left shoulder  M25.512           Start Time: 1000  Stop Time: 1045  Total time in clinic (min): 45 minutes    Subjective: Pt reports a resolution of R shoulder pain since last visit, intermittent L shoulder pain with FE. Objective: See treatment diary below    Assessment: Pt continues to be able to abolish pain and normalize ABD AROM and arthrokinematics following c/s retraction, extension. Plan: Cont. POC for c/s and thoracic extension. Precautions:  PMHx: HTN, CKD II  Dx: chronic B subacromial impingement, lower c/s derangement with a retraction, extension DP    Manuals         Gr IV prone T1-8 PA mobs 1x30 ea 1x30 ea 1x30 ea 1x30 ea         Manual c/s retraction clinician OP 3x10 3x10 3x10 3x10         Manual supine c/s distraction, retraction, extension  1x5 1x5 1x5                                                             Neuro Re-Ed             Seated t/s extension 3x10 3x10 3x10 3x10 3x10                     C/s retraction OP extension 3x10 3x10 3x10 5x10 5x10                                                                                      B TB T's G/  3x15 G/  3x15 G/  3x15 G/  3x20 G/  3x20        Prone Y's             Prone t/s extension 3x10 3x10 3x10 3x15 3x15                                  Ther Ex                          Doorway pec stretch 10"x3 10"x3 10"x3 10"x3 10"x3        TB ER 90/90 Blue  L/  3x15  Green  R/  3x15 Blue  L/  3x15  R/  3x10 Blue  B/  3x15 Blue  B/  3x15 Blue  B/  3x15                                                                                      Ther Activity                                       Gait Training                                       Modalities

## 2023-10-03 ENCOUNTER — OFFICE VISIT (OUTPATIENT)
Dept: PHYSICAL THERAPY | Facility: CLINIC | Age: 78
End: 2023-10-03
Payer: MEDICARE

## 2023-10-03 DIAGNOSIS — M25.511 ACUTE PAIN OF RIGHT SHOULDER: ICD-10-CM

## 2023-10-03 DIAGNOSIS — G89.29 CHRONIC RIGHT SHOULDER PAIN: Primary | ICD-10-CM

## 2023-10-03 DIAGNOSIS — G89.29 CHRONIC LEFT SHOULDER PAIN: ICD-10-CM

## 2023-10-03 DIAGNOSIS — M25.512 CHRONIC LEFT SHOULDER PAIN: ICD-10-CM

## 2023-10-03 DIAGNOSIS — M25.511 CHRONIC RIGHT SHOULDER PAIN: Primary | ICD-10-CM

## 2023-10-03 PROCEDURE — 97112 NEUROMUSCULAR REEDUCATION: CPT

## 2023-10-03 PROCEDURE — 97110 THERAPEUTIC EXERCISES: CPT

## 2023-10-03 NOTE — PROGRESS NOTES
Daily Note     Today's date: 10/3/2023  Patient name: Sweetie Angel  : 1945  MRN: [id number]  Referring provider: Kirk Glynn MD  Dx:   Encounter Diagnosis     ICD-10-CM    1. Chronic right shoulder pain  M25.511     G89.29       2. Chronic left shoulder pain  M25.512     G89.29       3. Acute pain of right shoulder  M25.511                      Subjective: Patient reports he is feeling good today. Reports he can relieve his symptoms with his HEP. Objective: See treatment diary below    Assessment: Patient demonstrates good tolerance to exercise routine. Continues to be limited with thoracic extension but able to work within his mobility ranges. Plan: Cont. POC for c/s and thoracic extension. Precautions:  PMHx: HTN, CKD II  Dx: chronic B subacromial impingement, lower c/s derangement with a retraction, extension DP    Manuals  10/3        Gr IV prone T1-8 PA mobs 1x30 ea 1x30 ea 1x30 ea 1x30 ea 1x30 ea        Manual c/s retraction clinician OP 3x10 3x10 3x10 3x10 3x10        Manual supine c/s distraction, retraction, extension  1x5 1x5 1x5 1x10                                                            Neuro Re-Ed             Seated t/s extension 3x10 3x10 3x10 3x10 3x10                     C/s retraction OP extension 3x10 3x10 3x10 5x10 5x10                                                                                      B TB T's G/  3x15 G/  3x15 G/  3x15 G/  3x20 G/  3x20        Prone Y's             Prone t/s extension 3x10 3x10 3x10 3x15 3x15                                  Ther Ex                          Doorway pec stretch 10"x3 10"x3 10"x3 10"x3 10" x3        TB ER 90/90 Blue  L/  3x15  Green  R/  3x15 Blue  L/  3x15  R/  3x10 Blue  B/  3x15 Blue  B/  3x15 Blue  B/3x15                                                                                      Ther Activity                                       Gait Training Modalities

## 2023-10-05 ENCOUNTER — HOSPITAL ENCOUNTER (OUTPATIENT)
Dept: CT IMAGING | Facility: HOSPITAL | Age: 78
Discharge: HOME | End: 2023-10-05
Payer: MEDICARE

## 2023-10-05 ENCOUNTER — APPOINTMENT (OUTPATIENT)
Dept: PHYSICAL THERAPY | Facility: CLINIC | Age: 78
End: 2023-10-05
Payer: MEDICARE

## 2023-10-05 DIAGNOSIS — G51.31 CLONIC HEMIFACIAL SPASM, RIGHT: ICD-10-CM

## 2023-10-05 PROCEDURE — 70481 CT ORBIT/EAR/FOSSA W/DYE: CPT

## 2023-10-05 PROCEDURE — G1004 CDSM NDSC: HCPCS

## 2023-10-05 PROCEDURE — 70470 CT HEAD/BRAIN W/O & W/DYE: CPT

## 2023-10-05 RX ADMIN — IOHEXOL 85 ML: 350 INJECTION, SOLUTION INTRAVENOUS at 15:13

## 2023-10-10 ENCOUNTER — OFFICE VISIT (OUTPATIENT)
Dept: PHYSICAL THERAPY | Facility: CLINIC | Age: 78
End: 2023-10-10
Payer: MEDICARE

## 2023-10-10 DIAGNOSIS — G89.29 CHRONIC RIGHT SHOULDER PAIN: Primary | ICD-10-CM

## 2023-10-10 DIAGNOSIS — G89.29 CHRONIC LEFT SHOULDER PAIN: ICD-10-CM

## 2023-10-10 DIAGNOSIS — M25.511 CHRONIC RIGHT SHOULDER PAIN: Primary | ICD-10-CM

## 2023-10-10 DIAGNOSIS — M25.511 ACUTE PAIN OF RIGHT SHOULDER: ICD-10-CM

## 2023-10-10 DIAGNOSIS — M25.512 CHRONIC LEFT SHOULDER PAIN: ICD-10-CM

## 2023-10-10 DIAGNOSIS — M25.512 ACUTE PAIN OF LEFT SHOULDER: ICD-10-CM

## 2023-10-10 PROCEDURE — 97110 THERAPEUTIC EXERCISES: CPT | Performed by: PHYSICAL THERAPIST

## 2023-10-10 PROCEDURE — 97140 MANUAL THERAPY 1/> REGIONS: CPT | Performed by: PHYSICAL THERAPIST

## 2023-10-10 PROCEDURE — 97112 NEUROMUSCULAR REEDUCATION: CPT | Performed by: PHYSICAL THERAPIST

## 2023-10-10 NOTE — PROGRESS NOTES
Daily Note     Today's date: 10/10/2023  Patient name: Andres Mcclellan  : 1945  MRN: [id number]  Referring provider: Manoj Vaughan MD  Dx:   No diagnosis found. Subjective: Patient reports he is feeling better but still notes pain with OH lifting and reaching which he does frequently. Objective: See treatment diary below    Assessment: Patient demonstrated good knowledge of current program. Increased resistance on T's as patient reports using the blue theraband at home. Patient fatigued with shoulder strengthening. The patient was very limited throughout thoracic vertebrae during manuals mildly improved with PA mobs. Plan: Cont. POC for c/s and thoracic extension. Precautions:  PMHx: HTN, CKD II  Dx: chronic B subacromial impingement, lower c/s derangement with a retraction, extension DP    Manuals 9/12 9/19 9/21 9/26 10/3 10/10       Gr IV prone T1-8 PA mobs 1x30 ea 1x30 ea 1x30 ea 1x30 ea 1x30 ea 1x30 ea       Manual c/s retraction clinician OP 3x10 3x10 3x10 3x10 3x10 3x10       Manual supine c/s distraction, retraction, extension  1x5 1x5 1x5 1x10 1x10                                                           Neuro Re-Ed             Seated t/s extension 3x10 3x10 3x10 3x10 3x10 3x10                    C/s retraction OP extension 3x10 3x10 3x10 5x10 5x10 5x10                                                                                     B TB T's G/  3x15 G/  3x15 G/  3x15 G/  3x20 G/  3x20 Blue 3x15       Prone Y's             Prone t/s extension 3x10 3x10 3x10 3x15 3x15 3x15                                 Ther Ex                          Doorway pec stretch 10"x3 10"x3 10"x3 10"x3 10" x3 10"x3       TB ER 90/90 Blue  L/  3x15  Green  R/  3x15 Blue  L/  3x15  R/  3x10 Blue  B/  3x15 Blue  B/  3x15 Blue  B/3x15 Blue B/3x15                                                                                     Ther Activity                                       Gait Training                                       Modalities

## 2023-10-12 ENCOUNTER — OFFICE VISIT (OUTPATIENT)
Dept: PHYSICAL THERAPY | Facility: CLINIC | Age: 78
End: 2023-10-12
Payer: MEDICARE

## 2023-10-12 DIAGNOSIS — M25.511 CHRONIC RIGHT SHOULDER PAIN: Primary | ICD-10-CM

## 2023-10-12 DIAGNOSIS — G89.29 CHRONIC RIGHT SHOULDER PAIN: Primary | ICD-10-CM

## 2023-10-12 DIAGNOSIS — G89.29 CHRONIC LEFT SHOULDER PAIN: ICD-10-CM

## 2023-10-12 DIAGNOSIS — M25.512 CHRONIC LEFT SHOULDER PAIN: ICD-10-CM

## 2023-10-12 PROCEDURE — 97140 MANUAL THERAPY 1/> REGIONS: CPT | Performed by: PHYSICAL THERAPIST

## 2023-10-12 PROCEDURE — 97110 THERAPEUTIC EXERCISES: CPT | Performed by: PHYSICAL THERAPIST

## 2023-10-12 PROCEDURE — 97112 NEUROMUSCULAR REEDUCATION: CPT | Performed by: PHYSICAL THERAPIST

## 2023-10-12 NOTE — PROGRESS NOTES
Daily Note     Today's date: 10/12/2023  Patient name: Demond Cheema  : 1945  MRN: [id number]  Referring provider: Jessica Mcclure MD  Dx:   Encounter Diagnosis     ICD-10-CM    1. Chronic right shoulder pain  M25.511     G89.29       2. Chronic left shoulder pain  M25.512     G89.29                      Subjective: Pt reports no pain with OH reaching and improved pain with OH lifting, especially when more attentive to his posture. Objective: See treatment diary below    Assessment: Pt demonstrated c/s and t/s hypomobility and a resolution of painful arc. Plan: Cont. POC. Precautions:  PMHx: HTN, CKD II  Dx: chronic B subacromial impingement, lower c/s derangement with a retraction, extension DP    Manuals 9/12 9/19 9/21 9/26 10/3 10/10 10/12      Gr IV prone T1-8 PA mobs 1x30 ea 1x30 ea 1x30 ea 1x30 ea 1x30 ea 1x30 ea 1x30 ea      Manual c/s retraction clinician OP 3x10 3x10 3x10 3x10 3x10 3x10 3x10      Manual supine c/s distraction, retraction, extension  1x5 1x5 1x5 1x10 1x10 1x10                                                          Neuro Re-Ed             Seated t/s extension 3x10 3x10 3x10 3x10 3x10 3x10 3x10                   C/s retraction OP extension 3x10 3x10 3x10 5x10 5x10 5x10 5x10                                                                                    B TB T's G/  3x15 G/  3x15 G/  3x15 G/  3x20 G/  3x20 Blue 3x15 Blue  3x15      Prone Y's             Prone t/s extension 3x10 3x10 3x10 3x15 3x15 3x15 3x15 3x20                               Ther Ex                          Doorway pec stretch 10"x3 10"x3 10"x3 10"x3 10" x3 10"x3 10"x3      TB ER 90/90 Blue  L/  3x15  Green  R/  3x15 Blue  L/  3x15  R/  3x10 Blue  B/  3x15 Blue  B/  3x15 Blue  B/3x15 Blue B/3x15 Blue  B/  3x15                                                                                    Ther Activity                                       Gait Training Modalities

## 2023-10-13 ENCOUNTER — LAB (OUTPATIENT)
Dept: LAB | Facility: CLINIC | Age: 78
End: 2023-10-13
Payer: MEDICARE

## 2023-10-13 DIAGNOSIS — E78.2 MIXED HYPERLIPIDEMIA: ICD-10-CM

## 2023-10-13 DIAGNOSIS — R73.03 PREDIABETES: ICD-10-CM

## 2023-10-13 DIAGNOSIS — E53.8 VITAMIN B12 DEFICIENCY: ICD-10-CM

## 2023-10-13 DIAGNOSIS — I10 ESSENTIAL HYPERTENSION: ICD-10-CM

## 2023-10-13 LAB
ALBUMIN SERPL BCP-MCNC: 4.5 G/DL (ref 3.5–5)
ALP SERPL-CCNC: 56 U/L (ref 34–104)
ALT SERPL W P-5'-P-CCNC: 21 U/L (ref 7–52)
ANION GAP SERPL CALCULATED.3IONS-SCNC: 6 MMOL/L
AST SERPL W P-5'-P-CCNC: 22 U/L (ref 13–39)
BASOPHILS # BLD AUTO: 0.03 THOUSANDS/ÂΜL (ref 0–0.1)
BASOPHILS NFR BLD AUTO: 1 % (ref 0–1)
BILIRUB SERPL-MCNC: 0.74 MG/DL (ref 0.2–1)
BUN SERPL-MCNC: 14 MG/DL (ref 5–25)
CALCIUM SERPL-MCNC: 9.4 MG/DL (ref 8.4–10.2)
CHLORIDE SERPL-SCNC: 101 MMOL/L (ref 96–108)
CHOLEST SERPL-MCNC: 179 MG/DL
CO2 SERPL-SCNC: 33 MMOL/L (ref 21–32)
CREAT SERPL-MCNC: 1.01 MG/DL (ref 0.6–1.3)
EOSINOPHIL # BLD AUTO: 0.31 THOUSAND/ÂΜL (ref 0–0.61)
EOSINOPHIL NFR BLD AUTO: 5 % (ref 0–6)
ERYTHROCYTE [DISTWIDTH] IN BLOOD BY AUTOMATED COUNT: 13.1 % (ref 11.6–15.1)
EST. AVERAGE GLUCOSE BLD GHB EST-MCNC: 137 MG/DL
GFR SERPL CREATININE-BSD FRML MDRD: 70 ML/MIN/1.73SQ M
GLUCOSE P FAST SERPL-MCNC: 99 MG/DL (ref 65–99)
HBA1C MFR BLD: 6.4 %
HCT VFR BLD AUTO: 45.5 % (ref 36.5–49.3)
HDLC SERPL-MCNC: 42 MG/DL
HGB BLD-MCNC: 14.4 G/DL (ref 12–17)
IMM GRANULOCYTES # BLD AUTO: 0.03 THOUSAND/UL (ref 0–0.2)
IMM GRANULOCYTES NFR BLD AUTO: 1 % (ref 0–2)
LDLC SERPL CALC-MCNC: 101 MG/DL (ref 0–100)
LYMPHOCYTES # BLD AUTO: 1.75 THOUSANDS/ÂΜL (ref 0.6–4.47)
LYMPHOCYTES NFR BLD AUTO: 27 % (ref 14–44)
MCH RBC QN AUTO: 30.6 PG (ref 26.8–34.3)
MCHC RBC AUTO-ENTMCNC: 31.6 G/DL (ref 31.4–37.4)
MCV RBC AUTO: 97 FL (ref 82–98)
MONOCYTES # BLD AUTO: 0.42 THOUSAND/ÂΜL (ref 0.17–1.22)
MONOCYTES NFR BLD AUTO: 6 % (ref 4–12)
NEUTROPHILS # BLD AUTO: 4.06 THOUSANDS/ÂΜL (ref 1.85–7.62)
NEUTS SEG NFR BLD AUTO: 60 % (ref 43–75)
NONHDLC SERPL-MCNC: 137 MG/DL
NRBC BLD AUTO-RTO: 0 /100 WBCS
PLATELET # BLD AUTO: 253 THOUSANDS/UL (ref 149–390)
PMV BLD AUTO: 10.4 FL (ref 8.9–12.7)
POTASSIUM SERPL-SCNC: 4.6 MMOL/L (ref 3.5–5.3)
PROT SERPL-MCNC: 7.1 G/DL (ref 6.4–8.4)
RBC # BLD AUTO: 4.71 MILLION/UL (ref 3.88–5.62)
SODIUM SERPL-SCNC: 140 MMOL/L (ref 135–147)
TRIGL SERPL-MCNC: 181 MG/DL
TSH SERPL DL<=0.05 MIU/L-ACNC: 3.32 UIU/ML (ref 0.45–4.5)
VIT B12 SERPL-MCNC: 461 PG/ML (ref 180–914)
WBC # BLD AUTO: 6.6 THOUSAND/UL (ref 4.31–10.16)

## 2023-10-13 PROCEDURE — 82607 VITAMIN B-12: CPT

## 2023-10-13 PROCEDURE — 36415 COLL VENOUS BLD VENIPUNCTURE: CPT

## 2023-10-13 PROCEDURE — 84443 ASSAY THYROID STIM HORMONE: CPT

## 2023-10-13 PROCEDURE — 83036 HEMOGLOBIN GLYCOSYLATED A1C: CPT

## 2023-10-13 PROCEDURE — 85025 COMPLETE CBC W/AUTO DIFF WBC: CPT

## 2023-10-13 PROCEDURE — 80053 COMPREHEN METABOLIC PANEL: CPT

## 2023-10-13 PROCEDURE — 80061 LIPID PANEL: CPT

## 2023-10-17 ENCOUNTER — OFFICE VISIT (OUTPATIENT)
Dept: PHYSICAL THERAPY | Facility: CLINIC | Age: 78
End: 2023-10-17
Payer: MEDICARE

## 2023-10-17 DIAGNOSIS — M25.512 CHRONIC LEFT SHOULDER PAIN: ICD-10-CM

## 2023-10-17 DIAGNOSIS — G89.29 CHRONIC RIGHT SHOULDER PAIN: Primary | ICD-10-CM

## 2023-10-17 DIAGNOSIS — G89.29 CHRONIC LEFT SHOULDER PAIN: ICD-10-CM

## 2023-10-17 DIAGNOSIS — M25.511 CHRONIC RIGHT SHOULDER PAIN: Primary | ICD-10-CM

## 2023-10-17 PROCEDURE — 97110 THERAPEUTIC EXERCISES: CPT | Performed by: PHYSICAL THERAPIST

## 2023-10-17 PROCEDURE — 97140 MANUAL THERAPY 1/> REGIONS: CPT | Performed by: PHYSICAL THERAPIST

## 2023-10-17 PROCEDURE — 97112 NEUROMUSCULAR REEDUCATION: CPT | Performed by: PHYSICAL THERAPIST

## 2023-10-17 NOTE — PROGRESS NOTES
Daily Note     Today's date: 10/17/2023  Patient name: Harvinder Sibley  : 1945  MRN: [id number]  Referring provider: Reva Mosqueda MD  Dx:   Encounter Diagnosis     ICD-10-CM    1. Chronic right shoulder pain  M25.511     G89.29       2. Chronic left shoulder pain  M25.512     G89.29                      Subjective: Pt reports mild L>R superior and posterior shoulder pain after forceful B shoulder rounding while cleaning his pond over the weekend. Objective: See treatment diary below    Assessment: Pt demonstrated improved pain, ROM, and subjective stiffness following L pec minor release. Plan: Cont. POC. Precautions:  PMHx: HTN, CKD II  Dx: chronic B subacromial impingement, lower c/s derangement with a retraction, extension DP    Manuals 9/12 9/19 9/21 9/26 10/3 10/10 10/12 10/17     Gr IV prone T1-8 PA mobs 1x30 ea 1x30 ea 1x30 ea 1x30 ea 1x30 ea 1x30 ea 1x30 ea 1x30 ea     Manual c/s retraction clinician OP 3x10 3x10 3x10 3x10 3x10 3x10 3x10 3x10     Manual supine c/s distraction, retraction, extension  1x5 1x5 1x5 1x10 1x10 1x10      L pec minor release        3 min                                            Neuro Re-Ed             Seated t/s extension 3x10 3x10 3x10 3x10 3x10 3x10 3x10 3x10                  C/s retraction OP extension 3x10 3x10 3x10 5x10 5x10 5x10 5x10 5x10                                                                                   B TB T's G/  3x15 G/  3x15 G/  3x15 G/  3x20 G/  3x20 Blue 3x15 Blue  3x15 Blue  3x15 Blue  3x20    Prone Y's             Prone t/s extension 3x10 3x10 3x10 3x15 3x15 3x15 3x15 3x20                               Ther Ex                          Doorway pec stretch 10"x3 10"x3 10"x3 10"x3 10" x3 10"x3 10"x3 10"x3  10"x1     TB ER 90/90 Blue  L/  3x15  Green  R/  3x15 Blue  L/  3x15  R/  3x10 Blue  B/  3x15 Blue  B/  3x15 Blue  B/3x15 Blue B/3x15 Blue  B/  3x15 Blue  B/  3x15 Ther Activity                                       Gait Training                                       Modalities

## 2023-10-19 ENCOUNTER — OFFICE VISIT (OUTPATIENT)
Dept: PHYSICAL THERAPY | Facility: CLINIC | Age: 78
End: 2023-10-19
Payer: MEDICARE

## 2023-10-19 DIAGNOSIS — G89.29 CHRONIC RIGHT SHOULDER PAIN: Primary | ICD-10-CM

## 2023-10-19 DIAGNOSIS — G89.29 CHRONIC LEFT SHOULDER PAIN: ICD-10-CM

## 2023-10-19 DIAGNOSIS — M25.512 CHRONIC LEFT SHOULDER PAIN: ICD-10-CM

## 2023-10-19 DIAGNOSIS — M25.511 CHRONIC RIGHT SHOULDER PAIN: Primary | ICD-10-CM

## 2023-10-19 PROCEDURE — 97112 NEUROMUSCULAR REEDUCATION: CPT | Performed by: PHYSICAL THERAPIST

## 2023-10-19 PROCEDURE — 97110 THERAPEUTIC EXERCISES: CPT | Performed by: PHYSICAL THERAPIST

## 2023-10-19 NOTE — PROGRESS NOTES
Daily Note     Today's date: 10/19/2023  Patient name: Neri Justice  : 1945  MRN: [id number]  Referring provider: Rell Persaud MD  Dx:   Encounter Diagnosis     ICD-10-CM    1. Chronic right shoulder pain  M25.511     G89.29       2. Chronic left shoulder pain  M25.512     G89.29                      Subjective: Pt reports no pain since last visit. Objective: See treatment diary below    Assessment: Pt demonstrated normalized B shoulder ROM and arthrokinematics prior to any MT. Plan: Cont. POC. Precautions:  PMHx: HTN, CKD II  Dx: chronic B subacromial impingement, lower c/s derangement with a retraction, extension DP    Manuals 9/12 9/19 9/21 9/26 10/3 10/10 10/12 10/17 10/19    Gr IV prone T1-8 PA mobs 1x30 ea 1x30 ea 1x30 ea 1x30 ea 1x30 ea 1x30 ea 1x30 ea 1x30 ea 1x40 ea    Manual c/s retraction clinician OP 3x10 3x10 3x10 3x10 3x10 3x10 3x10 3x10 3x10    Manual supine c/s distraction, retraction, extension  1x5 1x5 1x5 1x10 1x10 1x10      L pec minor release        3 min np                                           Neuro Re-Ed             Seated t/s extension 3x10 3x10 3x10 3x10 3x10 3x10 3x10 3x10 3x10                 C/s retraction OP extension 3x10 3x10 3x10 5x10 5x10 5x10 5x10 5x10 5x10                                                                                  B TB T's G/  3x15 G/  3x15 G/  3x15 G/  3x20 G/  3x20 Blue 3x15 Blue  3x15 Blue  3x15 Blue  3x20    Prone Y's             Prone t/s extension 3x10 3x10 3x10 3x15 3x15 3x15 3x15 3x20 3x20                              Ther Ex                          Doorway pec stretch 10"x3 10"x3 10"x3 10"x3 10" x3 10"x3 10"x3 10"x3  10"x1 10"x3    TB ER 90/90 Blue  L/  3x15  Green  R/  3x15 Blue  L/  3x15  R/  3x10 Blue  B/  3x15 Blue  B/  3x15 Blue  B/3x15 Blue B/3x15 Blue  B/  3x15 Blue  B/  3x15 Blue  B/  3x15                                                                                  Ther Activity Gait Training                                       Modalities

## 2023-10-20 ENCOUNTER — OFFICE VISIT (OUTPATIENT)
Dept: INTERNAL MEDICINE CLINIC | Facility: CLINIC | Age: 78
End: 2023-10-20
Payer: MEDICARE

## 2023-10-20 VITALS
HEIGHT: 66 IN | BODY MASS INDEX: 36.61 KG/M2 | DIASTOLIC BLOOD PRESSURE: 80 MMHG | OXYGEN SATURATION: 95 % | SYSTOLIC BLOOD PRESSURE: 126 MMHG | TEMPERATURE: 96.4 F | WEIGHT: 227.8 LBS | HEART RATE: 58 BPM

## 2023-10-20 DIAGNOSIS — N18.2 CHRONIC KIDNEY DISEASE (CKD), STAGE II (MILD): ICD-10-CM

## 2023-10-20 DIAGNOSIS — R73.03 PREDIABETES: ICD-10-CM

## 2023-10-20 DIAGNOSIS — Z23 NEED FOR INFLUENZA VACCINATION: ICD-10-CM

## 2023-10-20 DIAGNOSIS — I10 ESSENTIAL HYPERTENSION: ICD-10-CM

## 2023-10-20 DIAGNOSIS — E66.01 CLASS 2 SEVERE OBESITY DUE TO EXCESS CALORIES WITH SERIOUS COMORBIDITY AND BODY MASS INDEX (BMI) OF 37.0 TO 37.9 IN ADULT: ICD-10-CM

## 2023-10-20 DIAGNOSIS — K21.9 LARYNGOPHARYNGEAL REFLUX (LPR): ICD-10-CM

## 2023-10-20 DIAGNOSIS — E53.8 VITAMIN B12 DEFICIENCY: ICD-10-CM

## 2023-10-20 DIAGNOSIS — E78.2 MIXED HYPERLIPIDEMIA: ICD-10-CM

## 2023-10-20 DIAGNOSIS — G24.5 EYE TWITCH: ICD-10-CM

## 2023-10-20 DIAGNOSIS — M79.675 PAIN IN LEFT TOE(S): Primary | ICD-10-CM

## 2023-10-20 DIAGNOSIS — M25.512 ACUTE PAIN OF LEFT SHOULDER: ICD-10-CM

## 2023-10-20 DIAGNOSIS — Z71.9 HEALTH COUNSELING: ICD-10-CM

## 2023-10-20 DIAGNOSIS — Z23 ENCOUNTER FOR IMMUNIZATION: ICD-10-CM

## 2023-10-20 PROCEDURE — 99214 OFFICE O/P EST MOD 30 MIN: CPT | Performed by: INTERNAL MEDICINE

## 2023-10-20 PROCEDURE — 90662 IIV NO PRSV INCREASED AG IM: CPT

## 2023-10-20 PROCEDURE — G0008 ADMIN INFLUENZA VIRUS VAC: HCPCS

## 2023-10-20 NOTE — ASSESSMENT & PLAN NOTE
Lab Results   Component Value Date    EGFR 70 10/13/2023    EGFR 65 09/26/2023    EGFR 72 04/14/2023    CREATININE 1.01 10/13/2023    CREATININE 1.08 09/26/2023    CREATININE 0.99 04/14/2023     Stable.

## 2023-10-20 NOTE — PROGRESS NOTES
Assessment/Plan:    Acute pain of left shoulder  Improving, ongoing physical therapy. Continue daily stretching exercises. Laryngopharyngeal reflux (LPR)  Taking omeprazole bid, symptoms improved. Essential hypertension  BP stable, on diuretics. Chronic kidney disease (CKD), stage II (mild)  Lab Results   Component Value Date    EGFR 70 10/13/2023    EGFR 65 09/26/2023    EGFR 72 04/14/2023    CREATININE 1.01 10/13/2023    CREATININE 1.08 09/26/2023    CREATININE 0.99 04/14/2023     Stable. Class 2 severe obesity due to excess calories with serious comorbidity and body mass index (BMI) of 37.0 to 37.9 in adult ([facility])  Weight stable. Encouraged to walk daily. Vitamin B12 deficiency  Taking B12. Prediabetes  A1c worse at 6.5%. Discussed starting metformin. Limit portions. Diagnoses and all orders for this visit:    Pain in left toe(s)  Comments:  Refer to podiatry. Eye twitch  Comments:  s/p Botox injection. Follow up with Ophtha. Acute pain of left shoulder    Class 2 severe obesity due to excess calories with serious comorbidity and body mass index (BMI) of 37.0 to 37.9 in adult     Essential hypertension    Chronic kidney disease (CKD), stage II (mild)  -     Comprehensive metabolic panel; Future    Prediabetes  -     Hemoglobin A1C; Future    Vitamin B12 deficiency    Need for influenza vaccination    Mixed hyperlipidemia  -     Lipid panel; Future    Laryngopharyngeal reflux (LPR)    Encounter for immunization  -     influenza vaccine, high-dose, PF 0.7 mL (FLUZONE HIGH-DOSE)    Health counseling  Comments:  Flu vaccine today. Follow up in 5 months or as needed. Subjective:      Patient ID: Jeane Martínez is a 66 y.o. male here for a follow up. He has been going to physical therapy for his shoulder for the past few months. He feels it is getting better but he remains very active and busy and would feel it tightened up frequently.   He does stretching exercises at home, working on his posture. He complains of pain in his left pinky toe for almost a year. Pain getting worse, having more difficulty with walking. He has tried multiple over the counter things which has not helped, also tried changing foot wear. He had a Botox injection around his right eye 2 days ago. He was having frequent twitching around the eye, worse with stress. He reports mild swelling afterwards but feels it is better today. He does not eat a lot of carbs but has large portions of food usually. The following portions of the patient's history were reviewed and updated as appropriate: allergies, current medications, past medical history, past social history, and problem list.    Review of Systems   Constitutional:  Negative for appetite change and fatigue. HENT:  Negative for congestion, hearing loss and postnasal drip. Eyes: Negative. Respiratory:  Negative for cough, chest tightness and shortness of breath. Cardiovascular:  Negative for chest pain, palpitations and leg swelling. Gastrointestinal:  Negative for abdominal pain and constipation. Genitourinary:  Negative for dysuria, frequency and urgency. Musculoskeletal:  Positive for arthralgias and gait problem. Skin:  Negative for rash and wound. Neurological:  Negative for dizziness, numbness and headaches. Hematological:  Negative for adenopathy. Does not bruise/bleed easily. Psychiatric/Behavioral:  Negative for sleep disturbance. The patient is not nervous/anxious. Objective:      /80   Pulse 58   Temp (!) 96.4 °F (35.8 °C)   Ht 5' 6" (1.676 m)   Wt 103 kg (227 lb 12.8 oz)   SpO2 95%   BMI 36.77 kg/m²          Physical Exam  Vitals and nursing note reviewed. Constitutional:       Appearance: He is well-developed. HENT:      Head: Normocephalic and atraumatic. Eyes:      Conjunctiva/sclera: Conjunctivae normal.      Pupils: Pupils are equal, round, and reactive to light. Cardiovascular:      Rate and Rhythm: Normal rate and regular rhythm. Heart sounds: Normal heart sounds. Pulmonary:      Effort: Pulmonary effort is normal.      Breath sounds: No wheezing or rhonchi. Abdominal:      General: Bowel sounds are normal.      Palpations: Abdomen is soft. Musculoskeletal:         General: No swelling. Cervical back: Neck supple. Spasms present. No tenderness. Thoracic back: Spasms present. No tenderness. Right lower leg: No edema. Left lower leg: No edema. Feet:    Feet:      Left foot:      Skin integrity: Callus present. No ulcer, erythema or warmth. Skin:     General: Skin is warm. Findings: No rash. Neurological:      General: No focal deficit present. Mental Status: He is alert and oriented to person, place, and time. Psychiatric:         Mood and Affect: Mood and affect normal.         Behavior: Behavior normal.           Labs & imaging results reviewed with patient.

## 2023-10-24 ENCOUNTER — OFFICE VISIT (OUTPATIENT)
Dept: PHYSICAL THERAPY | Facility: CLINIC | Age: 78
End: 2023-10-24
Payer: MEDICARE

## 2023-10-24 DIAGNOSIS — G89.29 CHRONIC LEFT SHOULDER PAIN: ICD-10-CM

## 2023-10-24 DIAGNOSIS — G89.29 CHRONIC RIGHT SHOULDER PAIN: Primary | ICD-10-CM

## 2023-10-24 DIAGNOSIS — M25.511 CHRONIC RIGHT SHOULDER PAIN: Primary | ICD-10-CM

## 2023-10-24 DIAGNOSIS — M25.511 ACUTE PAIN OF RIGHT SHOULDER: ICD-10-CM

## 2023-10-24 DIAGNOSIS — M25.512 ACUTE PAIN OF LEFT SHOULDER: ICD-10-CM

## 2023-10-24 DIAGNOSIS — M25.512 CHRONIC LEFT SHOULDER PAIN: ICD-10-CM

## 2023-10-24 PROCEDURE — 97112 NEUROMUSCULAR REEDUCATION: CPT

## 2023-10-24 PROCEDURE — 97110 THERAPEUTIC EXERCISES: CPT

## 2023-10-24 NOTE — PROGRESS NOTES
Daily Note     Today's date: 10/24/2023  Patient name: Halie Rey  : 1945  MRN: [id number]  Referring provider: Gaby Moore MD  Dx:   Encounter Diagnosis     ICD-10-CM    1. Chronic right shoulder pain  M25.511     G89.29       2. Chronic left shoulder pain  M25.512     G89.29       3. Acute pain of right shoulder  M25.511       4. Acute pain of left shoulder  M25.512           Start Time: 1030  Stop Time: 1100  Total time in clinic (min): 30 minutes    Subjective: Patient had no pain since last visit. He notes soreness prior to therapy even with working the entire weekend. Objective: See treatment diary below    Assessment: Patient able to eliminate soreness following exercises listed below and did not need manual therapy. Plan: Cont. POC. Precautions:  PMHx: HTN, CKD II  Dx: chronic B subacromial impingement, lower c/s derangement with a retraction, extension DP    Manuals 9/12 9/19 9/21 9/26 10/3 10/10 10/12 10/17 10/19 10/24   Gr IV prone T1-8 PA mobs 1x30 ea 1x30 ea 1x30 ea 1x30 ea 1x30 ea 1x30 ea 1x30 ea 1x30 ea 1x40 ea    Manual c/s retraction clinician OP 3x10 3x10 3x10 3x10 3x10 3x10 3x10 3x10 3x10    Manual supine c/s distraction, retraction, extension  1x5 1x5 1x5 1x10 1x10 1x10      L pec minor release        3 min np                                           Neuro Re-Ed             Seated t/s extension 3x10 3x10 3x10 3x10 3x10 3x10 3x10 3x10 3x10 3x10                C/s retraction OP extension 3x10 3x10 3x10 5x10 5x10 5x10 5x10 5x10 5x10 5x10                                                                                 B TB T's G/  3x15 G/  3x15 G/  3x15 G/  3x20 G/  3x20 Blue 3x15 Blue  3x15 Blue  3x15 Blue  3x20 Blue  3x20   Prone Y's             Prone t/s extension 3x10 3x10 3x10 3x15 3x15 3x15 3x15 3x20 3x20 3x20                             Ther Ex                          Doorway pec stretch 10"x3 10"x3 10"x3 10"x3 10" x3 10"x3 10"x3 10"x3  10"x1 10"x3 10"x3 TB ER 90/90 Blue  L/  3x15  Green  R/  3x15 Blue  L/  3x15  R/  3x10 Blue  B/  3x15 Blue  B/  3x15 Blue  B/3x15 Blue B/3x15 Blue  B/  3x15 Blue  B/  3x15 Blue  B/  3x15 [facility]  B/  3x15                                                                                 Ther Activity                                       Gait Training                                       Modalities

## 2023-10-26 ENCOUNTER — EVALUATION (OUTPATIENT)
Dept: PHYSICAL THERAPY | Facility: CLINIC | Age: 78
End: 2023-10-26
Payer: MEDICARE

## 2023-10-26 DIAGNOSIS — G89.29 CHRONIC RIGHT SHOULDER PAIN: Primary | ICD-10-CM

## 2023-10-26 DIAGNOSIS — G89.29 CHRONIC LEFT SHOULDER PAIN: ICD-10-CM

## 2023-10-26 DIAGNOSIS — M25.511 CHRONIC RIGHT SHOULDER PAIN: Primary | ICD-10-CM

## 2023-10-26 DIAGNOSIS — M25.512 CHRONIC LEFT SHOULDER PAIN: ICD-10-CM

## 2023-10-26 PROCEDURE — 97112 NEUROMUSCULAR REEDUCATION: CPT | Performed by: PHYSICAL THERAPIST

## 2023-10-26 PROCEDURE — 97110 THERAPEUTIC EXERCISES: CPT | Performed by: PHYSICAL THERAPIST

## 2023-10-26 NOTE — PROGRESS NOTES
PT Re-Evaluation     Today's date: 10/26/2023  Patient name: Jessica Can  : 1945  MRN: [id number]  Referring provider: Joanne Newby MD  Dx:   Encounter Diagnosis     ICD-10-CM    1. Chronic right shoulder pain  M25.511     G89.29       2. Chronic left shoulder pain  M25.512     G89.29                      Assessment  Assessment details: Pt demonstrates normalized pain-free B shoulder active elevation and improved pain with function and strength testing following interventions for cervicothoracic ROM and joint mobility and postural correction. He will continue to benefit from skilled PT services to address his remaining impairments of postural awareness and cervicothoracic ROM and joint mobility in order to further improve pain and function. Impairments: abnormal muscle firing, abnormal or restricted ROM, abnormal movement, activity intolerance, impaired physical strength, lacks appropriate home exercise program, pain with function, poor posture  and poor body mechanics  Understanding of Dx/Px/POC: good   Prognosis: good    Goals  STG - 4 wks  1) pt will demonstrate 90 deg active FE (met)  2) pt will improve pain 50% (met)    LTG - 8-12 wks  1) pt will demonstrate 135 deg active FE (met)  2) pt will improve pain 90% (partially met)    Plan  Planned modality interventions: low level laser therapy  Planned therapy interventions: joint mobilization, manual therapy, body mechanics training, neuromuscular re-education, strengthening, stretching, patient education, postural training, therapeutic activities, therapeutic exercise, home exercise program, functional ROM exercises and flexibility  Frequency: 2x week  Duration in weeks: 4  Treatment plan discussed with: patient        Subjective Evaluation    History of Present Illness  Mechanism of injury: Pt is a 68 y.o. male with PMHx significant for HTN, CKD II.  He reports a resolution of pain with OH activities, occasional sharp pain with quick or forceful pushing at shoulder height. Patient Goals  Patient goals for therapy: decreased pain, increased motion, return to sport/leisure activities and increased strength    Pain  Current pain ratin  At best pain ratin  At worst pain ratin  Location: L superior, posterior, lateral shoulder  Quality: dull ache and sharp  Relieving factors: rest  Aggravating factors: lifting  Progression: improved    Hand dominance: right      Diagnostic Tests  No diagnostic tests performed  Treatments  Previous treatment: physical therapy  Current treatment: physical therapy        Objective     Postural Observations  Seated posture: poor  Standing posture: poor  Correction of posture: makes symptoms better      Active Range of Motion   Cervical/Thoracic Spine       Cervical  Subcranial protraction:   Restriction level: minimal  Subcranial retraction:   Restriction level: moderate  Flexion:  Restriction level: minimal  Extension:  Restriction level: moderate  Left lateral flexion:  Restriction level: moderate  Right lateral flexion:  Restriction level moderate  Left Shoulder   Flexion: 155 degrees   Extension: 20 degrees   Abduction: 155 degrees   External rotation BTH: C7   Internal rotation BTB: T11     Right Shoulder   Flexion: 155 degrees   Extension: 25 degrees   Abduction: 155 degrees   External rotation BTH: C7   Internal rotation BTB: T11     Additional Active Range of Motion Details  Repeated motions: L:  IR: produced, no worse  EXT: produced, improves, better  Manual c/s retraction OP: abolished    Passive Range of Motion   Left Shoulder   Flexion: 155 degrees   Abduction: 155 degrees   External rotation 90°: 80 degrees   Internal rotation 90°: 60 degrees     Right Shoulder   Flexion: 155 degrees   Abduction: 155 degrees   External rotation 90°: 80 degrees   Internal rotation 90°: 40 degrees     Joint Play   Left Shoulder  Hypomobile in the posterior capsule and inferior capsule.     Right Shoulder  Joints within functional limits are the posterior capsule and inferior capsule. Hypomobile: T1, T2, T3, T4, T5, T6, T7 and T8   Mechanical Assessment    Cervical    Seated retraction: repeated movements   Pain location: centralized  Pain intensity: better  Pain level: abolished  Seated Extension: repeated movements  Pain location: centralized  Pain intensity: better  Pain level: decreased    Thoracic    Seated extension: repeated movements  Pain location: no change    Lumbar      Strength/Myotome Testing     Left Shoulder     Planes of Motion   Flexion: 4+   Abduction: 4   External rotation at 0°: 4+   External rotation at 90°: 4+   Internal rotation at 0°: 4+   Internal rotation at 90°: 4+     Right Shoulder     Planes of Motion   Flexion: 4+   Abduction: 4+   External rotation at 0°: 4+   External rotation at 90°: 4   Internal rotation at 0°: 4+   Internal rotation at 90°: 4     Tests     Left Shoulder   Negative apprehension, drop arm, external rotation lag sign, Neer's and painful arc. Right Shoulder   Negative drop arm and external rotation lag sign. Precautions:  PMHx: HTN, CKD II  Dx: chronic B subacromial impingement, lower c/s derangement with a retraction, extension DP    Manuals 10/26            Gr IV prone C6-T7 PA mobs 1x30 ea            Manual c/s retraction clinician OP 3x10 4x10 5x10                                                                           Neuro Re-Ed             Seated t/s extension 3x10 4x10 5x10                       C/s retraction OP extension 5x10            C/s retraction, B shoulder ABD FROM  1x5                                                                            B TB T's Blue  3x20                         Prone t/s extension 3x20                                      Ther Ex                          Doorway pec stretch 10"x3            TB ER 90/90 Blue  B/  3x15                                                                                          Ther Activity                                       Gait Training                                       Modalities

## 2023-10-31 ENCOUNTER — OFFICE VISIT (OUTPATIENT)
Dept: PHYSICAL THERAPY | Facility: CLINIC | Age: 78
End: 2023-10-31
Payer: MEDICARE

## 2023-10-31 DIAGNOSIS — M25.512 CHRONIC LEFT SHOULDER PAIN: Primary | ICD-10-CM

## 2023-10-31 DIAGNOSIS — G89.29 CHRONIC LEFT SHOULDER PAIN: Primary | ICD-10-CM

## 2023-10-31 DIAGNOSIS — G89.29 CHRONIC RIGHT SHOULDER PAIN: ICD-10-CM

## 2023-10-31 DIAGNOSIS — M25.511 CHRONIC RIGHT SHOULDER PAIN: ICD-10-CM

## 2023-10-31 PROCEDURE — 97110 THERAPEUTIC EXERCISES: CPT | Performed by: PHYSICAL THERAPIST

## 2023-10-31 PROCEDURE — 97112 NEUROMUSCULAR REEDUCATION: CPT | Performed by: PHYSICAL THERAPIST

## 2023-10-31 NOTE — PROGRESS NOTES
Daily Note     Today's date: 10/31/2023  Patient name: Davian Aldana  : 1945  MRN: [id number]  Referring provider: Germán Harry MD  Dx:   Encounter Diagnosis     ICD-10-CM    1. Chronic left shoulder pain  M25.512     G89.29       2. Chronic right shoulder pain  M25.511     G89.29                      Subjective: Pt reports waking up with L shoulder pain and difficulty raising his arm. Objective: See treatment diary below    Assessment: Pt able to abolish pain and normalize ROM and arthrokinematics with c/s retraction, extension. Plan: Cont. POC. Precautions:  PMHx: HTN, CKD II  Dx: chronic B subacromial impingement, lower c/s derangement with a retraction, extension DP    Manuals 10/26 10/31           Gr IV prone C6-T7 PA mobs 1x30 ea 1x30 ea           Manual c/s retraction clinician OP 3x10 3x10 4x10 5x10                                                                          Neuro Re-Ed             Seated t/s extension 3x10 5x10                        C/s retraction OP extension 5x10 3x10  F/b  3x10           C/s retraction, B shoulder ABD FROM  np                                                                            B TB T's Blue  3x20 Blue  3x20                        Prone t/s extension 3x20 3x20                                     Ther Ex                          Doorway pec stretch 10"x3 10"x3           TB ER 90/90 Blue  B/  3x15 Blue  B/  3x15                                                                                         Ther Activity                                       Gait Training                                       Modalities

## 2023-11-02 ENCOUNTER — OFFICE VISIT (OUTPATIENT)
Dept: PHYSICAL THERAPY | Facility: CLINIC | Age: 78
End: 2023-11-02
Payer: MEDICARE

## 2023-11-02 DIAGNOSIS — M25.512 CHRONIC LEFT SHOULDER PAIN: Primary | ICD-10-CM

## 2023-11-02 DIAGNOSIS — M25.512 ACUTE PAIN OF LEFT SHOULDER: ICD-10-CM

## 2023-11-02 DIAGNOSIS — G89.29 CHRONIC RIGHT SHOULDER PAIN: ICD-10-CM

## 2023-11-02 DIAGNOSIS — M25.511 ACUTE PAIN OF RIGHT SHOULDER: ICD-10-CM

## 2023-11-02 DIAGNOSIS — G89.29 CHRONIC LEFT SHOULDER PAIN: Primary | ICD-10-CM

## 2023-11-02 DIAGNOSIS — M25.511 CHRONIC RIGHT SHOULDER PAIN: ICD-10-CM

## 2023-11-02 PROCEDURE — 97110 THERAPEUTIC EXERCISES: CPT

## 2023-11-02 PROCEDURE — 97112 NEUROMUSCULAR REEDUCATION: CPT

## 2023-11-02 NOTE — PROGRESS NOTES
Daily Note     Today's date: 2023  Patient name: Demond Cheema  : 1945  MRN: [id number]  Referring provider: Jessica Mcclure MD  Dx:   Encounter Diagnosis     ICD-10-CM    1. Chronic left shoulder pain  M25.512     G89.29       2. Chronic right shoulder pain  M25.511     G89.29       3. Acute pain of right shoulder  M25.511       4. Acute pain of left shoulder  M25.512           Start Time: 1000  Stop Time: 1030  Total time in clinic (min): 30 minutes    Subjective: Patient has no complaints of pain prior to therapy. Objective: See treatment diary below    Assessment: Patient's AROM presents normal today prior to therapy. Continued Gerson's current PT POC to improve strength. Plan: Cont. POC. Precautions:  PMHx: HTN, CKD II  Dx: chronic B subacromial impingement, lower c/s derangement with a retraction, extension DP    Manuals 10/26 10/31 112          Gr IV prone C6-T7 PA mobs 1x30 ea 1x30 ea           Manual c/s retraction clinician OP 3x10 3x10 nv 5x10                                                                          Neuro Re-Ed             Seated t/s extension 3x10 5x10 5x10                       C/s retraction OP extension 5x10 3x10  F/b  3x10 6x10          C/s retraction, B shoulder ABD FROM  np                                                                            B TB T's Blue  3x20 Blue  3x20 Blue  3x20                       Prone t/s extension 3x20 3x20 3x20                                    Ther Ex                          Doorway pec stretch 10"x3 10"x3 10"x3          TB ER 90/90 Blue  B/  3x15 Blue  B/  3x15 Blue  B/  3x15                                                                                        Ther Activity                                       Gait Training                                       Modalities

## 2023-11-07 ENCOUNTER — OFFICE VISIT (OUTPATIENT)
Dept: PHYSICAL THERAPY | Facility: CLINIC | Age: 78
End: 2023-11-07
Payer: MEDICARE

## 2023-11-07 DIAGNOSIS — M25.512 CHRONIC LEFT SHOULDER PAIN: Primary | ICD-10-CM

## 2023-11-07 DIAGNOSIS — G89.29 CHRONIC RIGHT SHOULDER PAIN: ICD-10-CM

## 2023-11-07 DIAGNOSIS — M25.511 CHRONIC RIGHT SHOULDER PAIN: ICD-10-CM

## 2023-11-07 DIAGNOSIS — G89.29 CHRONIC LEFT SHOULDER PAIN: Primary | ICD-10-CM

## 2023-11-07 PROCEDURE — 97140 MANUAL THERAPY 1/> REGIONS: CPT | Performed by: PHYSICAL THERAPIST

## 2023-11-07 PROCEDURE — 97112 NEUROMUSCULAR REEDUCATION: CPT | Performed by: PHYSICAL THERAPIST

## 2023-11-07 PROCEDURE — 97110 THERAPEUTIC EXERCISES: CPT | Performed by: PHYSICAL THERAPIST

## 2023-11-07 NOTE — PROGRESS NOTES
Daily Note     Today's date: 2023  Patient name: Jeane Martínez  : 1945  MRN: [id number]  Referring provider: Kateryna Serna MD  Dx:   Encounter Diagnosis     ICD-10-CM    1. Chronic left shoulder pain  M25.512     G89.29       2. Chronic right shoulder pain  M25.511     G89.29                      Subjective: Pt reports L>R superior shoulder pain after extensive use of a leaf blower on Saturday. He reports HEP 1 time/day that provided relief but did not abolish. Objective: See treatment diary below  Instructed pt to perform HEP more frequently and with more reps as tolerated when pain is present  Added OH reaching to address arthrokinematics    Assessment: Pt demonstrated no pain and improved ROM when cued to maintain mid ROM c/s retraction during shoulder TE. Plan: Assess response nv. Add DNF to address postural endurance prn. Precautions:  PMHx: HTN, CKD II  Dx: chronic B subacromial impingement, lower c/s derangement with a retraction, extension DP    Manuals 10/26 10/31 11/2 11/7         Gr IV prone C6-T7 PA mobs 1x30 ea 1x30 ea  1x30 ea         Manual c/s retraction clinician OP 3x10 3x10 nv 5x10                                                                          Neuro Re-Ed             Seated t/s extension 3x10 5x10 5x10 5x10                      C/s retraction OP extension 5x10 3x10  F/b  3x10 6x10 5x10         C/s retraction, B shoulder ABD, FF FROM  np  0#/  1x5 ea  1#/  1x5 ea                                                                          B TB T's Blue  3x20 Blue  3x20 Blue  3x20 Blue  3x20                      Prone t/s extension 3x20 3x20 3x20 3x20                                   Ther Ex                          Doorway pec stretch 10"x3 10"x3 10"x3 15"x3         TB ER 90/90 Blue  B/  3x15 Blue  B/  3x15 Blue  B/  3x15 Blue  B/  3x15                                                                                       Ther Activity Gait Training                                       Modalities

## 2023-11-09 ENCOUNTER — APPOINTMENT (OUTPATIENT)
Dept: PHYSICAL THERAPY | Facility: CLINIC | Age: 78
End: 2023-11-09
Payer: MEDICARE

## 2023-11-14 ENCOUNTER — OFFICE VISIT (OUTPATIENT)
Dept: PHYSICAL THERAPY | Facility: CLINIC | Age: 78
End: 2023-11-14
Payer: MEDICARE

## 2023-11-14 DIAGNOSIS — M25.512 ACUTE PAIN OF LEFT SHOULDER: ICD-10-CM

## 2023-11-14 DIAGNOSIS — M25.511 ACUTE PAIN OF RIGHT SHOULDER: ICD-10-CM

## 2023-11-14 DIAGNOSIS — G89.29 CHRONIC RIGHT SHOULDER PAIN: ICD-10-CM

## 2023-11-14 DIAGNOSIS — G89.29 CHRONIC LEFT SHOULDER PAIN: Primary | ICD-10-CM

## 2023-11-14 DIAGNOSIS — M25.511 CHRONIC RIGHT SHOULDER PAIN: ICD-10-CM

## 2023-11-14 DIAGNOSIS — M25.512 CHRONIC LEFT SHOULDER PAIN: Primary | ICD-10-CM

## 2023-11-14 PROCEDURE — 97110 THERAPEUTIC EXERCISES: CPT

## 2023-11-14 PROCEDURE — 97112 NEUROMUSCULAR REEDUCATION: CPT

## 2023-11-14 NOTE — PROGRESS NOTES
Daily Note     Today's date: 2023  Patient name: Leonid Mckay  : 1945  MRN: [id number]  Referring provider: Vick Baltazar MD  Dx:   Encounter Diagnosis     ICD-10-CM    1. Chronic left shoulder pain  M25.512     G89.29       2. Chronic right shoulder pain  M25.511     G89.29       3. Acute pain of right shoulder  M25.511       4. 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 We fed somewhere around 500 people". He reports having no radicular symptoms and minimal soreness. Objective: See treatment diary below    Assessment: Patient able to complete all TE listed below with no increase in pain. He demonstrates good form during FROM lifting. Nanci Parra is progressing really well with the current PT POC. Plan: Assess response nv. Add DNF to address postural endurance prn. Precautions:  PMHx: HTN, CKD II  Dx: chronic B subacromial impingement, lower c/s derangement with a retraction, extension DP    Manuals 10/26 10/31 11/2 11/7 11/14        Gr IV prone C6-T7 PA mobs 1x30 ea 1x30 ea  1x30 ea         Manual c/s retraction clinician OP 3x10 3x10 nv 5x10                                                                          Neuro Re-Ed             Seated t/s extension 3x10 5x10 5x10 5x10 5x10                     C/s retraction OP extension 5x10 3x10  F/b  3x10 6x10 5x10 5x10        C/s retraction, B shoulder ABD, FF FROM  np  0#/  1x5 ea  1#/  1x5 ea 0#/  1x5 ea  1#/  1x5 ea        DNF     NV                                                            B TB T's Blue  3x20 Blue  3x20 Blue  3x20 Blue  3x20 Blue  3x20                     Prone t/s extension 3x20 3x20 3x20 3x20 3x20                                  Ther Ex                          Doorway pec stretch 10"x3 10"x3 10"x3 15"x3 15"x3        TB ER 90/90 Blue  B/  3x15 Blue  B/  3x15 Blue  B/  3x15 Blue  B/  3x15 Blue  B/  3x15                                                                                      Ther Activity                                       Gait Training                                       Modalities

## 2023-11-16 ENCOUNTER — OFFICE VISIT (OUTPATIENT)
Dept: PHYSICAL THERAPY | Facility: CLINIC | Age: 78
End: 2023-11-16
Payer: MEDICARE

## 2023-11-16 DIAGNOSIS — M25.512 CHRONIC LEFT SHOULDER PAIN: Primary | ICD-10-CM

## 2023-11-16 DIAGNOSIS — G89.29 CHRONIC RIGHT SHOULDER PAIN: ICD-10-CM

## 2023-11-16 DIAGNOSIS — G89.29 CHRONIC LEFT SHOULDER PAIN: Primary | ICD-10-CM

## 2023-11-16 DIAGNOSIS — M25.511 CHRONIC RIGHT SHOULDER PAIN: ICD-10-CM

## 2023-11-16 PROCEDURE — 97112 NEUROMUSCULAR REEDUCATION: CPT | Performed by: PHYSICAL THERAPIST

## 2023-11-16 PROCEDURE — 97140 MANUAL THERAPY 1/> REGIONS: CPT | Performed by: PHYSICAL THERAPIST

## 2023-11-16 NOTE — PROGRESS NOTES
Daily Note     Today's date: 2023  Patient name: Sher De La Vega  : 1945  MRN: [id number]  Referring provider: Grover Rothman MD  Dx:   Encounter Diagnosis     ICD-10-CM    1. Chronic left shoulder pain  M25.512     G89.29       2. Chronic right shoulder pain  M25.511     G89.29                      Subjective: Pt reports only mild and infrequent L>R shoulder pain with OH reaching that he can abolish with HEP. Objective: See treatment diary below    Assessment: Pt demonstrated normalized B shoulder arthrokinematics. Plan: Cont. POC. Precautions:  PMHx: HTN, CKD II  Dx: chronic B subacromial impingement, lower c/s derangement with a retraction, extension DP    Manuals 10/26 10/31 11/2 11/7 11/14 11/16       Gr IV prone C6-T7 PA mobs 1x30 ea 1x30 ea  1x30 ea  1x20 ea       Manual c/s retraction clinician OP 3x10 3x10 nv 5x10  1x15                                                                        Neuro Re-Ed             Seated t/s extension 3x10 5x10 5x10 5x10 5x10 5x10                    C/s retraction OP extension 5x10 3x10  F/b  3x10 6x10 5x10 5x10 5x10       C/s retraction, B shoulder ABD, FF FROM  np  0#/  1x5 ea  1#/  1x5 ea 0#/  1x5 ea  1#/  1x5 ea 1#  1x10 ea       DNF     NV 5"x5                                                           B TB T's Blue  3x20 Blue  3x20 Blue  3x20 Blue  3x20 Blue  3x20 Blue  3x20                    Prone t/s extension 3x20 3x20 3x20 3x20 3x20 3x20                                 Ther Ex                          Doorway pec stretch 10"x3 10"x3 10"x3 15"x3 15"x3 15"x3       TB ER 90/90 Blue  B/  3x15 Blue  B/  3x15 Blue  B/  3x15 Blue  B/  3x15 Blue  B/  3x15 Blue  B/  3x15                                                                                     Ther Activity                                       Gait Training                                       Modalities

## 2023-11-18 DIAGNOSIS — K21.9 GASTROESOPHAGEAL REFLUX DISEASE WITHOUT ESOPHAGITIS: ICD-10-CM

## 2023-11-18 DIAGNOSIS — K21.9 LARYNGOPHARYNGEAL REFLUX (LPR): ICD-10-CM

## 2023-11-20 RX ORDER — OMEPRAZOLE 40 MG/1
40 CAPSULE, DELAYED RELEASE ORAL 2 TIMES DAILY
Qty: 180 CAPSULE | Refills: 0 | Status: SHIPPED | OUTPATIENT
Start: 2023-11-20

## 2023-11-21 ENCOUNTER — APPOINTMENT (OUTPATIENT)
Dept: PHYSICAL THERAPY | Facility: CLINIC | Age: 78
End: 2023-11-21
Payer: MEDICARE

## 2023-11-28 ENCOUNTER — OFFICE VISIT (OUTPATIENT)
Dept: PHYSICAL THERAPY | Facility: CLINIC | Age: 78
End: 2023-11-28
Payer: MEDICARE

## 2023-11-28 DIAGNOSIS — M25.511 ACUTE PAIN OF RIGHT SHOULDER: ICD-10-CM

## 2023-11-28 DIAGNOSIS — M25.512 ACUTE PAIN OF LEFT SHOULDER: ICD-10-CM

## 2023-11-28 DIAGNOSIS — M25.512 CHRONIC LEFT SHOULDER PAIN: Primary | ICD-10-CM

## 2023-11-28 DIAGNOSIS — G89.29 CHRONIC RIGHT SHOULDER PAIN: ICD-10-CM

## 2023-11-28 DIAGNOSIS — G89.29 CHRONIC LEFT SHOULDER PAIN: Primary | ICD-10-CM

## 2023-11-28 DIAGNOSIS — M25.511 CHRONIC RIGHT SHOULDER PAIN: ICD-10-CM

## 2023-11-28 PROCEDURE — 97112 NEUROMUSCULAR REEDUCATION: CPT

## 2023-11-28 PROCEDURE — 97110 THERAPEUTIC EXERCISES: CPT

## 2023-11-28 NOTE — PROGRESS NOTES
Daily Note     Today's date: 2023  Patient name: Kin Tolbert  : 1945  MRN: [id number]  Referring provider: Tavo Whitney MD  Dx:   Encounter Diagnosis     ICD-10-CM    1. Chronic left shoulder pain  M25.512     G89.29       2. Chronic right shoulder pain  M25.511     G89.29       3. Acute pain of right shoulder  M25.511       4. Acute pain of left shoulder  M25.512           Start Time: 1030  Stop Time: 1110  Total time in clinic (min): 40 minutes    Subjective: Fabrizio Simon notes moments of pain over the week that were easily managed on his own by utilizing his HEP. Objective: See treatment diary below    Assessment: Patient continues to show good progress and understanding of his exercise routine which has kept his pain abolished. Plan: Cont. POC. Precautions:  PMHx: HTN, CKD II  Dx: chronic B subacromial impingement, lower c/s derangement with a retraction, extension DP    Manuals 10/26 10/31 11/2 11/7 11/14 11/16 11/28      Gr IV prone C6-T7 PA mobs 1x30 ea 1x30 ea  1x30 ea  1x20 ea       Manual c/s retraction clinician OP 3x10 3x10 nv 5x10  1x15                                                                        Neuro Re-Ed             Seated t/s extension 3x10 5x10 5x10 5x10 5x10 5x10 5x10                   C/s retraction OP extension 5x10 3x10  F/b  3x10 6x10 5x10 5x10 5x10 5x10      C/s retraction, B shoulder ABD, FF FROM  np  0#/  1x5 ea  1#/  1x5 ea 0#/  1x5 ea  1#/  1x5 ea 1#  1x10 ea 1#  1x10 ea      DNF     NV 5"x5 5"x10                                                          B TB T's Blue  3x20 Blue  3x20 Blue  3x20 Blue  3x20 Blue  3x20 Blue  3x20 Blue  3x20                   Prone t/s extension 3x20 3x20 3x20 3x20 3x20 3x20 3x20                                Ther Ex                          Doorway pec stretch 10"x3 10"x3 10"x3 15"x3 15"x3 15"x3 15"x3      TB ER 90/90 Blue  B/  3x15 Blue  B/  3x15 Blue  B/  3x15 Blue  B/  3x15 Blue  B/  3x15 Blue  B/  3x15 Blue  B/  3x20                                                                                    Ther Activity                                       Gait Training                                       Modalities

## 2023-11-29 ENCOUNTER — APPOINTMENT (OUTPATIENT)
Dept: RADIOLOGY | Age: 78
End: 2023-11-29
Payer: MEDICARE

## 2023-11-29 ENCOUNTER — OFFICE VISIT (OUTPATIENT)
Dept: PODIATRY | Facility: CLINIC | Age: 78
End: 2023-11-29
Payer: MEDICARE

## 2023-11-29 VITALS
HEART RATE: 69 BPM | BODY MASS INDEX: 36.48 KG/M2 | HEIGHT: 66 IN | WEIGHT: 227 LBS | SYSTOLIC BLOOD PRESSURE: 147 MMHG | DIASTOLIC BLOOD PRESSURE: 77 MMHG

## 2023-11-29 DIAGNOSIS — M20.5X2 ACQUIRED ADDUCTOVARUS ROTATION OF TOE, LEFT: Primary | ICD-10-CM

## 2023-11-29 DIAGNOSIS — M79.675 PAIN OF TOE OF LEFT FOOT: ICD-10-CM

## 2023-11-29 DIAGNOSIS — L84 CORN OF TOE: ICD-10-CM

## 2023-11-29 PROCEDURE — 99203 OFFICE O/P NEW LOW 30 MIN: CPT | Performed by: PODIATRIST

## 2023-11-29 PROCEDURE — 73630 X-RAY EXAM OF FOOT: CPT

## 2023-11-29 NOTE — PROGRESS NOTES
Assessment/Plan:       Diagnoses and all orders for this visit:    Acquired adductovarus rotation of toe, left  -     X-ray foot left 3+ views; Future    Carteret of toe        Diagnosis and options discussed with patient  Patient agreeable to the plan as stated below    XRay 3 views of the left foot personally read by Dr. Court Bailey in office today and discussed with patient:    There is no acute fracture or dislocation. Moderate midfoot degenerative changes. No lytic or blastic osseous lesion. Soft tissues are unremarkable. Specifically, the 5th toe is adducted and rotated but no acute findings. Small heel spurs noted    Silipos toe pads, keep toes . No need for derotational arthroplasty at this time. Pads provided    Subjective:      Patient ID: Davian Aldana is a 66 y.o. male. Patients left 4,5 toes rub together and cause pain. He has had this for months. The two toes overlap and tend to rub. There was a skin lesion but he thinks it might have gotten better. The following portions of the patient's history were reviewed and updated as appropriate: allergies, current medications, past family history, past medical history, past social history, past surgical history, and problem list.    Review of Systems    Constitutional: Negative. Respiratory: Negative for cough and shortness of breath. Gastrointestinal: Negative for diarrhea, nausea and vomiting. Musculoskeletal: toe pain  Skin: skin lesion between toes  Neurological: Negative for weakness, numbness and headaches. Objective:      /77   Pulse 69   Ht 5' 6" (1.676 m)   Wt 103 kg (227 lb)   BMI 36.64 kg/m²          Physical Exam  Vitals reviewed. Constitutional:       Appearance: He is obese. He is not ill-appearing or diaphoretic. Cardiovascular:      Pulses: Normal pulses. Pulmonary:      Effort: No respiratory distress.    Musculoskeletal:         General: Deformity (adductovarus rotation of toe) present. Skin:     Findings: Lesion (interdigital kissing lesions left 4,5 toes) present. Neurological:      Mental Status: He is alert and oriented to person, place, and time.

## 2023-11-30 ENCOUNTER — OFFICE VISIT (OUTPATIENT)
Dept: PHYSICAL THERAPY | Facility: CLINIC | Age: 78
End: 2023-11-30
Payer: MEDICARE

## 2023-11-30 DIAGNOSIS — G89.29 CHRONIC LEFT SHOULDER PAIN: Primary | ICD-10-CM

## 2023-11-30 DIAGNOSIS — G89.29 CHRONIC RIGHT SHOULDER PAIN: ICD-10-CM

## 2023-11-30 DIAGNOSIS — M25.511 CHRONIC RIGHT SHOULDER PAIN: ICD-10-CM

## 2023-11-30 DIAGNOSIS — M25.512 CHRONIC LEFT SHOULDER PAIN: Primary | ICD-10-CM

## 2023-11-30 PROCEDURE — 97112 NEUROMUSCULAR REEDUCATION: CPT | Performed by: PHYSICAL THERAPIST

## 2023-11-30 NOTE — PROGRESS NOTES
Daily Note     Today's date: 2023  Patient name: Ellen Mayo  : 1945  MRN: [id number]  Referring provider: Onel Chambers MD  Dx:   Encounter Diagnosis     ICD-10-CM    1. Chronic left shoulder pain  M25.512     G89.29       2. Chronic right shoulder pain  M25.511     G89.29                      Subjective: Pt reports brief, occasional posterior B shoulder pain that he can abolish quickly with HEP. Objective: See treatment diary below    Assessment: Pt demonstrated full, pain-free B shoulder active FE. Plan: Cont. POC. Prepare for d/c in 2 visits. Precautions:  PMHx: HTN, CKD II  Dx: chronic B subacromial impingement, lower c/s derangement with a retraction, extension DP    Manuals 10/26 10/31 11/2 11/7 11/14 11/16 11/28 11/30     Gr IV prone C6-T7 PA mobs 1x30 ea 1x30 ea  1x30 ea  1x20 ea  1x20 ea     Manual c/s retraction clinician OP 3x10 3x10 nv 5x10  1x15  1x10                                                                      Neuro Re-Ed             Seated t/s extension 3x10 5x10 5x10 5x10 5x10 5x10 5x10 5x10                  C/s retraction OP extension 5x10 3x10  F/b  3x10 6x10 5x10 5x10 5x10 5x10 5x10     C/s retraction, B shoulder ABD, FF FROM  np  0#/  1x5 ea  1#/  1x5 ea 0#/  1x5 ea  1#/  1x5 ea 1#  1x10 ea 1#  1x10 ea 1#  1x10 ea     DNF     NV 5"x5 5"x10 5"x10                                                         B TB T's Blue  3x20 Blue  3x20 Blue  3x20 Blue  3x20 Blue  3x20 Blue  3x20 Blue  3x20 Blue  3x20                  Prone t/s extension 3x20 3x20 3x20 3x20 3x20 3x20 3x20 3x20                               Ther Ex                          Doorway pec stretch 10"x3 10"x3 10"x3 15"x3 15"x3 15"x3 15"x3 15"x3     TB ER 90/90 Blue  B/  3x15 Blue  B/  3x15 Blue  B/  3x15 Blue  B/  3x15 Blue  B/  3x15 Blue  B/  3x15 Blue  B/  3x20 Blue  B/  3x20                                                                                   Ther Activity Gait Training                                       Modalities

## 2023-12-05 ENCOUNTER — OFFICE VISIT (OUTPATIENT)
Dept: PHYSICAL THERAPY | Facility: CLINIC | Age: 78
End: 2023-12-05
Payer: MEDICARE

## 2023-12-05 DIAGNOSIS — M25.512 CHRONIC LEFT SHOULDER PAIN: Primary | ICD-10-CM

## 2023-12-05 DIAGNOSIS — M25.511 ACUTE PAIN OF RIGHT SHOULDER: ICD-10-CM

## 2023-12-05 DIAGNOSIS — M25.512 ACUTE PAIN OF LEFT SHOULDER: ICD-10-CM

## 2023-12-05 DIAGNOSIS — G89.29 CHRONIC LEFT SHOULDER PAIN: Primary | ICD-10-CM

## 2023-12-05 DIAGNOSIS — G89.29 CHRONIC RIGHT SHOULDER PAIN: ICD-10-CM

## 2023-12-05 DIAGNOSIS — M25.511 CHRONIC RIGHT SHOULDER PAIN: ICD-10-CM

## 2023-12-05 PROCEDURE — 97112 NEUROMUSCULAR REEDUCATION: CPT

## 2023-12-05 NOTE — PROGRESS NOTES
Daily Note     Today's date: 2023  Patient name: Lisy Patel  : 1945  MRN: [id number]  Referring provider: Stan Rios MD  Dx:   Encounter Diagnosis     ICD-10-CM    1. Chronic left shoulder pain  M25.512     G89.29       2. Chronic right shoulder pain  M25.511     G89.29       3. Acute pain of right shoulder  M25.511       4. Acute pain of left shoulder  M25.512             Start Time: 1100  Stop Time: 1145  Total time in clinic (min): 45 minutes    Subjective: Patient reports minimal shoulder pain over the weekend that is managed with his HEP well. Objective: See treatment diary below    Assessment: Patient has really good understanding utilizing his HEP for pain management. Plan: Cont. POC. Prepare for d/c in 1 visits. Precautions:  PMHx: HTN, CKD II  Dx: chronic B subacromial impingement, lower c/s derangement with a retraction, extension DP    Manuals 10/26 10/31 11/2 11/7 11/14 11/16 11/28 11/30 12/5    Gr IV prone C6-T7 PA mobs 1x30 ea 1x30 ea  1x30 ea  1x20 ea  1x20 ea     Manual c/s retraction clinician OP 3x10 3x10 nv 5x10  1x15  1x10                                                                      Neuro Re-Ed             Seated t/s extension 3x10 5x10 5x10 5x10 5x10 5x10 5x10 5x10 5x10                 C/s retraction OP extension 5x10 3x10  F/b  3x10 6x10 5x10 5x10 5x10 5x10 5x10 5x10    C/s retraction, B shoulder ABD, FF FROM  np  0#/  1x5 ea  1#/  1x5 ea 0#/  1x5 ea  1#/  1x5 ea 1#  1x10 ea 1#  1x10 ea 1#  1x10 ea 1#  1x10 ea    DNF     NV 5"x5 5"x10 5"x10 5"x10                                                        B TB T's Blue  3x20 Blue  3x20 Blue  3x20 Blue  3x20 Blue  3x20 Blue  3x20 Blue  3x20 Blue  3x20 Blue  3x20                 Prone t/s extension 3x20 3x20 3x20 3x20 3x20 3x20 3x20 3x20 3x20                              Ther Ex                          Doorway pec stretch 10"x3 10"x3 10"x3 15"x3 15"x3 15"x3 15"x3 15"x3 15"x3    TB ER 90/90 Blue  B/  3x15 Blue  B/  3x15 Blue  B/  3x15 Blue  B/  3x15 Blue  B/  3x15 Blue  B/  3x15 Blue  B/  3x20 Blue  B/  3x20 Blue  B/  3x20                                                                                  Ther Activity                                       Gait Training                                       Modalities

## 2023-12-07 ENCOUNTER — APPOINTMENT (OUTPATIENT)
Dept: PHYSICAL THERAPY | Facility: CLINIC | Age: 78
End: 2023-12-07
Payer: MEDICARE

## 2023-12-12 ENCOUNTER — EVALUATION (OUTPATIENT)
Dept: PHYSICAL THERAPY | Facility: CLINIC | Age: 78
End: 2023-12-12
Payer: MEDICARE

## 2023-12-12 DIAGNOSIS — G89.29 CHRONIC RIGHT SHOULDER PAIN: ICD-10-CM

## 2023-12-12 DIAGNOSIS — G89.29 CHRONIC LEFT SHOULDER PAIN: Primary | ICD-10-CM

## 2023-12-12 DIAGNOSIS — M25.511 CHRONIC RIGHT SHOULDER PAIN: ICD-10-CM

## 2023-12-12 DIAGNOSIS — M25.512 CHRONIC LEFT SHOULDER PAIN: Primary | ICD-10-CM

## 2023-12-12 PROCEDURE — 97112 NEUROMUSCULAR REEDUCATION: CPT | Performed by: PHYSICAL THERAPIST

## 2023-12-12 PROCEDURE — 97110 THERAPEUTIC EXERCISES: CPT | Performed by: PHYSICAL THERAPIST

## 2023-12-12 NOTE — PROGRESS NOTES
Daily Note     Today's date: 2023  Patient name: Sher De La Vega  : 1945  MRN: [id number]  Referring provider: Grover Rothman MD  Dx:   Encounter Diagnosis     ICD-10-CM    1. Chronic left shoulder pain  M25.512     G89.29       2. Chronic right shoulder pain  M25.511     G89.29                        Subjective: Pt reports a resolution of L shoulder pain and only occasional R shoulder that he can abolish quickly with HEP. Objective: See treatment diary below    Assessment: Pt is appropriate for d/c from skilled PT services to a maintenance HEP at this time. Plan: D/C to HEP. Precautions:  PMHx: HTN, CKD II  Dx: chronic B subacromial impingement, lower c/s derangement with a retraction, extension DP    Manuals 10/26 10/31 11/2 11/7 11/14 11/16 11/28 11/30 12/5 12/12   Gr IV prone C6-T7 PA mobs 1x30 ea 1x30 ea  1x30 ea  1x20 ea  1x20 ea  1x20 ea   Manual c/s retraction clinician OP 3x10 3x10 nv 5x10  1x15  1x10                                                                      Neuro Re-Ed             Seated t/s extension 3x10 5x10 5x10 5x10 5x10 5x10 5x10 5x10 5x10 5x10                C/s retraction OP extension 5x10 3x10  F/b  3x10 6x10 5x10 5x10 5x10 5x10 5x10 5x10 5x10   C/s retraction, B shoulder ABD, FF FROM  np  0#/  1x5 ea  1#/  1x5 ea 0#/  1x5 ea  1#/  1x5 ea 1#  1x10 ea 1#  1x10 ea 1#  1x10 ea 1#  1x10 ea 1#  1x10 ea   DNF     NV 5"x5 5"x10 5"x10 5"x10 5"x10                                                       B TB T's Blue  3x20 Blue  3x20 Blue  3x20 Blue  3x20 Blue  3x20 Blue  3x20 Blue  3x20 Blue  3x20 Blue  3x20 Blue  3x20                Prone t/s extension 3x20 3x20 3x20 3x20 3x20 3x20 3x20 3x20 3x20 3x20                             Ther Ex                          Doorway pec stretch 10"x3 10"x3 10"x3 15"x3 15"x3 15"x3 15"x3 15"x3 15"x3 15"x3   TB ER 90/90 Blue  B/  3x15 Blue  B/  3x15 Blue  B/  3x15 Blue  B/  3x15 Blue  B/  3x15 Blue  B/  3x15 Blue  B/  3x20 Blue  B/  3x20 Blue  B/  3x20 Blue  B/  3x20                                                                                 Ther Activity                                       Gait Training                                       Modalities

## 2023-12-13 DIAGNOSIS — H81.319 AUDITORY VERTIGO, UNSPECIFIED LATERALITY: ICD-10-CM

## 2023-12-13 RX ORDER — TRIAMTERENE AND HYDROCHLOROTHIAZIDE 37.5; 25 MG/1; MG/1
CAPSULE ORAL
Qty: 90 CAPSULE | Refills: 1 | Status: SHIPPED | OUTPATIENT
Start: 2023-12-13

## 2023-12-14 ENCOUNTER — APPOINTMENT (OUTPATIENT)
Dept: PHYSICAL THERAPY | Facility: CLINIC | Age: 78
End: 2023-12-14
Payer: MEDICARE

## 2023-12-19 ENCOUNTER — APPOINTMENT (OUTPATIENT)
Dept: PHYSICAL THERAPY | Facility: CLINIC | Age: 78
End: 2023-12-19
Payer: MEDICARE

## 2023-12-21 ENCOUNTER — APPOINTMENT (OUTPATIENT)
Dept: PHYSICAL THERAPY | Facility: CLINIC | Age: 78
End: 2023-12-21
Payer: MEDICARE

## 2024-02-25 DIAGNOSIS — K21.9 LARYNGOPHARYNGEAL REFLUX (LPR): ICD-10-CM

## 2024-02-25 DIAGNOSIS — K21.9 GASTROESOPHAGEAL REFLUX DISEASE WITHOUT ESOPHAGITIS: ICD-10-CM

## 2024-02-25 RX ORDER — OMEPRAZOLE 40 MG/1
40 CAPSULE, DELAYED RELEASE ORAL 2 TIMES DAILY
Qty: 180 CAPSULE | Refills: 1 | Status: SHIPPED | OUTPATIENT
Start: 2024-02-25

## 2024-03-25 ENCOUNTER — LAB (OUTPATIENT)
Dept: LAB | Facility: CLINIC | Age: 79
End: 2024-03-25
Payer: MEDICARE

## 2024-03-25 DIAGNOSIS — R73.03 PREDIABETES: ICD-10-CM

## 2024-03-25 DIAGNOSIS — N18.2 CHRONIC KIDNEY DISEASE (CKD), STAGE II (MILD): ICD-10-CM

## 2024-03-25 DIAGNOSIS — E78.2 MIXED HYPERLIPIDEMIA: ICD-10-CM

## 2024-03-25 LAB
ALBUMIN SERPL BCP-MCNC: 4.4 G/DL (ref 3.5–5)
ALP SERPL-CCNC: 62 U/L (ref 34–104)
ALT SERPL W P-5'-P-CCNC: 12 U/L (ref 7–52)
ANION GAP SERPL CALCULATED.3IONS-SCNC: 6 MMOL/L (ref 4–13)
AST SERPL W P-5'-P-CCNC: 16 U/L (ref 13–39)
BILIRUB SERPL-MCNC: 0.42 MG/DL (ref 0.2–1)
BUN SERPL-MCNC: 17 MG/DL (ref 5–25)
CALCIUM SERPL-MCNC: 9.1 MG/DL (ref 8.4–10.2)
CHLORIDE SERPL-SCNC: 101 MMOL/L (ref 96–108)
CHOLEST SERPL-MCNC: 188 MG/DL
CO2 SERPL-SCNC: 32 MMOL/L (ref 21–32)
CREAT SERPL-MCNC: 1.02 MG/DL (ref 0.6–1.3)
EST. AVERAGE GLUCOSE BLD GHB EST-MCNC: 131 MG/DL
GFR SERPL CREATININE-BSD FRML MDRD: 69 ML/MIN/1.73SQ M
GLUCOSE P FAST SERPL-MCNC: 103 MG/DL (ref 65–99)
HBA1C MFR BLD: 6.2 %
HDLC SERPL-MCNC: 41 MG/DL
LDLC SERPL CALC-MCNC: 113 MG/DL (ref 0–100)
NONHDLC SERPL-MCNC: 147 MG/DL
POTASSIUM SERPL-SCNC: 4.3 MMOL/L (ref 3.5–5.3)
PROT SERPL-MCNC: 7.1 G/DL (ref 6.4–8.4)
SODIUM SERPL-SCNC: 139 MMOL/L (ref 135–147)
TRIGL SERPL-MCNC: 171 MG/DL

## 2024-03-25 PROCEDURE — 36415 COLL VENOUS BLD VENIPUNCTURE: CPT

## 2024-03-25 PROCEDURE — 83036 HEMOGLOBIN GLYCOSYLATED A1C: CPT

## 2024-03-25 PROCEDURE — 80053 COMPREHEN METABOLIC PANEL: CPT

## 2024-03-25 PROCEDURE — 80061 LIPID PANEL: CPT

## 2024-04-01 ENCOUNTER — OFFICE VISIT (OUTPATIENT)
Dept: INTERNAL MEDICINE CLINIC | Facility: CLINIC | Age: 79
End: 2024-04-01
Payer: MEDICARE

## 2024-04-01 VITALS
DIASTOLIC BLOOD PRESSURE: 72 MMHG | TEMPERATURE: 96.7 F | HEIGHT: 66 IN | HEART RATE: 55 BPM | BODY MASS INDEX: 36.87 KG/M2 | WEIGHT: 229.4 LBS | OXYGEN SATURATION: 95 % | SYSTOLIC BLOOD PRESSURE: 120 MMHG

## 2024-04-01 DIAGNOSIS — E66.01 CLASS 2 SEVERE OBESITY DUE TO EXCESS CALORIES WITH SERIOUS COMORBIDITY AND BODY MASS INDEX (BMI) OF 37.0 TO 37.9 IN ADULT (HCC): ICD-10-CM

## 2024-04-01 DIAGNOSIS — M25.512 CHRONIC LEFT SHOULDER PAIN: ICD-10-CM

## 2024-04-01 DIAGNOSIS — E53.8 VITAMIN B12 DEFICIENCY: ICD-10-CM

## 2024-04-01 DIAGNOSIS — K21.9 LARYNGOPHARYNGEAL REFLUX (LPR): ICD-10-CM

## 2024-04-01 DIAGNOSIS — G24.5 EYE TWITCH: Primary | ICD-10-CM

## 2024-04-01 DIAGNOSIS — Z00.00 HEALTH MAINTENANCE EXAMINATION: ICD-10-CM

## 2024-04-01 DIAGNOSIS — I10 ESSENTIAL HYPERTENSION: ICD-10-CM

## 2024-04-01 DIAGNOSIS — E78.2 MIXED HYPERLIPIDEMIA: ICD-10-CM

## 2024-04-01 DIAGNOSIS — H81.319 AUDITORY VERTIGO, UNSPECIFIED LATERALITY: ICD-10-CM

## 2024-04-01 DIAGNOSIS — N18.2 CHRONIC KIDNEY DISEASE (CKD), STAGE II (MILD): ICD-10-CM

## 2024-04-01 DIAGNOSIS — G89.29 CHRONIC LEFT SHOULDER PAIN: ICD-10-CM

## 2024-04-01 DIAGNOSIS — R73.03 PREDIABETES: ICD-10-CM

## 2024-04-01 PROCEDURE — G0439 PPPS, SUBSEQ VISIT: HCPCS | Performed by: INTERNAL MEDICINE

## 2024-04-01 PROCEDURE — 99214 OFFICE O/P EST MOD 30 MIN: CPT | Performed by: INTERNAL MEDICINE

## 2024-04-01 RX ORDER — TRIAMTERENE AND HYDROCHLOROTHIAZIDE 37.5; 25 MG/1; MG/1
1 CAPSULE ORAL DAILY
Qty: 90 CAPSULE | Refills: 1 | Status: SHIPPED | OUTPATIENT
Start: 2024-04-01

## 2024-04-01 RX ORDER — PRAVASTATIN SODIUM 20 MG
20 TABLET ORAL DAILY
Qty: 90 TABLET | Refills: 1 | Status: SHIPPED | OUTPATIENT
Start: 2024-04-01

## 2024-04-01 NOTE — PROGRESS NOTES
"Assessment/Plan:    Chronic left shoulder pain  Stable, went to PT.  Discussed imaging, Ortho referral.    Essential hypertension  BP stable, on diuretics.    Laryngopharyngeal reflux (LPR)  Taking PPI bid.    Chronic kidney disease (CKD), stage II (mild)  Lab Results   Component Value Date    EGFR 69 03/25/2024    EGFR 70 10/13/2023    EGFR 65 09/26/2023    CREATININE 1.02 03/25/2024    CREATININE 1.01 10/13/2023    CREATININE 1.08 09/26/2023     Stable.  Avoid NSAIDs.    Class 2 severe obesity due to excess calories with serious comorbidity and body mass index (BMI) of 37.0 to 37.9 in adult (HCC)  Stable weight.    Mixed hyperlipidemia  Lipids remain elevated.  Discussed changing statin, on pravastatin 10 mg.    Prediabetes  A1c improved to 6.2%.  Not on medication.    Eye twitch  S/p Botox #2.  Symptoms have improved.     Diagnoses and all orders for this visit:    Eye twitch    Mixed hyperlipidemia    Auditory vertigo, unspecified laterality    Chronic kidney disease (CKD), stage II (mild)    Laryngopharyngeal reflux (LPR)          Subjective:      Patient ID: Franky Hollis is a 79 y.o. male.    Botox x2 6 mo        The following portions of the patient's history were reviewed and updated as appropriate: allergies, current medications, past medical history, past social history, and problem list.    Review of Systems      Objective:      /72   Pulse 55   Temp (!) 96.7 °F (35.9 °C)   Ht 5' 6\" (1.676 m)   Wt 104 kg (229 lb 6.4 oz)   SpO2 95%   BMI 37.03 kg/m²          Physical Exam      "

## 2024-04-01 NOTE — ASSESSMENT & PLAN NOTE
Lab Results   Component Value Date    EGFR 69 03/25/2024    EGFR 70 10/13/2023    EGFR 65 09/26/2023    CREATININE 1.02 03/25/2024    CREATININE 1.01 10/13/2023    CREATININE 1.08 09/26/2023     Stable.  Avoid NSAIDs.
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
## 2024-08-31 DIAGNOSIS — K21.9 GASTROESOPHAGEAL REFLUX DISEASE WITHOUT ESOPHAGITIS: ICD-10-CM

## 2024-08-31 DIAGNOSIS — K21.9 LARYNGOPHARYNGEAL REFLUX (LPR): ICD-10-CM

## 2024-09-01 RX ORDER — OMEPRAZOLE 40 MG/1
40 CAPSULE, DELAYED RELEASE ORAL 2 TIMES DAILY
Qty: 180 CAPSULE | Refills: 1 | Status: SHIPPED | OUTPATIENT
Start: 2024-09-01

## 2024-10-01 ENCOUNTER — RA CDI HCC (OUTPATIENT)
Dept: OTHER | Facility: HOSPITAL | Age: 79
End: 2024-10-01

## 2024-10-05 DIAGNOSIS — H81.319 AUDITORY VERTIGO, UNSPECIFIED LATERALITY: ICD-10-CM

## 2024-10-05 DIAGNOSIS — E78.2 MIXED HYPERLIPIDEMIA: ICD-10-CM

## 2024-10-07 RX ORDER — PRAVASTATIN SODIUM 20 MG
20 TABLET ORAL DAILY
Qty: 90 TABLET | Refills: 0 | Status: SHIPPED | OUTPATIENT
Start: 2024-10-07

## 2024-10-07 RX ORDER — TRIAMTERENE AND HYDROCHLOROTHIAZIDE 37.5; 25 MG/1; MG/1
1 CAPSULE ORAL DAILY
Qty: 90 CAPSULE | Refills: 0 | Status: SHIPPED | OUTPATIENT
Start: 2024-10-07

## 2024-10-08 ENCOUNTER — LAB (OUTPATIENT)
Dept: LAB | Facility: CLINIC | Age: 79
End: 2024-10-08
Payer: MEDICARE

## 2024-10-08 DIAGNOSIS — E78.2 MIXED HYPERLIPIDEMIA: ICD-10-CM

## 2024-10-08 DIAGNOSIS — R73.03 PREDIABETES: ICD-10-CM

## 2024-10-08 DIAGNOSIS — N18.2 CHRONIC KIDNEY DISEASE (CKD), STAGE II (MILD): ICD-10-CM

## 2024-10-08 DIAGNOSIS — E53.8 VITAMIN B12 DEFICIENCY: ICD-10-CM

## 2024-10-08 DIAGNOSIS — I10 ESSENTIAL HYPERTENSION: ICD-10-CM

## 2024-10-08 LAB
ALBUMIN SERPL BCG-MCNC: 4.7 G/DL (ref 3.5–5)
ALP SERPL-CCNC: 60 U/L (ref 34–104)
ALT SERPL W P-5'-P-CCNC: 13 U/L (ref 7–52)
ANION GAP SERPL CALCULATED.3IONS-SCNC: 6 MMOL/L (ref 4–13)
AST SERPL W P-5'-P-CCNC: 17 U/L (ref 13–39)
BASOPHILS # BLD AUTO: 0.05 THOUSANDS/ΜL (ref 0–0.1)
BASOPHILS NFR BLD AUTO: 1 % (ref 0–1)
BILIRUB SERPL-MCNC: 0.79 MG/DL (ref 0.2–1)
BUN SERPL-MCNC: 15 MG/DL (ref 5–25)
CALCIUM SERPL-MCNC: 9.6 MG/DL (ref 8.4–10.2)
CHLORIDE SERPL-SCNC: 98 MMOL/L (ref 96–108)
CHOLEST SERPL-MCNC: 186 MG/DL
CO2 SERPL-SCNC: 34 MMOL/L (ref 21–32)
CREAT SERPL-MCNC: 0.98 MG/DL (ref 0.6–1.3)
EOSINOPHIL # BLD AUTO: 0.17 THOUSAND/ΜL (ref 0–0.61)
EOSINOPHIL NFR BLD AUTO: 3 % (ref 0–6)
ERYTHROCYTE [DISTWIDTH] IN BLOOD BY AUTOMATED COUNT: 13.1 % (ref 11.6–15.1)
EST. AVERAGE GLUCOSE BLD GHB EST-MCNC: 128 MG/DL
GFR SERPL CREATININE-BSD FRML MDRD: 73 ML/MIN/1.73SQ M
GLUCOSE P FAST SERPL-MCNC: 99 MG/DL (ref 65–99)
HBA1C MFR BLD: 6.1 %
HCT VFR BLD AUTO: 46.2 % (ref 36.5–49.3)
HDLC SERPL-MCNC: 40 MG/DL
HGB BLD-MCNC: 15.3 G/DL (ref 12–17)
IMM GRANULOCYTES # BLD AUTO: 0.02 THOUSAND/UL (ref 0–0.2)
IMM GRANULOCYTES NFR BLD AUTO: 0 % (ref 0–2)
LDLC SERPL CALC-MCNC: 98 MG/DL (ref 0–100)
LYMPHOCYTES # BLD AUTO: 1.65 THOUSANDS/ΜL (ref 0.6–4.47)
LYMPHOCYTES NFR BLD AUTO: 25 % (ref 14–44)
MCH RBC QN AUTO: 31.7 PG (ref 26.8–34.3)
MCHC RBC AUTO-ENTMCNC: 33.1 G/DL (ref 31.4–37.4)
MCV RBC AUTO: 96 FL (ref 82–98)
MONOCYTES # BLD AUTO: 0.44 THOUSAND/ΜL (ref 0.17–1.22)
MONOCYTES NFR BLD AUTO: 7 % (ref 4–12)
NEUTROPHILS # BLD AUTO: 4.19 THOUSANDS/ΜL (ref 1.85–7.62)
NEUTS SEG NFR BLD AUTO: 64 % (ref 43–75)
NONHDLC SERPL-MCNC: 146 MG/DL
NRBC BLD AUTO-RTO: 0 /100 WBCS
PLATELET # BLD AUTO: 233 THOUSANDS/UL (ref 149–390)
PMV BLD AUTO: 10.5 FL (ref 8.9–12.7)
POTASSIUM SERPL-SCNC: 4.4 MMOL/L (ref 3.5–5.3)
PROT SERPL-MCNC: 7.4 G/DL (ref 6.4–8.4)
RBC # BLD AUTO: 4.83 MILLION/UL (ref 3.88–5.62)
SODIUM SERPL-SCNC: 138 MMOL/L (ref 135–147)
TRIGL SERPL-MCNC: 242 MG/DL
TSH SERPL DL<=0.05 MIU/L-ACNC: 2.95 UIU/ML (ref 0.45–4.5)
VIT B12 SERPL-MCNC: 335 PG/ML (ref 180–914)
WBC # BLD AUTO: 6.52 THOUSAND/UL (ref 4.31–10.16)

## 2024-10-08 PROCEDURE — 84443 ASSAY THYROID STIM HORMONE: CPT

## 2024-10-08 PROCEDURE — 82607 VITAMIN B-12: CPT

## 2024-10-08 PROCEDURE — 36415 COLL VENOUS BLD VENIPUNCTURE: CPT

## 2024-10-08 PROCEDURE — 80061 LIPID PANEL: CPT

## 2024-10-08 PROCEDURE — 85025 COMPLETE CBC W/AUTO DIFF WBC: CPT

## 2024-10-08 PROCEDURE — 83036 HEMOGLOBIN GLYCOSYLATED A1C: CPT

## 2024-10-08 PROCEDURE — 80053 COMPREHEN METABOLIC PANEL: CPT

## 2024-10-14 ENCOUNTER — TELEPHONE (OUTPATIENT)
Dept: ADMINISTRATIVE | Facility: OTHER | Age: 79
End: 2024-10-14

## 2024-10-14 NOTE — TELEPHONE ENCOUNTER
10/14/24 3:37 PM    Patient contacted to bring Advance Directive, POLST, or Living Will document to next scheduled pcp visit.[facility] Department spoke with patient/ caregiver.    Thank you.  Mari Kraft MA  [facility]

## 2024-10-15 ENCOUNTER — OFFICE VISIT (OUTPATIENT)
Dept: INTERNAL MEDICINE CLINIC | Facility: CLINIC | Age: 79
End: 2024-10-15
Payer: MEDICARE

## 2024-10-15 VITALS
HEIGHT: 66 IN | RESPIRATION RATE: 16 BRPM | WEIGHT: 227 LBS | BODY MASS INDEX: 36.48 KG/M2 | SYSTOLIC BLOOD PRESSURE: 124 MMHG | TEMPERATURE: 98 F | HEART RATE: 67 BPM | DIASTOLIC BLOOD PRESSURE: 78 MMHG | OXYGEN SATURATION: 98 %

## 2024-10-15 DIAGNOSIS — K21.9 LARYNGOPHARYNGEAL REFLUX (LPR): ICD-10-CM

## 2024-10-15 DIAGNOSIS — E66.812 CLASS 2 SEVERE OBESITY DUE TO EXCESS CALORIES WITH SERIOUS COMORBIDITY AND BODY MASS INDEX (BMI) OF 37.0 TO 37.9 IN ADULT (HCC): ICD-10-CM

## 2024-10-15 DIAGNOSIS — E66.01 CLASS 2 SEVERE OBESITY DUE TO EXCESS CALORIES WITH SERIOUS COMORBIDITY AND BODY MASS INDEX (BMI) OF 37.0 TO 37.9 IN ADULT (HCC): ICD-10-CM

## 2024-10-15 DIAGNOSIS — N18.2 CHRONIC KIDNEY DISEASE (CKD), STAGE II (MILD): ICD-10-CM

## 2024-10-15 DIAGNOSIS — E78.2 MIXED HYPERLIPIDEMIA: ICD-10-CM

## 2024-10-15 DIAGNOSIS — E53.8 VITAMIN B12 DEFICIENCY: ICD-10-CM

## 2024-10-15 DIAGNOSIS — I10 ESSENTIAL HYPERTENSION: Primary | ICD-10-CM

## 2024-10-15 DIAGNOSIS — R73.03 PREDIABETES: ICD-10-CM

## 2024-10-15 DIAGNOSIS — G24.5 EYE TWITCH: ICD-10-CM

## 2024-10-15 DIAGNOSIS — H81.319 AUDITORY VERTIGO, UNSPECIFIED LATERALITY: ICD-10-CM

## 2024-10-15 PROCEDURE — 99214 OFFICE O/P EST MOD 30 MIN: CPT | Performed by: INTERNAL MEDICINE

## 2024-10-15 PROCEDURE — G2211 COMPLEX E/M VISIT ADD ON: HCPCS | Performed by: INTERNAL MEDICINE

## 2024-10-15 NOTE — ASSESSMENT & PLAN NOTE
Lab Results   Component Value Date    EGFR 73 10/08/2024    EGFR 69 03/25/2024    EGFR 70 10/13/2023    CREATININE 0.98 10/08/2024    CREATININE 1.02 03/25/2024    CREATININE 1.01 10/13/2023     Stable.

## 2024-10-15 NOTE — PROGRESS NOTES
Ambulatory Visit  Name: Franky Hollis      : 1945      MRN: [id number]  Encounter Provider: Ayla Mckee MD  Encounter Date: 10/15/2024   Encounter department: [facility] INTERNAL MEDICINE    Assessment & Plan  Essential hypertension  BP controlled, on diuretics.         Prediabetes  A1c stable at 6.1%.    Orders:    Hemoglobin A1C; Future    Mixed hyperlipidemia  LDL improved, TG remains elevated.  On pravastatin 2 mg.    Orders:    Comprehensive metabolic panel; Future    Lipid panel; Future    Laryngopharyngeal reflux (LPR)  Taking PPI bid.         Chronic kidney disease (CKD), stage II (mild)  Lab Results   Component Value Date    EGFR 73 10/08/2024    EGFR 69 2024    EGFR 70 10/13/2023    CREATININE 0.98 10/08/2024    CREATININE 1.02 2024    CREATININE 1.01 10/13/2023     Stable.         Auditory vertigo, unspecified laterality  On diuretics.         Eye twitch  Ongoing, has received Botox x 3. Follow up with [facility].         Class 2 severe obesity due to excess calories with serious comorbidity and body mass index (BMI) of 37.0 to 37.9 in adult (HCC)  Stable weight. Recommend regular exercise, stretching.           Vitamin B12 deficiency  Taking B12.          Follow up in 6 months or as needed.    History of Present Illness     He had received his third Botox injection with a spasm of his right eye.  Unlike before which lasted for a few months, he felt that it did not really work.  He does have a follow-up appointment with them.  It is mostly annoying, he reports frequent tearing of the eye when he twitches frequently.  He had changes glasses, had issues with it as well.    He other wise feels well.  He reports no recent left shoulder pain.  He has not played volleyball again.  He feels he lacks the exercise, does remain active at home.    He denies any chest pain, shortness of breath, GI symptoms.  No constipation.  He urinates once at night, able to return to sleep  "with no issues.         Review of Systems   Constitutional:  Negative for appetite change and fatigue.   HENT:  Negative for congestion, hearing loss and postnasal drip.    Eyes: Negative.  Negative for pain, itching and visual disturbance.   Respiratory:  Negative for cough, chest tightness and shortness of breath.    Cardiovascular:  Negative for chest pain, palpitations and leg swelling.   Gastrointestinal:  Negative for abdominal pain and constipation.   Genitourinary:  Negative for dysuria, frequency and urgency.   Musculoskeletal:  Negative for arthralgias.   Skin:  Negative for rash and wound.   Neurological:  Negative for dizziness, numbness and headaches.   Hematological:  Negative for adenopathy. Does not bruise/bleed easily.   Psychiatric/Behavioral:  Negative for sleep disturbance. The patient is not nervous/anxious.            Objective     /78 (BP Location: Left arm, Patient Position: Sitting, Cuff Size: Large)   Pulse 67   Temp 98 °F (36.7 °C) (Temporal)   Resp 16   Ht 5' 6\" (1.676 m)   Wt 103 kg (227 lb)   SpO2 98%   BMI 36.64 kg/m²     Physical Exam  Vitals and nursing note reviewed.   Constitutional:       General: He is not in acute distress.     Appearance: He is well-developed.   HENT:      Head: Normocephalic and atraumatic.        Mouth/Throat:      Mouth: Mucous membranes are moist.   Eyes:      Conjunctiva/sclera: Conjunctivae normal.   Cardiovascular:      Rate and Rhythm: Normal rate and regular rhythm.      Heart sounds: No murmur heard.  Pulmonary:      Effort: Pulmonary effort is normal. No respiratory distress.      Breath sounds: Normal breath sounds.   Abdominal:      Palpations: Abdomen is soft.      Tenderness: There is no abdominal tenderness.   Musculoskeletal:         General: No swelling.      Cervical back: Neck supple.   Skin:     General: Skin is warm and dry.   Neurological:      General: No focal deficit present.      Mental Status: He is alert and oriented to " person, place, and time.   Psychiatric:         Mood and Affect: Mood normal.         Behavior: Behavior normal.           Labs & imaging results reviewed with patient.

## 2024-10-15 NOTE — ASSESSMENT & PLAN NOTE
LDL improved, TG remains elevated.  On pravastatin 2 mg.    Orders:    Comprehensive metabolic panel; Future    Lipid panel; Future

## 2025-01-11 DIAGNOSIS — E78.2 MIXED HYPERLIPIDEMIA: ICD-10-CM

## 2025-01-11 DIAGNOSIS — H81.319 AUDITORY VERTIGO, UNSPECIFIED LATERALITY: ICD-10-CM

## 2025-01-13 RX ORDER — TRIAMTERENE AND HYDROCHLOROTHIAZIDE 37.5; 25 MG/1; MG/1
1 CAPSULE ORAL DAILY
Qty: 90 CAPSULE | Refills: 1 | Status: SHIPPED | OUTPATIENT
Start: 2025-01-13

## 2025-01-13 RX ORDER — PRAVASTATIN SODIUM 20 MG
20 TABLET ORAL DAILY
Qty: 90 TABLET | Refills: 1 | Status: SHIPPED | OUTPATIENT
Start: 2025-01-13

## 2025-02-19 ENCOUNTER — OFFICE VISIT (OUTPATIENT)
Dept: INTERNAL MEDICINE CLINIC | Facility: CLINIC | Age: 80
End: 2025-02-19
Payer: MEDICARE

## 2025-02-19 VITALS
DIASTOLIC BLOOD PRESSURE: 90 MMHG | HEART RATE: 68 BPM | BODY MASS INDEX: 36.13 KG/M2 | WEIGHT: 224.8 LBS | TEMPERATURE: 96.8 F | SYSTOLIC BLOOD PRESSURE: 148 MMHG | OXYGEN SATURATION: 98 % | HEIGHT: 66 IN

## 2025-02-19 DIAGNOSIS — H81.319 AUDITORY VERTIGO, UNSPECIFIED LATERALITY: Primary | ICD-10-CM

## 2025-02-19 DIAGNOSIS — I10 ESSENTIAL HYPERTENSION: ICD-10-CM

## 2025-02-19 DIAGNOSIS — Z23 NEED FOR INFLUENZA VACCINATION: ICD-10-CM

## 2025-02-19 PROCEDURE — G0008 ADMIN INFLUENZA VIRUS VAC: HCPCS

## 2025-02-19 PROCEDURE — G2211 COMPLEX E/M VISIT ADD ON: HCPCS | Performed by: INTERNAL MEDICINE

## 2025-02-19 PROCEDURE — 99213 OFFICE O/P EST LOW 20 MIN: CPT | Performed by: INTERNAL MEDICINE

## 2025-02-19 PROCEDURE — 90662 IIV NO PRSV INCREASED AG IM: CPT

## 2025-02-19 RX ORDER — VALSARTAN 40 MG/1
40 TABLET ORAL DAILY
Qty: 30 TABLET | Refills: 1 | Status: SHIPPED | OUTPATIENT
Start: 2025-02-19

## 2025-02-19 NOTE — PROGRESS NOTES
"Name: Franky Hollis      : 1945      MRN: [id number]  Encounter Provider: Ayla Mckee MD  Encounter Date: 2025   Encounter department: [facility] INTERNAL MEDICINE  :  Assessment & Plan  Auditory vertigo, unspecified laterality  Continue diuretics.  Avoid rapid head movements. Start home exercises for BPPV.  Discussed vestibular therapy if no improvement.       Essential hypertension  Home BP readings also high. Start valsartan 40 mg daily.  Orders:  •  valsartan (DIOVAN) 40 mg tablet; Take 1 tablet (40 mg total) by mouth daily    Need for influenza vaccination    Orders:  •  influenza vaccine, high-dose, PF 0.5 mL (Fluzone High Dose)    Follow up as scheduled or as needed.         History of Present Illness   He reports feeling lightheaded intermittently for the past month.  He notices symptoms occurring more often.  It usually occurs when he is at rest, sometimes when he is doing something.  He reports a spinning sensation.  Denies any nausea or vomiting, no headaches.  Denies any chest pain, shortness of breath or other symptoms.  It does not occur daily.  Last episode occurred yesterday morning while he was eating breakfast.    He has been checking his blood pressure at home.  Blood pressure readings have been running high, systolic 1 40-1 50s, diastolic 80 to 90s.  He recalls taking diuretics for his vertigo several years ago which helped right away.      Review of Systems   Constitutional:  Negative for activity change and appetite change.   Respiratory:  Negative for shortness of breath.    Cardiovascular:  Negative for chest pain.   Gastrointestinal:  Negative for nausea and vomiting.   Neurological:  Positive for dizziness and light-headedness. Negative for syncope and headaches.       Objective   /90 (Patient Position: Standing)   Pulse 68   Temp (!) 96.8 °F (36 °C)   Ht 5' 6\" (1.676 m)   Wt 102 kg (224 lb 12.8 oz)   SpO2 98%   BMI 36.28 kg/m²      Physical " Exam  Constitutional:       General: He is not in acute distress.     Appearance: Normal appearance. He is not ill-appearing.   HENT:      Head: Normocephalic and atraumatic.      Right Ear: Tympanic membrane, ear canal and external ear normal.      Left Ear: Tympanic membrane, ear canal and external ear normal.   Cardiovascular:      Rate and Rhythm: Normal rate and regular rhythm.   Pulmonary:      Effort: Pulmonary effort is normal. No respiratory distress.      Breath sounds: Normal breath sounds.   Neurological:      General: No focal deficit present.      Mental Status: He is alert and oriented to person, place, and time.      Comments: Mild horizontal nystagmus Rt>L   Psychiatric:         Mood and Affect: Mood normal.         Behavior: Behavior normal.

## 2025-02-19 NOTE — ASSESSMENT & PLAN NOTE
Continue diuretics.  Avoid rapid head movements. Start home exercises for BPPV.  Discussed vestibular therapy if no improvement.

## 2025-02-19 NOTE — ASSESSMENT & PLAN NOTE
Home BP readings also high. Start valsartan 40 mg daily.  Orders:  •  valsartan (DIOVAN) 40 mg tablet; Take 1 tablet (40 mg total) by mouth daily

## 2025-02-28 ENCOUNTER — TELEPHONE (OUTPATIENT)
Age: 80
End: 2025-02-28

## 2025-02-28 DIAGNOSIS — H81.13 BENIGN PAROXYSMAL POSITIONAL VERTIGO DUE TO BILATERAL VESTIBULAR DISORDER: Primary | ICD-10-CM

## 2025-02-28 NOTE — TELEPHONE ENCOUNTER
Pt called to request to have Dr Mckee place an order for PT for vertigo.  He doesn't feel the exercises are helping.  Please call him to let him know when it is in the system.  Thank you!

## 2025-03-05 ENCOUNTER — TELEPHONE (OUTPATIENT)
Dept: INTERNAL MEDICINE CLINIC | Facility: CLINIC | Age: 80
End: 2025-03-05

## 2025-03-05 NOTE — TELEPHONE ENCOUNTER
If BP readings still in the 150's, please increase valsartan to 2 tablets daily.  Please call in 2 weeks for BP readings.

## 2025-03-05 NOTE — TELEPHONE ENCOUNTER
Patient said there has been no change since starting the medication. He couldn't tell me the numbers because he cannot find the paper since they have been redecorating.

## 2025-03-09 DIAGNOSIS — K21.9 LARYNGOPHARYNGEAL REFLUX (LPR): ICD-10-CM

## 2025-03-09 DIAGNOSIS — I10 ESSENTIAL HYPERTENSION: ICD-10-CM

## 2025-03-09 DIAGNOSIS — K21.9 GASTROESOPHAGEAL REFLUX DISEASE WITHOUT ESOPHAGITIS: ICD-10-CM

## 2025-03-10 RX ORDER — OMEPRAZOLE 40 MG/1
40 CAPSULE, DELAYED RELEASE ORAL 2 TIMES DAILY
Qty: 180 CAPSULE | Refills: 1 | Status: SHIPPED | OUTPATIENT
Start: 2025-03-10

## 2025-03-11 ENCOUNTER — EVALUATION (OUTPATIENT)
Dept: PHYSICAL THERAPY | Facility: CLINIC | Age: 80
End: 2025-03-11
Payer: MEDICARE

## 2025-03-11 DIAGNOSIS — H81.13 BENIGN PAROXYSMAL POSITIONAL VERTIGO DUE TO BILATERAL VESTIBULAR DISORDER: ICD-10-CM

## 2025-03-11 PROCEDURE — 97162 PT EVAL MOD COMPLEX 30 MIN: CPT | Performed by: PHYSICAL THERAPIST

## 2025-03-11 PROCEDURE — 97112 NEUROMUSCULAR REEDUCATION: CPT | Performed by: PHYSICAL THERAPIST

## 2025-03-11 NOTE — PROGRESS NOTES
"PT Evaluation     Today's date: 3/11/2025  Patient name: Franky Hollis  : 1945  MRN: [id number]  Referring provider: Ayla Mckee MD  Dx:   Encounter Diagnosis     ICD-10-CM    1. Benign paroxysmal positional vertigo due to bilateral vestibular disorder  H81.13 Ambulatory Referral to Physical Therapy                     Assessment  Impairments: abnormal muscle firing, abnormal or restricted ROM, abnormal movement, activity intolerance, impaired balance, lacks appropriate home exercise program, safety issue, poor posture  and poor body mechanics    Assessment details: Pt presents with unremarkable vestibular positional testing and a provisional classification of cervicogenic dizziness. He will benefit from skilled PT services to address his impairments in order to decrease pain and restore function.  Understanding of Dx/Px/POC: good     Prognosis: fair    Goals  STG - 2-4 wks  1) pt will improve c/s ROM 50%  2) pt will improve lightheadedness 50%    LTG - 4-8 wks  1) pt will normalize c/s ROM  2) pt will resolve lightheadedness at baseline    Plan  Patient would benefit from: skilled physical therapy    Planned therapy interventions: joint mobilization, manual therapy, neuromuscular re-education, postural training, self care, strengthening, stretching, therapeutic activities, therapeutic exercise, flexibility, functional ROM exercises, graded activity, graded exercise, balance and body mechanics training    Frequency: 2x week  Duration in weeks: 8  Treatment plan discussed with: patient        Subjective Evaluation    History of Present Illness  Mechanism of injury: Pt is a 80 y.o. male with PMHx significant for HTN, CKD. He reports insidious onset of constant lightheadedness and feelings of swaying at baseline with 2 episodes of vertigo lasting 10 minutes each starting 2 mos ago. He denies red flags, h/a, visual changes, neck pain. He reports feeling R ear is \"clogged\" over the past few weeks. " He denies worsening symptoms with turning over in bed, head movements, or STS.  Patient Goals  Patient goals for therapy: improved balance  Patient goal: resolve lightheadness  Pain  No pain reported      Diagnostic Tests  No diagnostic tests performed  Treatments  Treatments tried: home epley maneuver - no change.        Objective     Concurrent Complaints  Positive for aural fullness (R). Negative for headaches, tinnitus and visual change    Active Range of Motion   Cervical/Thoracic Spine       Cervical  Subcranial protraction:   Restriction level: minimal  Subcranial retraction:   Restriction level: moderate  Flexion:  Restriction level: minimal  Extension:  Restriction level: moderate  Left lateral flexion:  Restriction level: maximal  Right lateral flexion:  Restriction level maximal  Left rotation:  Restriction level: moderate  Right rotation:  Restriction level: moderate  Mechanical Assessment    Cervical    Seated Protrusion: repeated movements   I,W (lightheadedness)  Seated retraction: repeated movements   I,W (lightheadedness)  Seated Flexion:  repeated movements  NE,NE  Supine retractation: repeated movements  supine chin tuck NE,NE (improved subjective neck stiffness)    Thoracic      Lumbar      Tests   Cervical   Negative Sharp-Colby test and VBI.   Neuro Exam:     Dizziness  Positive for light-headedness and rocking or swaying.   Negative for disequilibrium, vertigo (2 episodes lasting 10 min ea over the past 3 wks), oscillopsia, motion sickness, diplopia and floating or swimming.     Exacerbating factors  Positive for supine to/from sitting (supine to sit only).   Negative for bending over, rolling in bed, looking up, walking, turning head, optokinetic movement and walking in busy environment.     Headaches   Patient reports headaches: No.     Positional testing   [place]-Hallpike   Left posterior canal: WNL  Right posterior canal: WNL  Roll test   Left horizontal canal: WNL  Right horizontal canal:  WNL             Precautions: PMHx: HTN, CKD II  Dx: provisional classification of cervicogenic dizziness    Manuals 3/11            Rep RET clin OP                                                    Neuro Re-Ed             Postural correction and awareness training 3 min            Rep RET seated 1x10            Rep RET pt OP seated             Rep upper c/s flexion supine 3x10            Rep RET, B rotation seated             Rep T ext over chair maintaining upper c/s flexion                          Ther Ex                                                                                                                     Ther Activity                                       Gait Training                                       Modalities

## 2025-03-13 ENCOUNTER — OFFICE VISIT (OUTPATIENT)
Dept: PHYSICAL THERAPY | Facility: CLINIC | Age: 80
End: 2025-03-13
Payer: MEDICARE

## 2025-03-13 DIAGNOSIS — H81.13 BENIGN PAROXYSMAL POSITIONAL VERTIGO DUE TO BILATERAL VESTIBULAR DISORDER: Primary | ICD-10-CM

## 2025-03-13 PROCEDURE — 97112 NEUROMUSCULAR REEDUCATION: CPT | Performed by: PHYSICAL THERAPIST

## 2025-03-13 PROCEDURE — 97140 MANUAL THERAPY 1/> REGIONS: CPT | Performed by: PHYSICAL THERAPIST

## 2025-03-13 NOTE — PROGRESS NOTES
Daily Note     Today's date: 3/13/2025  Patient name: Franky Hollis  : 1945  MRN: [id number]  Referring provider: Ayla Mckee MD  Dx:   Encounter Diagnosis     ICD-10-CM    1. Benign paroxysmal positional vertigo due to bilateral vestibular disorder  H81.13                      Subjective: Pt reports good compliance with HEP, no increased lightheadedness from baseline since IE.    Objective: See treatment diary below    Assessment: Pt demonstrated P,NW with Rep RET pt OP which improved to NE,NE following T1-4 PA mobs.    Plan: Assess response nv.     Precautions: PMHx: HTN, CKD II  Dx: provisional classification of cervicogenic dizziness    Manuals 3/11 3/13           Rep RET clin OP             Gr IV T1-4 PA mobs  2x30 ea                                     Neuro Re-Ed             Postural correction and awareness training 3 min 3 min           Rep RET seated 1x10 1x10           Rep RET pt OP seated  6x10           Rep upper c/s flexion supine 3x10            Rep RET, B rotation seated             Rep T ext over chair maintaining upper c/s flexion  1x20                        Ther Ex                                                                                                                     Ther Activity                                       Gait Training                                       Modalities

## 2025-03-14 RX ORDER — VALSARTAN 40 MG/1
40 TABLET ORAL 2 TIMES DAILY
Qty: 180 TABLET | Refills: 0 | Status: SHIPPED | OUTPATIENT
Start: 2025-03-14

## 2025-03-18 ENCOUNTER — OFFICE VISIT (OUTPATIENT)
Dept: PHYSICAL THERAPY | Facility: CLINIC | Age: 80
End: 2025-03-18
Payer: MEDICARE

## 2025-03-18 DIAGNOSIS — H81.13 BENIGN PAROXYSMAL POSITIONAL VERTIGO DUE TO BILATERAL VESTIBULAR DISORDER: Primary | ICD-10-CM

## 2025-03-18 PROCEDURE — 97112 NEUROMUSCULAR REEDUCATION: CPT | Performed by: PHYSICAL THERAPIST

## 2025-03-18 PROCEDURE — 97140 MANUAL THERAPY 1/> REGIONS: CPT | Performed by: PHYSICAL THERAPIST

## 2025-03-18 NOTE — PROGRESS NOTES
Daily Note     Today's date: 3/18/2025  Patient name: Franky Hollis  : 1945  MRN: [id number]  Referring provider: Ayla Mckee MD  Dx:   Encounter Diagnosis     ICD-10-CM    1. Benign paroxysmal positional vertigo due to bilateral vestibular disorder  H81.13                      Subjective: Pt reports 1 episode of increased lightheadness from baseline when using his phone over the weekend.    Objective: See treatment diary below    HEP Rep RET pt OP 1x20, Rep T ext 1x20, Rep B rot 1x10 ea 5xD    Assessment: Pt demonstrated P,NW with all upper cervical ROM however with improved ROM globally. Pt demonstrated confirmation of cervicogenic dizziness classification however without clear indication of presence of DP.    Plan: Assess response nv.     Precautions: PMHx: HTN, CKD II  Dx: provisional classification of cervicogenic dizziness    Manuals 3/11 3/13 3/18          Rep RET clin OP   1x10          C2-3 ext MWM   1x3 ea          Rep L rot clin OP   1x10          Gr IV T1-4 PA mobs  2x30 ea                                     Neuro Re-Ed             Postural correction and awareness training 3 min 3 min 5 min          Rep RET seated 1x10 1x10           Rep RET pt OP seated  6x10 3x10          Rep RET pt OP, E seated   3x10          Rep upper c/s flexion supine 3x10            Rep RET, B rotation seated   1x10          Rep T ext over chair maintaining upper c/s flexion  1x20 3x10                       Ther Ex                                                                                                                     Ther Activity                                       Gait Training                                       Modalities

## 2025-03-20 ENCOUNTER — OFFICE VISIT (OUTPATIENT)
Dept: PHYSICAL THERAPY | Facility: CLINIC | Age: 80
End: 2025-03-20
Payer: MEDICARE

## 2025-03-20 DIAGNOSIS — H81.13 BENIGN PAROXYSMAL POSITIONAL VERTIGO DUE TO BILATERAL VESTIBULAR DISORDER: Primary | ICD-10-CM

## 2025-03-20 PROCEDURE — 97112 NEUROMUSCULAR REEDUCATION: CPT | Performed by: PHYSICAL THERAPIST

## 2025-03-20 PROCEDURE — 97140 MANUAL THERAPY 1/> REGIONS: CPT | Performed by: PHYSICAL THERAPIST

## 2025-03-20 NOTE — PROGRESS NOTES
Daily Note     Today's date: 3/20/2025  Patient name: Franky Hollis  : 1945  MRN: [id number]  Referring provider: Ayla Mckee MD  Dx:   Encounter Diagnosis     ICD-10-CM    1. Benign paroxysmal positional vertigo due to bilateral vestibular disorder  H81.13                      Subjective: Pt reports no improvement with cervical ROM, c/o episode of vertigo last night when turning quickly to the R.    Objective: See treatment diary below    (+) L roll test  (-) R roll test, B francoise-hallpike    Assessment: Pt presents with s/s consistent with L horizontal canal BPPV with resolved vertigo and improved dizziness with repetition of R BBQ maneuver.    Plan: Assess response nv.     Precautions: PMHx: HTN, CKD II  Dx: provisional classification of cervicogenic dizziness    Manuals 3/11 3/13 3/18 3/20         Rep RET clin OP   1x10          C2-3 ext MWM   1x3 ea          Rep L rot clin OP   1x10          Gr IV T1-4 PA mobs  2x30 ea           R francoise-hallpike    x1         L francoise-hallpike    x1         R roll test    x1         L roll tests    x1                      Neuro Re-Ed             Postural correction and awareness training 3 min 3 min 5 min          Rep RET seated 1x10 1x10           Rep RET pt OP seated  6x10 3x10 HEP         Rep RET pt OP, E seated   3x10          Rep upper c/s flexion supine 3x10            Rep RET, B rotation seated   1x10          Rep T ext over chair maintaining upper c/s flexion  1x20 3x10          R BBQ maneuver for horizontal canal    3 cycles                                   Ther Ex                                                                                                                     Ther Activity                                       Gait Training                                       Modalities

## 2025-03-25 ENCOUNTER — OFFICE VISIT (OUTPATIENT)
Dept: PHYSICAL THERAPY | Facility: CLINIC | Age: 80
End: 2025-03-25
Payer: MEDICARE

## 2025-03-25 DIAGNOSIS — H81.13 BENIGN PAROXYSMAL POSITIONAL VERTIGO DUE TO BILATERAL VESTIBULAR DISORDER: Primary | ICD-10-CM

## 2025-03-25 PROCEDURE — 97112 NEUROMUSCULAR REEDUCATION: CPT | Performed by: PHYSICAL THERAPIST

## 2025-03-25 PROCEDURE — 97140 MANUAL THERAPY 1/> REGIONS: CPT | Performed by: PHYSICAL THERAPIST

## 2025-03-25 NOTE — PROGRESS NOTES
Daily Note     Today's date: 3/25/2025  Patient name: Franky Hollis  : 1945  MRN: [id number]  Referring provider: Ayla Mckee MD  Dx:   Encounter Diagnosis     ICD-10-CM    1. Benign paroxysmal positional vertigo due to bilateral vestibular disorder  H81.13                      Subjective: Pt reports no further improvement in baseline dizziness since last visit despite R BBQ 2xD every day. He reports baseline lightheadedness and R ear fullness.    Objective: See treatment diary below    (-) L roll test  R BBQ maneuver NE,NE  Rep TMJ protrusion clin OP D,B (R ear) and P,NW (lightheadedness)    Assessment: Pt demonstrates no further change with R BBQ maneuver, now possible R TMJ derangement.    Plan: Assess response nv.     Precautions: PMHx: HTN, CKD II  Dx: provisional classification of cervicogenic dizziness    Manuals 3/11 3/13 3/18 3/20 3/25        Rep RET clin OP   1x10          C2-3 ext MWM   1x3 ea          Rep L rot clin OP   1x10          Gr IV T1-4 PA mobs  2x30 ea           R francoise-hallpike    x1         L francoise-hallpike    x1         R roll test    x1         L roll tests    x1         B TMJ protrusion clin OP     2x10                     Neuro Re-Ed             Postural correction and awareness training 3 min 3 min 5 min  5 min        Rep RET seated 1x10 1x10           Rep RET pt OP seated  6x10 3x10 HEP 3x10        Rep RET pt OP, E seated   3x10          Rep upper c/s flexion supine 3x10            Rep RET, B rotation seated   1x10          Rep T ext over chair maintaining upper c/s flexion  1x20 3x10          R BBQ maneuver for horizontal canal    3 cycles 2 cycles        Rep TMJ protrusion     3x20    OP  1x10        Rep TMJ R lateral glide     1x20    OP  1x20        Rep TMJ L lateral glide     1x20    OP  1x20                                  Ther Ex                                                                                                                     Ther Activity                                        Gait Training                                       Modalities

## 2025-03-27 ENCOUNTER — OFFICE VISIT (OUTPATIENT)
Dept: PHYSICAL THERAPY | Facility: CLINIC | Age: 80
End: 2025-03-27
Payer: MEDICARE

## 2025-03-27 DIAGNOSIS — H81.13 BENIGN PAROXYSMAL POSITIONAL VERTIGO DUE TO BILATERAL VESTIBULAR DISORDER: Primary | ICD-10-CM

## 2025-03-27 PROCEDURE — 97112 NEUROMUSCULAR REEDUCATION: CPT | Performed by: PHYSICAL THERAPIST

## 2025-04-01 ENCOUNTER — OFFICE VISIT (OUTPATIENT)
Dept: PHYSICAL THERAPY | Facility: CLINIC | Age: 80
End: 2025-04-01
Payer: MEDICARE

## 2025-04-01 DIAGNOSIS — H81.13 BENIGN PAROXYSMAL POSITIONAL VERTIGO DUE TO BILATERAL VESTIBULAR DISORDER: Primary | ICD-10-CM

## 2025-04-01 PROCEDURE — 97140 MANUAL THERAPY 1/> REGIONS: CPT | Performed by: PHYSICAL THERAPIST

## 2025-04-01 PROCEDURE — 97112 NEUROMUSCULAR REEDUCATION: CPT | Performed by: PHYSICAL THERAPIST

## 2025-04-01 NOTE — PROGRESS NOTES
Daily Note     Today's date: 2025  Patient name: Franky Hollis  : 1945  MRN: [id number]  Referring provider: Ayla Mckee MD  Dx:   Encounter Diagnosis     ICD-10-CM    1. Benign paroxysmal positional vertigo due to bilateral vestibular disorder  H81.13                      Subjective: Pt reports a return to baseline lightheadedness and no ability to change with HEP. He will f/u with ENT next week.    Objective: See treatment diary below    HEP Rep RET pt OP 1x20 q 1 hr    Assessment: Pt demonstrated NE,NE with TMJ repeated motions and improved ROM but otherwise NE,NE with c/s.    Plan: Assess response nv. Trial h/a mobs if no change.     Precautions: PMHx: HTN, CKD II  Dx: provisional classification of cervicogenic dizziness    Manuals 3/11 3/13 3/18 3/20 3/25 3/27 4/1      Rep RET clin OP   1x10    3x10      Rep RET mobs       5x10      C2-3 ext MWM   1x3 ea          Rep L rot clin OP   1x10          Gr IV T1-4 PA mobs  2x30 ea           R francoise-hallpike    x1         L francoise-hallpike    x1         R roll test    x1         L roll tests    x1         B TMJ protrusion clin OP     2x10 2x10                    Neuro Re-Ed             Postural correction and awareness training 3 min 3 min 5 min  5 min        Rep RET seated 1x10 1x10    3x10 3x10      Rep RET pt OP seated  6x10 3x10 HEP 3x10 3x10 3x10      Rep RET pt OP, E seated   3x10          Rep upper c/s flexion supine 3x10     3x10       Rep RET, B rotation seated   1x10          Rep T ext over chair maintaining upper c/s flexion  1x20 3x10          R BBQ maneuver for horizontal canal    3 cycles 2 cycles        Rep TMJ protrusion     3x20    OP  1x10 3x20 1x30    + R glide  1x30      Rep TMJ R lateral glide     1x20    OP  1x20  1x20    OP  1x20      Rep TMJ L lateral glide     1x20    OP  1x20  1x20    OP  1x20                                Ther Ex                                                                                                                      Ther Activity                                       Gait Training                                       Modalities

## 2025-04-03 ENCOUNTER — OFFICE VISIT (OUTPATIENT)
Dept: PHYSICAL THERAPY | Facility: CLINIC | Age: 80
End: 2025-04-03
Payer: MEDICARE

## 2025-04-03 DIAGNOSIS — H81.13 BENIGN PAROXYSMAL POSITIONAL VERTIGO DUE TO BILATERAL VESTIBULAR DISORDER: Primary | ICD-10-CM

## 2025-04-03 PROCEDURE — 97112 NEUROMUSCULAR REEDUCATION: CPT | Performed by: PHYSICAL THERAPIST

## 2025-04-03 NOTE — PROGRESS NOTES
"Daily Note     Today's date: 4/3/2025  Patient name: Franky Hollis  : 1945  MRN: [id number]  Referring provider: Ayla Mckee MD  Dx:   Encounter Diagnosis     ICD-10-CM    1. Benign paroxysmal positional vertigo due to bilateral vestibular disorder  H81.13                      Subjective: Pt reports no change with updated HEP.    Objective: See treatment diary below    HEP Rep RET,flex pt OP supine 1x20 5xD    Assessment: Pt demonstrated A,NB with Rep RET, flex pt OP supine.    Plan: Assess response nv.     Precautions: PMHx: HTN, CKD II  Dx: provisional classification of cervicogenic dizziness    Manuals 3/11 3/13 3/18 3/20 3/25 3/27 4/1 4/3     Rep RET clin OP   1x10    3x10      Rep RET mobs       5x10      PA h/a mobs        C,U  30\"x1 ea     AP h/a mobs        C/  30\"x2     C2-3 ext MWM   1x3 ea          Rep L rot clin OP   1x10          Gr IV T1-4 PA mobs  2x30 ea           R francoise-hallpike    x1         L francoise-hallpike    x1         R roll test    x1         L roll tests    x1         B TMJ protrusion clin OP     2x10 2x10                    Neuro Re-Ed             Postural correction and awareness training 3 min 3 min 5 min  5 min   8 min     Rep RET seated 1x10 1x10    3x10 3x10      Rep RET pt OP seated  6x10 3x10 HEP 3x10 3x10 3x10 3x10     Rep RET pt OP, E seated   3x10          Rep upper c/s flexion supine 3x10     3x10       Rep RET, B rotation seated   1x10     3x10 ea     Rep RET, flex seated        1x10     Rep RET, flex pt OP seated        3x10     Rep RET, flex pt OP supine        3x10     Rep T ext over chair maintaining upper c/s flexion  1x20 3x10          R BBQ maneuver for horizontal canal    3 cycles 2 cycles        Rep TMJ protrusion     3x20    OP  1x10 3x20 1x30    + R glide  1x30      Rep TMJ R lateral glide     1x20    OP  1x20  1x20    OP  1x20      Rep TMJ L lateral glide     1x20    OP  1x20  1x20    OP  1x20                                Ther Ex                  "                                                                                                    Ther Activity                                       Gait Training                                       Modalities

## 2025-04-07 ENCOUNTER — APPOINTMENT (OUTPATIENT)
Dept: LAB | Facility: CLINIC | Age: 80
End: 2025-04-07
Payer: MEDICARE

## 2025-04-07 DIAGNOSIS — E78.2 MIXED HYPERLIPIDEMIA: ICD-10-CM

## 2025-04-07 DIAGNOSIS — R73.03 PREDIABETES: ICD-10-CM

## 2025-04-07 LAB
ALBUMIN SERPL BCG-MCNC: 4.3 G/DL (ref 3.5–5)
ALP SERPL-CCNC: 61 U/L (ref 34–104)
ALT SERPL W P-5'-P-CCNC: 10 U/L (ref 7–52)
ANION GAP SERPL CALCULATED.3IONS-SCNC: 6 MMOL/L (ref 4–13)
AST SERPL W P-5'-P-CCNC: 16 U/L (ref 13–39)
BILIRUB SERPL-MCNC: 0.52 MG/DL (ref 0.2–1)
BUN SERPL-MCNC: 19 MG/DL (ref 5–25)
CALCIUM SERPL-MCNC: 9 MG/DL (ref 8.4–10.2)
CHLORIDE SERPL-SCNC: 100 MMOL/L (ref 96–108)
CHOLEST SERPL-MCNC: 173 MG/DL (ref ?–200)
CO2 SERPL-SCNC: 33 MMOL/L (ref 21–32)
CREAT SERPL-MCNC: 1.01 MG/DL (ref 0.6–1.3)
GFR SERPL CREATININE-BSD FRML MDRD: 69 ML/MIN/1.73SQ M
GLUCOSE P FAST SERPL-MCNC: 99 MG/DL (ref 65–99)
HDLC SERPL-MCNC: 39 MG/DL
LDLC SERPL CALC-MCNC: 98 MG/DL (ref 0–100)
NONHDLC SERPL-MCNC: 134 MG/DL
POTASSIUM SERPL-SCNC: 4 MMOL/L (ref 3.5–5.3)
PROT SERPL-MCNC: 6.9 G/DL (ref 6.4–8.4)
SODIUM SERPL-SCNC: 139 MMOL/L (ref 135–147)
TRIGL SERPL-MCNC: 180 MG/DL (ref ?–150)

## 2025-04-07 PROCEDURE — 80061 LIPID PANEL: CPT

## 2025-04-07 PROCEDURE — 83036 HEMOGLOBIN GLYCOSYLATED A1C: CPT

## 2025-04-07 PROCEDURE — 36415 COLL VENOUS BLD VENIPUNCTURE: CPT

## 2025-04-07 PROCEDURE — 80053 COMPREHEN METABOLIC PANEL: CPT

## 2025-04-08 ENCOUNTER — OFFICE VISIT (OUTPATIENT)
Dept: PHYSICAL THERAPY | Facility: CLINIC | Age: 80
End: 2025-04-08
Payer: MEDICARE

## 2025-04-08 DIAGNOSIS — H81.13 BENIGN PAROXYSMAL POSITIONAL VERTIGO DUE TO BILATERAL VESTIBULAR DISORDER: Primary | ICD-10-CM

## 2025-04-08 LAB
EST. AVERAGE GLUCOSE BLD GHB EST-MCNC: 143 MG/DL
HBA1C MFR BLD: 6.6 %

## 2025-04-08 PROCEDURE — 97112 NEUROMUSCULAR REEDUCATION: CPT | Performed by: PHYSICAL THERAPIST

## 2025-04-08 NOTE — PROGRESS NOTES
"Daily Note     Today's date: 2025  Patient name: Franky Hollis  : 1945  MRN: [id number]  Referring provider: Ayla Mckee MD  Dx:   Encounter Diagnosis     ICD-10-CM    1. Benign paroxysmal positional vertigo due to bilateral vestibular disorder  H81.13                      Subjective: Pt reports no dizziness Saturday or  f/b return to baseline Monday. He reports not being able to perform his HEP as often on  while traveling.    Objective: See treatment diary below    HEP Rep RET,flex pt OP supine 1x20q 2 hrs, balance and DNF 1xD    Assessment: Pt demonstrated greatest postural sway with EC > head turns.    Plan: Cont POC.     Precautions: PMHx: HTN, CKD II  Dx: provisional classification of cervicogenic dizziness    Manuals 3/11 3/13 3/18 3/20 3/25 3/27 4/1 4/3 4/8    Rep RET clin OP   1x10    3x10      Rep RET mobs       5x10      Graston suboccipitals         5 min    PA h/a mobs        C,U  30\"x1 ea     AP h/a mobs        C/  30\"x2     C2-3 ext MWM   1x3 ea          Rep L rot clin OP   1x10          Gr IV T1-4 PA mobs  2x30 ea           R francoise-hallpike    x1         L francoise-hallpike    x1         R roll test    x1         L roll tests    x1         B TMJ protrusion clin OP     2x10 2x10                    Neuro Re-Ed             Postural correction and awareness training 3 min 3 min 5 min  5 min   8 min     Rep RET seated 1x10 1x10    3x10 3x10      Rep RET pt OP seated  6x10 3x10 HEP 3x10 3x10 3x10 3x10     Rep RET pt OP, E seated   3x10          Rep upper c/s flexion supine 3x10     3x10       Rep RET, B rotation seated   1x10     3x10 ea     Rep RET, flex seated        1x10     Rep RET, flex pt OP seated        3x10 HEP    Rep RET, flex pt OP supine        3x10 5x10    DNF         5\"x5    FT EC         Floor  30\"x2    FT EO head turns         Floor  30\"x2    SLS EO         Floor  30\"x2    Rep T ext over chair maintaining upper c/s flexion  1x20 3x10          R BBQ maneuver " for horizontal canal    3 cycles 2 cycles        Rep TMJ protrusion     3x20    OP  1x10 3x20 1x30    + R glide  1x30      Rep TMJ R lateral glide     1x20    OP  1x20  1x20    OP  1x20      Rep TMJ L lateral glide     1x20    OP  1x20  1x20    OP  1x20                                Ther Ex                                                                                                                     Ther Activity                                       Gait Training                                       Modalities

## 2025-04-10 ENCOUNTER — OFFICE VISIT (OUTPATIENT)
Dept: PHYSICAL THERAPY | Facility: CLINIC | Age: 80
End: 2025-04-10
Payer: MEDICARE

## 2025-04-10 DIAGNOSIS — H81.13 BENIGN PAROXYSMAL POSITIONAL VERTIGO DUE TO BILATERAL VESTIBULAR DISORDER: Primary | ICD-10-CM

## 2025-04-10 PROCEDURE — 97140 MANUAL THERAPY 1/> REGIONS: CPT | Performed by: PHYSICAL THERAPIST

## 2025-04-10 PROCEDURE — 97112 NEUROMUSCULAR REEDUCATION: CPT | Performed by: PHYSICAL THERAPIST

## 2025-04-10 NOTE — PROGRESS NOTES
"Daily Note     Today's date: 4/10/2025  Patient name: Franky Hollis  : 1945  MRN: [id number]  Referring provider: Ayla Mckee MD  Dx:   Encounter Diagnosis     ICD-10-CM    1. Benign paroxysmal positional vertigo due to bilateral vestibular disorder  H81.13                      Subjective: Pt reports improved dizziness x24-36 hrs following last visit now returned to baseline.    Objective: See treatment diary below    Assessment: Pt demonstrated improved postural sway and able to abolish with Graston in sitting or Rep flexion in supine.    Plan: Cont POC.     Precautions: PMHx: HTN, CKD II  Dx: provisional classification of cervicogenic dizziness    Manuals 3/11 3/13 3/18 3/20 3/25 3/27 4/1 4/3 4/8 4/10   Rep RET clin OP   1x10    3x10      Rep RET mobs       5x10      Graston suboccipitals         5 min 8 min   PA h/a mobs        C,U  30\"x1 ea     AP h/a mobs        C/  30\"x2     C2-3 ext MWM   1x3 ea          Rep L rot clin OP   1x10          Gr IV T1-4 PA mobs  2x30 ea           R francoise-hallpike    x1         L francoise-hallpike    x1         R roll test    x1         L roll tests    x1         B TMJ protrusion clin OP     2x10 2x10                    Neuro Re-Ed             Postural correction and awareness training 3 min 3 min 5 min  5 min   8 min     Rep RET seated 1x10 1x10    3x10 3x10      Rep RET pt OP seated  6x10 3x10 HEP 3x10 3x10 3x10 3x10     Rep RET pt OP, E seated   3x10          Rep upper c/s flexion supine 3x10     3x10       Rep RET, B rotation seated   1x10     3x10 ea     Rep RET, flex seated        1x10     Rep RET, flex pt OP seated        3x10 HEP    Rep RET, flex pt OP supine        3x10 5x10 5x10   DNF         5\"x5 10\"x10   FT EC         Floor  30\"x2 Floor  60\"x1   FT EO head turns         Floor  30\"x2 Floor  60\"x1   SLS EO         Floor  30\"x2 Floor  60\"x1   Rep T ext over chair maintaining upper c/s flexion  1x20 3x10          R BBQ maneuver for horizontal canal    3 " cycles 2 cycles        Rep TMJ protrusion     3x20    OP  1x10 3x20 1x30    + R glide  1x30      Rep TMJ R lateral glide     1x20    OP  1x20  1x20    OP  1x20      Rep TMJ L lateral glide     1x20    OP  1x20  1x20    OP  1x20                                Ther Ex                                                                                                                     Ther Activity                                       Gait Training                                       Modalities

## 2025-04-15 ENCOUNTER — OFFICE VISIT (OUTPATIENT)
Dept: PHYSICAL THERAPY | Facility: CLINIC | Age: 80
End: 2025-04-15
Payer: MEDICARE

## 2025-04-15 DIAGNOSIS — H81.13 BENIGN PAROXYSMAL POSITIONAL VERTIGO DUE TO BILATERAL VESTIBULAR DISORDER: Primary | ICD-10-CM

## 2025-04-15 PROCEDURE — 97112 NEUROMUSCULAR REEDUCATION: CPT | Performed by: PHYSICAL THERAPIST

## 2025-04-15 NOTE — PROGRESS NOTES
"Daily Note     Today's date: 4/15/2025  Patient name: Franky Hollis  : 1945  MRN: [id number]  Referring provider: Ayla Mckee MD  Dx:   Encounter Diagnosis     ICD-10-CM    1. Benign paroxysmal positional vertigo due to bilateral vestibular disorder  H81.13                      Subjective: Pt reports no improvement in dizziness since last visit.    Objective: See treatment diary below    Assessment: Pt demonstrated P,NW with habituation.    Plan: Assess response nv.     Precautions: PMHx: HTN, CKD II  Dx: provisional classification of cervicogenic dizziness    Manuals 4/15         4/10   Rep RET clin OP             Rep RET mobs             Graston suboccipitals np         8 min                                                                                                                                                  Neuro Re-Ed             Postural correction and awareness training             Matthew duncan habituation 3 pillows  Slow up/  Slow down  X1 cycle    2 pillows  Slow up/  Slow down  X1 cycle    2 pillows  Slow up/  Quick down  X5 cycles                                                                                          Rep RET, flex pt OP supine 1x15         5x10   DNF 10\"x10         10\"x10   FT EC nv         Floor  60\"x1   FT EO head turns nv         Floor  60\"x1   SLS EO nv         Floor  60\"x1                                                                                              Ther Ex                                                                                                                     Ther Activity                                       Gait Training                                       Modalities                                            "

## 2025-04-17 ENCOUNTER — OFFICE VISIT (OUTPATIENT)
Dept: PHYSICAL THERAPY | Facility: CLINIC | Age: 80
End: 2025-04-17
Payer: MEDICARE

## 2025-04-17 DIAGNOSIS — H81.13 BENIGN PAROXYSMAL POSITIONAL VERTIGO DUE TO BILATERAL VESTIBULAR DISORDER: Primary | ICD-10-CM

## 2025-04-17 PROCEDURE — 97112 NEUROMUSCULAR REEDUCATION: CPT | Performed by: PHYSICAL THERAPIST

## 2025-04-17 NOTE — PROGRESS NOTES
"Daily Note     Today's date: 2025  Patient name: Franky Hollis  : 1945  MRN: [id number]  Referring provider: Ayla Mckee MD  Dx:   Encounter Diagnosis     ICD-10-CM    1. Benign paroxysmal positional vertigo due to bilateral vestibular disorder  H81.13                      Subjective: Pt reports increased dizziness x 36 hrs following habituation last visit, especially when performing to L.    Objective: See treatment diary below    Assessment: Pt demonstrated P,NW with modified habituation intensity.    Plan: Assess response nv.     Precautions: PMHx: HTN, CKD II  Dx: provisional classification of cervicogenic dizziness    Manuals 4/15 4/17           Rep RET clin OP             Rep RET mobs             Rolo suboccipitals np                                                                                                                                                           Neuro Re-Ed             Postural correction and awareness training             Matthew duncan habituation 3 pillows  Slow up/  Slow down  X1 cycle    2 pillows  Slow up/  Slow down  X1 cycle    2 pillows  Slow up/  Quick down  X5 cycles 2 pillows  Quick down, slow up to R  4 cycles    2 pillow slow down, slow up to L  x4 cycles                                                                                         Rep RET, flex pt OP supine 1x15 1x15           DNF 10\"x10 10\"x10           FT EC  nv           FT EO head turns  nv           SLS EO  nv                                                                                                      Ther Ex                                                                                                                     Ther Activity                                       Gait Training                                       Modalities                                            "

## 2025-04-22 ENCOUNTER — OFFICE VISIT (OUTPATIENT)
Dept: INTERNAL MEDICINE CLINIC | Facility: CLINIC | Age: 80
End: 2025-04-22
Payer: MEDICARE

## 2025-04-22 ENCOUNTER — OFFICE VISIT (OUTPATIENT)
Dept: PHYSICAL THERAPY | Facility: CLINIC | Age: 80
End: 2025-04-22
Payer: MEDICARE

## 2025-04-22 VITALS
SYSTOLIC BLOOD PRESSURE: 120 MMHG | BODY MASS INDEX: 36 KG/M2 | HEIGHT: 66 IN | TEMPERATURE: 97.9 F | HEART RATE: 80 BPM | OXYGEN SATURATION: 95 % | WEIGHT: 224 LBS | DIASTOLIC BLOOD PRESSURE: 70 MMHG | RESPIRATION RATE: 14 BRPM

## 2025-04-22 DIAGNOSIS — E66.01 CLASS 2 SEVERE OBESITY DUE TO EXCESS CALORIES WITH SERIOUS COMORBIDITY AND BODY MASS INDEX (BMI) OF 37.0 TO 37.9 IN ADULT (HCC): ICD-10-CM

## 2025-04-22 DIAGNOSIS — K21.9 LARYNGOPHARYNGEAL REFLUX (LPR): ICD-10-CM

## 2025-04-22 DIAGNOSIS — H81.13 BENIGN PAROXYSMAL POSITIONAL VERTIGO DUE TO BILATERAL VESTIBULAR DISORDER: Primary | ICD-10-CM

## 2025-04-22 DIAGNOSIS — I10 ESSENTIAL HYPERTENSION: ICD-10-CM

## 2025-04-22 DIAGNOSIS — G24.5 EYE TWITCH: ICD-10-CM

## 2025-04-22 DIAGNOSIS — Z00.00 MEDICARE ANNUAL WELLNESS VISIT, SUBSEQUENT: ICD-10-CM

## 2025-04-22 DIAGNOSIS — N18.2 CHRONIC KIDNEY DISEASE (CKD), STAGE II (MILD): ICD-10-CM

## 2025-04-22 DIAGNOSIS — E53.8 VITAMIN B12 DEFICIENCY: ICD-10-CM

## 2025-04-22 DIAGNOSIS — E66.812 CLASS 2 SEVERE OBESITY DUE TO EXCESS CALORIES WITH SERIOUS COMORBIDITY AND BODY MASS INDEX (BMI) OF 37.0 TO 37.9 IN ADULT (HCC): ICD-10-CM

## 2025-04-22 DIAGNOSIS — H81.319 AUDITORY VERTIGO, UNSPECIFIED LATERALITY: Primary | ICD-10-CM

## 2025-04-22 DIAGNOSIS — R73.03 PREDIABETES: ICD-10-CM

## 2025-04-22 DIAGNOSIS — E78.2 MIXED HYPERLIPIDEMIA: ICD-10-CM

## 2025-04-22 DIAGNOSIS — R26.89 BALANCE DISORDER: ICD-10-CM

## 2025-04-22 PROCEDURE — 97112 NEUROMUSCULAR REEDUCATION: CPT | Performed by: PHYSICAL THERAPIST

## 2025-04-22 PROCEDURE — 99214 OFFICE O/P EST MOD 30 MIN: CPT | Performed by: INTERNAL MEDICINE

## 2025-04-22 PROCEDURE — G0439 PPPS, SUBSEQ VISIT: HCPCS | Performed by: INTERNAL MEDICINE

## 2025-04-22 PROCEDURE — G2211 COMPLEX E/M VISIT ADD ON: HCPCS | Performed by: INTERNAL MEDICINE

## 2025-04-22 NOTE — PATIENT INSTRUCTIONS
Medicare Preventive Visit Patient Instructions  Thank you for completing your Welcome to Medicare Visit or Medicare Annual Wellness Visit today. Your next wellness visit will be due in one year (4/23/2026).  The screening/preventive services that you may require over the next 5-10 years are detailed below. Some tests may not apply to you based off risk factors and/or age. Screening tests ordered at today's visit but not completed yet may show as past due. Also, please note that scanned in results may not display below.  Preventive Screenings:  Service Recommendations Previous Testing/Comments   Colorectal Cancer Screening  Colonoscopy    Fecal Occult Blood Test (FOBT)/Fecal Immunochemical Test (FIT)  Fecal DNA/Cologuard Test  Flexible Sigmoidoscopy Age: 45-75 years old   Colonoscopy: every 10 years (May be performed more frequently if at higher risk)  OR  FOBT/FIT: every 1 year  OR  Cologuard: every 3 years  OR  Sigmoidoscopy: every 5 years  Screening may be recommended earlier than age 45 if at higher risk for colorectal cancer. Also, an individualized decision between you and your healthcare provider will decide whether screening between the ages of 76-85 would be appropriate. Colonoscopy: 11/11/2015  FOBT/FIT: Not on file  Cologuard: Not on file  Sigmoidoscopy: Not on file          Prostate Cancer Screening Individualized decision between patient and health care provider in men between ages of 55-69   Medicare will cover every 12 months beginning on the day after your 50th birthday PSA: No results in last 5 years     Screening Not Indicated     Hepatitis C Screening Once for adults born between 1945 and 1965  More frequently in patients at high risk for Hepatitis C Hep C Antibody: 02/09/2018    Screening Current   Diabetes Screening 1-2 times per year if you're at risk for diabetes or have pre-diabetes Fasting glucose: 99 mg/dL (4/7/2025)  A1C: 6.6 % (4/7/2025)  Screening Current   Cholesterol Screening Once  every 5 years if you don't have a lipid disorder. May order more often based on risk factors. Lipid panel: 04/07/2025  Screening Not Indicated  History Lipid Disorder      Other Preventive Screenings Covered by Medicare:  Abdominal Aortic Aneurysm (AAA) Screening: covered once if your at risk. You're considered to be at risk if you have a family history of AAA or a male between the age of 65-75 who smoking at least 100 cigarettes in your lifetime.  Lung Cancer Screening: covers low dose CT scan once per year if you meet all of the following conditions: (1) Age 55-77; (2) No signs or symptoms of lung cancer; (3) Current smoker or have quit smoking within the last 15 years; (4) You have a tobacco smoking history of at least 20 pack years (packs per day x number of years you smoked); (5) You get a written order from a healthcare provider.  Glaucoma Screening: covered annually if you're considered high risk: (1) You have diabetes OR (2) Family history of glaucoma OR (3)  aged 50 and older OR (4)  American aged 65 and older  Osteoporosis Screening: covered every 2 years if you meet one of the following conditions: (1) Have a vertebral abnormality; (2) On glucocorticoid therapy for more than 3 months; (3) Have primary hyperparathyroidism; (4) On osteoporosis medications and need to assess response to drug therapy.  HIV Screening: covered annually if you're between the age of 15-65. Also covered annually if you are younger than 15 and older than 65 with risk factors for HIV infection. For pregnant patients, it is covered up to 3 times per pregnancy.    Immunizations:  Immunization Recommendations   Influenza Vaccine Annual influenza vaccination during flu season is recommended for all persons aged >= 6 months who do not have contraindications   Pneumococcal Vaccine   * Pneumococcal conjugate vaccine = PCV13 (Prevnar 13), PCV15 (Vaxneuvance), PCV20 (Prevnar 20)  * Pneumococcal polysaccharide vaccine  = PPSV23 (Pneumovax) Adults 19-65 yo with certain risk factors or if 65+ yo  If never received any pneumonia vaccine: recommend Prevnar 20 (PCV20)  Give PCV20 if previously received 1 dose of PCV13 or PPSV23   Hepatitis B Vaccine 3 dose series if at intermediate or high risk (ex: diabetes, end stage renal disease, liver disease)   Respiratory syncytial virus (RSV) Vaccine - COVERED BY MEDICARE PART D  * RSVPreF3 (Arexvy) CDC recommends that adults 60 years of age and older may receive a single dose of RSV vaccine using shared clinical decision-making (SCDM)   Tetanus (Td) Vaccine - COST NOT COVERED BY MEDICARE PART B Following completion of primary series, a booster dose should be given every 10 years to maintain immunity against tetanus. Td may also be given as tetanus wound prophylaxis.   Tdap Vaccine - COST NOT COVERED BY MEDICARE PART B Recommended at least once for all adults. For pregnant patients, recommended with each pregnancy.   Shingles Vaccine (Shingrix) - COST NOT COVERED BY MEDICARE PART B  2 shot series recommended in those 19 years and older who have or will have weakened immune systems or those 50 years and older     Health Maintenance Due:      Topic Date Due   • Hepatitis C Screening  Completed   • Colorectal Cancer Screening  Discontinued     Immunizations Due:      Topic Date Due   • COVID-19 Vaccine (4 - 2024-25 season) 09/01/2024     Advance Directives   What are advance directives?  Advance directives are legal documents that state your wishes and plans for medical care. These plans are made ahead of time in case you lose your ability to make decisions for yourself. Advance directives can apply to any medical decision, such as the treatments you want, and if you want to donate organs.   What are the types of advance directives?  There are many types of advance directives, and each state has rules about how to use them. You may choose a combination of any of the following:  Living will:  This  is a written record of the treatment you want. You can also choose which treatments you do not want, which to limit, and which to stop at a certain time. This includes surgery, medicine, IV fluid, and tube feedings.   Durable power of  for healthcare (DPAHC):  This is a written record that states who you want to make healthcare choices for you when you are unable to make them for yourself. This person, called a proxy, is usually a family member or a friend. You may choose more than 1 proxy.  Do not resuscitate (DNR) order:  A DNR order is used in case your heart stops beating or you stop breathing. It is a request not to have certain forms of treatment, such as CPR. A DNR order may be included in other types of advance directives.  Medical directive:  This covers the care that you want if you are in a coma, near death, or unable to make decisions for yourself. You can list the treatments you want for each condition. Treatment may include pain medicine, surgery, blood transfusions, dialysis, IV or tube feedings, and a ventilator (breathing machine).  Values history:  This document has questions about your views, beliefs, and how you feel and think about life. This information can help others choose the care that you would choose.  Why are advance directives important?  An advance directive helps you control your care. Although spoken wishes may be used, it is better to have your wishes written down. Spoken wishes can be misunderstood, or not followed. Treatments may be given even if you do not want them. An advance directive may make it easier for your family to make difficult choices about your care.   Weight Management   Why it is important to manage your weight:  Being overweight increases your risk of health conditions such as heart disease, high blood pressure, type 2 diabetes, and certain types of cancer. It can also increase your risk for osteoarthritis, sleep apnea, and other respiratory problems. Aim  for a slow, steady weight loss. Even a small amount of weight loss can lower your risk of health problems.  How to lose weight safely:  A safe and healthy way to lose weight is to eat fewer calories and get regular exercise. You can lose up about 1 pound a week by decreasing the number of calories you eat by 500 calories each day.   Healthy meal plan for weight management:  A healthy meal plan includes a variety of foods, contains fewer calories, and helps you stay healthy. A healthy meal plan includes the following:  Eat whole-grain foods more often.  A healthy meal plan should contain fiber. Fiber is the part of grains, fruits, and vegetables that is not broken down by your body. Whole-grain foods are healthy and provide extra fiber in your diet. Some examples of whole-grain foods are whole-wheat breads and pastas, oatmeal, brown rice, and bulgur.  Eat a variety of vegetables every day.  Include dark, leafy greens such as spinach, kale, donna greens, and mustard greens. Eat yellow and orange vegetables such as carrots, sweet potatoes, and winter squash.   Eat a variety of fruits every day.  Choose fresh or canned fruit (canned in its own juice or light syrup) instead of juice. Fruit juice has very little or no fiber.  Eat low-fat dairy foods.  Drink fat-free (skim) milk or 1% milk. Eat fat-free yogurt and low-fat cottage cheese. Try low-fat cheeses such as mozzarella and other reduced-fat cheeses.  Choose meat and other protein foods that are low in fat.  Choose beans or other legumes such as split peas or lentils. Choose fish, skinless poultry (chicken or turkey), or lean cuts of red meat (beef or pork). Before you cook meat or poultry, cut off any visible fat.   Use less fat and oil.  Try baking foods instead of frying them. Add less fat, such as margarine, sour cream, regular salad dressing and mayonnaise to foods. Eat fewer high-fat foods. Some examples of high-fat foods include french fries, doughnuts, ice  cream, and cakes.  Eat fewer sweets.  Limit foods and drinks that are high in sugar. This includes candy, cookies, regular soda, and sweetened drinks.  Exercise:  Exercise at least 30 minutes per day on most days of the week. Some examples of exercise include walking, biking, dancing, and swimming. You can also fit in more physical activity by taking the stairs instead of the elevator or parking farther away from stores. Ask your healthcare provider about the best exercise plan for you.      © Copyright [employer] 2018 Information is for End User's use only and may not be sold, redistributed or otherwise used for commercial purposes. All illustrations and images included in CareNotes® are the copyrighted property of A.D.A[employer]M., Inc. or [employer]

## 2025-04-22 NOTE — PROGRESS NOTES
"Daily Note     Today's date: 2025  Patient name: Franky Hollis  : 1945  MRN: [id number]  Referring provider: Ayla Mckee MD  Dx:   Encounter Diagnosis     ICD-10-CM    1. Benign paroxysmal positional vertigo due to bilateral vestibular disorder  H81.13                      Subjective: Pt reports P,NW progressing to NE,NE with habituation over the weekend.    Objective: See treatment diary below    Increased habituation intensity    Assessment: Pt demonstrated P,NW with habituation progressions.    Plan: Cont POC.     Precautions: PMHx: HTN, CKD II  Dx: dizziness with a habituation treatment classification    Manuals 4/15 4/17 4/22          Rep RET clin OP             Rep RET mobs             Rloo suboccipitals np                                                                                                                                                           Neuro Re-Ed             Postural correction and awareness training             Matthew duncan habituation 3 pillows  Slow up/  Slow down  X1 cycle    2 pillows  Slow up/  Slow down  X1 cycle    2 pillows  Slow up/  Quick down  X5 cycles 2 pillows  Quick down, slow up to R  4 cycles    2 pillow slow down, slow up to L  x4 cycles 2 pillows  Quick down, quick up to R  x4 cycles    2 pillow quick down, slow up to L  x4 cycles                                                                                        Rep RET, flex pt OP supine 1x15 1x15           DNF 10\"x10 10\"x10           FT EC  nv L=S  60\"x2          FT EO head turns  nv           SLS EO  nv L=S  60\"x2 ea                                                                                                     Ther Ex                                                                                                                     Ther Activity                                       Gait Training                                       Modalities                               "

## 2025-04-22 NOTE — PROGRESS NOTES
Name: Franky Hollis      : 1945      MRN: [id number]  Encounter Provider: Ayla Mckee MD  Encounter Date: 2025   Encounter department: [facility] INTERNAL MEDICINE  :  Assessment & Plan  Auditory vertigo, unspecified laterality  Ongoing physical therapy. Follow up with ENT as scheduled.  On diuretics.  Discussed imaging since ongoing symptoms, ?NPH, mass.    Orders:  •  MRI brain wo contrast; Future    Essential hypertension  BP improved, taking valsartan 40 mg bid.  Orders:  •  CBC and differential; Future  •  TSH, 3rd generation with Free T4 reflex; Future    Mixed hyperlipidemia  TG improved, LDL stable. On pravastatin 20 mg.  Orders:  •  Lipid panel; Future    Prediabetes  A1c now in diabetes range.  Reviewed portions, low carb diet in detail.  Discussed medications such as metformin, GLP-1.  Orders:  •  Hemoglobin A1C; Future    Chronic kidney disease (CKD), stage II (mild)  Lab Results   Component Value Date    EGFR 69 2025    EGFR 73 10/08/2024    EGFR 69 2024    CREATININE 1.01 2025    CREATININE 0.98 10/08/2024    CREATININE 1.02 2024     Stable.  Orders:  •  Comprehensive metabolic panel; Future  •  Vitamin D 25 hydroxy; Future    Laryngopharyngeal reflux (LPR)  Taking PPI bid.       Eye twitch  Ongoing Botox injections, sees Ophtha.    Orders:  •  MRI brain wo contrast; Future    Class 2 severe obesity due to excess calories with serious comorbidity and body mass index (BMI) of 37.0 to 37.9 in adult (HCC)  Stable weight.  Orders:  •  Vitamin D 25 hydroxy; Future    Vitamin B12 deficiency  Taking B12.  Orders:  •  Vitamin B12; Future    Balance disorder  No falls.  Orders:  •  MRI brain wo contrast; Future    Medicare annual wellness visit, subsequent         Follow up in 6 months or as needed.     Preventive health issues were discussed with patient, and age appropriate screening tests were ordered as noted in patient's After Visit Summary.  Personalized health advice and appropriate referrals for health education or preventive services given if needed, as noted in patient's After Visit Summary.    History of Present Illness     He continues to feel unstable on his feet, still has the dizziness most of the time. He would experience increase dizziness with certain movement or activity, cannot tell which. No headaches, nausea or vomiting. He has been to physical therapy, has not really helped.  They are trying different maneuvers now.  He had seen ENT recently also, had a recent hearing test.  He denies any falls, nausea or vomiting.  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    He has been receiving Botox injections for his facial twitch.  He did receive 1 recently.  No vision problems.  Nuys any chest pain, shortness of breath, palpitations or other symptoms.  He reports feeling slightly more stressed and anxious because of the ongoing dizziness.  His wife is starting to get worried also.       Patient Care Team:  Ayla Mckee MD as PCP - General  Ana Colbert MD    Review of Systems   Constitutional:  Negative for appetite change and fatigue.   HENT:  Negative for congestion and postnasal drip.    Eyes: Negative.  Negative for visual disturbance.   Respiratory:  Negative for cough, chest tightness and shortness of breath.    Cardiovascular:  Negative for chest pain, palpitations and leg swelling.   Gastrointestinal:  Negative for abdominal pain and constipation.   Genitourinary:  Negative for dysuria, frequency and urgency.   Musculoskeletal:  Negative for arthralgias.   Skin:  Negative for rash and wound.   Neurological:  Positive for dizziness and light-headedness. Negative for syncope, weakness, numbness and headaches.   Hematological:  Negative for adenopathy. Does not bruise/bleed easily.   Psychiatric/Behavioral:  Negative for sleep disturbance. The patient is not  nervous/anxious.      Medical History Reviewed by provider this encounter:       Annual Wellness Visit Questionnaire   Franky is here for his Subsequent Wellness visit. Last Medicare Wellness visit information reviewed, patient interviewed and updates made to the record today.      Health Risk Assessment:   Patient rates overall health as fair. Patient feels that their physical health rating is same. Patient is satisfied with their life. Eyesight was rated as slightly worse. Hearing was rated as same. Patient feels that their emotional and mental health rating is same. Patients states they are sometimes angry. Patient states they are never, rarely unusually tired/fatigued. Pain experienced in the last 7 days has been none.     Depression Screening:   PHQ-2 Score: 0      Fall Risk Screening:   In the past year, patient has experienced: no history of falling in past year      Home Safety:  Patient does not have trouble with stairs inside or outside of their home. Patient has working smoke alarms and has working carbon monoxide detector. Home safety hazards include: none.     Nutrition:   Current diet is Regular, No Added Salt and Limited junk food.     Medications:   Patient is currently taking over-the-counter supplements. OTC medications include: see medication list. Patient is able to manage medications.     Activities of Daily Living (ADLs)/Instrumental Activities of Daily Living (IADLs):   Walk and transfer into and out of bed and chair?: Yes  Dress and groom yourself?: Yes    Bathe or shower yourself?: Yes    Feed yourself? Yes  Do your laundry/housekeeping?: Yes  Manage your money, pay your bills and track your expenses?: Yes  Make your own meals?: Yes    Do your own shopping?: Yes    Previous Hospitalizations:   Any hospitalizations or ED visits within the last 12 months?: No      Advance Care Planning:   Living will: Yes    Durable POA for healthcare: Yes    Advanced directive: Yes    End of Life Decisions  reviewed with patient: No      Cognitive Screening:   Provider or family/friend/caregiver concerned regarding cognition?: No    Preventive Screenings      Cardiovascular Screening:    General: History Lipid Disorder and Screening Current      Diabetes Screening:     General: Screening Current      Colorectal Cancer Screening:     General: Screening Not Indicated      Prostate Cancer Screening:    General: Screening Not Indicated      Osteoporosis Screening:    General: Screening Not Indicated      Abdominal Aortic Aneurysm (AAA) Screening:        General: Screening Not Indicated      Lung Cancer Screening:     General: Screening Not Indicated      Hepatitis C Screening:    General: Screening Current    Immunizations:  - Immunizations due: Zoster (Shingrix)    Screening, Brief Intervention, and Referral to Treatment (SBIRT)     Screening  Typical number of drinks in a day: 0  Typical number of drinks in a week: 0  Interpretation: Low risk drinking behavior.    Single Item Drug Screening:  How often have you used an illegal drug (including marijuana) or a prescription medication for non-medical reasons in the past year? never    Single Item Drug Screen Score: 0  Interpretation: Negative screen for possible drug use disorder    Other Counseling Topics:   Regular weightbearing exercise and calcium and vitamin D intake.     Social Drivers of Health     Financial Resource Strain: Low Risk  (4/18/2023)    Overall Financial Resource Strain (CARDIA)    • Difficulty of Paying Living Expenses: Not hard at all   Food Insecurity: No Food Insecurity (4/22/2025)    Hunger Vital Sign    • Worried About Running Out of Food in the Last Year: Never true    • Ran Out of Food in the Last Year: Never true   Transportation Needs: No Transportation Needs (4/22/2025)    PRAPARE - Transportation    • Lack of Transportation (Medical): No    • Lack of Transportation (Non-Medical): No   Housing Stability: Low Risk  (4/22/2025)    Housing  "Stability Vital Sign    • Unable to Pay for Housing in the Last Year: No    • Number of Times Moved in the Last Year: 0    • Homeless in the Last Year: No   Utilities: Not At Risk (4/22/2025)    [facility] Utilities    • Threatened with loss of utilities: No     No results found.    Objective   /70 (BP Location: Left arm, Patient Position: Sitting, Cuff Size: Large)   Pulse 80   Temp 97.9 °F (36.6 °C)   Resp 14   Ht 5' 6\" (1.676 m)   Wt 102 kg (224 lb)   SpO2 95%   BMI 36.15 kg/m²     Physical Exam  Vitals and nursing note reviewed.   Constitutional:       General: He is not in acute distress.     Appearance: He is well-developed.   HENT:      Head: Normocephalic and atraumatic.      Right Ear: External ear normal.      Left Ear: External ear normal.      Mouth/Throat:      Mouth: Mucous membranes are moist.   Eyes:      Conjunctiva/sclera: Conjunctivae normal.   Cardiovascular:      Rate and Rhythm: Normal rate and regular rhythm.      Heart sounds: No murmur heard.  Pulmonary:      Effort: Pulmonary effort is normal. No respiratory distress.      Breath sounds: Normal breath sounds.   Abdominal:      Palpations: Abdomen is soft.      Tenderness: There is no abdominal tenderness.   Musculoskeletal:         General: No swelling.      Cervical back: Neck supple.   Skin:     General: Skin is warm and dry.   Neurological:      Mental Status: He is alert.      Cranial Nerves: Cranial nerves 2-12 are intact. No facial asymmetry.      Sensory: Sensation is intact.      Motor: Motor function is intact.      Coordination: Coordination is intact. Finger-Nose-Finger Test normal.      Comments: (+) horizontal nystagmus   Psychiatric:         Mood and Affect: Mood normal.           Labs & imaging results reviewed with patient.    "

## 2025-04-22 NOTE — ASSESSMENT & PLAN NOTE
BP improved, taking valsartan 40 mg bid.  Orders:  •  CBC and differential; Future  •  TSH, 3rd generation with Free T4 reflex; Future

## 2025-04-22 NOTE — ASSESSMENT & PLAN NOTE
Lab Results   Component Value Date    EGFR 69 04/07/2025    EGFR 73 10/08/2024    EGFR 69 03/25/2024    CREATININE 1.01 04/07/2025    CREATININE 0.98 10/08/2024    CREATININE 1.02 03/25/2024     Stable.  Orders:  •  Comprehensive metabolic panel; Future  •  Vitamin D 25 hydroxy; Future

## 2025-04-22 NOTE — ASSESSMENT & PLAN NOTE
Ongoing physical therapy. Follow up with ENT as scheduled.  On diuretics.  Discussed imaging since ongoing symptoms, ?NPH, mass.    Orders:  •  MRI brain wo contrast; Future

## 2025-04-22 NOTE — ASSESSMENT & PLAN NOTE
A1c now in diabetes range.  Reviewed portions, low carb diet in detail.  Discussed medications such as metformin, GLP-1.  Orders:  •  Hemoglobin A1C; Future

## 2025-04-24 ENCOUNTER — APPOINTMENT (OUTPATIENT)
Dept: PHYSICAL THERAPY | Facility: CLINIC | Age: 80
End: 2025-04-24
Payer: MEDICARE

## 2025-05-06 ENCOUNTER — OFFICE VISIT (OUTPATIENT)
Dept: PHYSICAL THERAPY | Facility: CLINIC | Age: 80
End: 2025-05-06
Attending: INTERNAL MEDICINE
Payer: MEDICARE

## 2025-05-06 DIAGNOSIS — H81.13 BENIGN PAROXYSMAL POSITIONAL VERTIGO DUE TO BILATERAL VESTIBULAR DISORDER: Primary | ICD-10-CM

## 2025-05-06 PROCEDURE — 97112 NEUROMUSCULAR REEDUCATION: CPT | Performed by: PHYSICAL THERAPIST

## 2025-05-06 PROCEDURE — 97116 GAIT TRAINING THERAPY: CPT | Performed by: PHYSICAL THERAPIST

## 2025-05-06 NOTE — PROGRESS NOTES
"Daily Note     Today's date: 2025  Patient name: Franky Hollis  : 1945  MRN: [id number]  Referring provider: Ayla Mckee MD  Dx:   Encounter Diagnosis     ICD-10-CM    1. Benign paroxysmal positional vertigo due to bilateral vestibular disorder  H81.13                      Subjective: Pt reports no dizziness at rest or with habituation. He reports postural sway and feelings of imbalance when he is moving around on his feet.    Objective: See treatment diary below    Increased habituation intensity  Updated HEP for increased emphasis on balance vs habituation    Assessment: Pt demonstrated NE,NE with fast habituation and greatest postural sway with head turns VOR cx or eyes closed.    Plan: Cont POC.     Precautions: PMHx: HTN, CKD II  Dx: dizziness with a habituation treatment classification    Manuals 4/15 4/17 4/22 5/5         Rep RET clin OP             Rep RET mobs             Graston suboccipitals np                                                                                                                                                           Neuro Re-Ed             Postural correction and awareness training             Matthew duncan habituation 3 pillows  Slow up/  Slow down  X1 cycle    2 pillows  Slow up/  Slow down  X1 cycle    2 pillows  Slow up/  Quick down  X5 cycles 2 pillows  Quick down, slow up to R  4 cycles    2 pillow slow down, slow up to L  x4 cycles 2 pillows  Quick down, quick up to R  x4 cycles    2 pillow quick down, slow up to L  x4 cycles 2 pillow quick down, quick up to B  x4 cycles                                                                                       Rep RET, flex pt OP supine 1x15 1x15           DNF 10\"x10 10\"x10           FT EO    L=S  60\"x1    L7  60\"x1         FT EC  nv L=S  60\"x2 L=S  60\"x2         FT EO head turns  nv  L=S  60\"x1         FT EO head turns with gaze stab    L=S  60\"x1         FT EO head nods             FT EO head " "nobs with gaze stab             SLS EO  nv L=S  60\"x2 ea L=S  60\"x1 ea                                                                                                    Ther Ex                                                                                                                     Ther Activity                                       Gait Training             Gait training with head turns    6x40 ft         Gait training with head turns and gaze stab    2x40 ft         Gait training with head nods    2x40 ft                                   Modalities                                            "

## 2025-05-08 ENCOUNTER — EVALUATION (OUTPATIENT)
Dept: PHYSICAL THERAPY | Facility: CLINIC | Age: 80
End: 2025-05-08
Attending: INTERNAL MEDICINE
Payer: MEDICARE

## 2025-05-08 DIAGNOSIS — H81.13 BENIGN PAROXYSMAL POSITIONAL VERTIGO DUE TO BILATERAL VESTIBULAR DISORDER: Primary | ICD-10-CM

## 2025-05-08 PROCEDURE — 97112 NEUROMUSCULAR REEDUCATION: CPT | Performed by: PHYSICAL THERAPIST

## 2025-05-08 PROCEDURE — 97116 GAIT TRAINING THERAPY: CPT | Performed by: PHYSICAL THERAPIST

## 2025-05-08 NOTE — PROGRESS NOTES
"Daily Note     Today's date: 2025  Patient name: Franky Hollis  : 1945  MRN: [id number]  Referring provider: Ayla Mckee MD  Dx:   Encounter Diagnosis     ICD-10-CM    1. Benign paroxysmal positional vertigo due to bilateral vestibular disorder  H81.13                      Subjective: Pt reports no dizziness with lying, sitting, or standing. He reports feelings of imbalance during functional mobility that requires head movements or turning.    Objective: See treatment diary below    Assessment: Pt demonstrated no dizziness and moderate sway with EC > VOR cx during functional mobility.    Plan: Cont POC.     Precautions: PMHx: HTN, CKD II  Dx: dizziness with a habituation treatment classification    Manuals 4/15 4/17 4/22 5/5 5/8        Rep RET clin OP             Rep RET mobs             Graston suboccipitals np                                                                                                                                                           Neuro Re-Ed             Postural correction and awareness training             Matthew duncan habituation 3 pillows  Slow up/  Slow down  X1 cycle    2 pillows  Slow up/  Slow down  X1 cycle    2 pillows  Slow up/  Quick down  X5 cycles 2 pillows  Quick down, slow up to R  4 cycles    2 pillow slow down, slow up to L  x4 cycles 2 pillows  Quick down, quick up to R  x4 cycles    2 pillow quick down, slow up to L  x4 cycles 2 pillow quick down, quick up to B  x4 cycles HEP                                                                                      Rep RET, flex pt OP supine 1x15 1x15           DNF 10\"x10 10\"x10           FT EO    L=S  60\"x1    L7  60\"x1 L7  60\"x2        FT EC  nv L=S  60\"x2 L=S  60\"x2 L=S  60\"x2        FT EO head turns  nv  L=S  60\"x1 L=S  60\"x1        FT EO head turns with gaze stab    L=S  60\"x1 L=S  60\"x1        FT EO head nods     L=S  60\"x1 L10       FT EO head nobs with gaze stab     L=S  60\"x1      " "  SLS EO  nv L=S  60\"x2 ea L=S  60\"x1 ea L=S  60\"x1 ea                                                                                                   Ther Ex                                                                                                                     Ther Activity             STS on foam     3x10                     Gait Training             Gait training with head turns    6x40 ft 6x40 ft        Gait training with head turns and gaze stab    2x40 ft 6x40 ft        Gait training with head nods    2x40 ft 6x40 ft                                  Modalities                                              "

## 2025-05-08 NOTE — PROGRESS NOTES
PT Re-Evaluation     Today's date: 2025  Patient name: Franky Hollis  : 1945  MRN: [id number]  Referring provider: Ayla Mckee MD  Dx:   Encounter Diagnosis     ICD-10-CM    1. Benign paroxysmal positional vertigo due to bilateral vestibular disorder  H81.13           Start Time: 1400  Stop Time: 1445  Total time in clinic (min): 45 minutes    Assessment  Impairments: abnormal muscle firing, abnormal or restricted ROM, abnormal movement, activity intolerance, impaired balance, lacks appropriate home exercise program, safety issue, poor posture  and poor body mechanics    Assessment details: Pt demonstrates dizziness with a habituation classification and demonstrates improved balance and a resolution of baseline dizziness and imbalance with static positions. He will continue to benefit from skilled PT services to address his impairments in order to further improve balance, posture, and dizziness during functional mobility.  Understanding of Dx/Px/POC: good     Prognosis: fair    Goals  STG - 2-4 wks  1) pt will improve c/s ROM 50% (met)  2) pt will improve lightheadedness 50% (met)    LTG - 4-8 wks  1) pt will normalize c/s ROM (met)  2) pt will resolve lightheadedness at baseline (met)  3) pt will demonstrate 30 sec SLS EO    Plan  Patient would benefit from: skilled physical therapy    Planned therapy interventions: neuromuscular re-education, postural training, self care, strengthening, stretching, therapeutic activities, therapeutic exercise, flexibility, functional ROM exercises, graded activity, graded exercise, balance, body mechanics training and gait training    Frequency: 2x week  Duration in weeks: 6  Treatment plan discussed with: patient        Subjective Evaluation    History of Present Illness  Mechanism of injury: Pt is a 80 y.o. male with PMHx significant for HTN, CKD. He reports a resolution of dizziness and postural sway at baseline in lying, sitting, and standing. He  reports no dizziness but continued feelings of imbalance and postural sway when his functional balance is challenged.  Patient Goals  Patient goals for therapy: improved balance  Patient goal: resolve lightheadness  Pain  No pain reported      Diagnostic Tests  No diagnostic tests performed  Treatments  Treatments tried: home epley maneuver - no change.  Current treatment: physical therapy        Objective     Concurrent Complaints  Positive for aural fullness (R). Negative for headaches, tinnitus and visual change    Active Range of Motion   Cervical/Thoracic Spine       Cervical  Subcranial protraction:   Restriction level: minimal  Subcranial retraction:   Restriction level: minimal  Flexion:  Restriction level: minimal  Extension:  Restriction level: minimal  Left lateral flexion:  Restriction level: maximal  Right lateral flexion:  Restriction level maximal  Left rotation:  Restriction level: moderate  Right rotation:  Restriction level: moderate  Mechanical Assessment    Cervical    Seated Protrusion: repeated movements   NE,NE  Seated retraction: repeated movements   NE,NE    Thoracic      Lumbar      Tests   Cervical   Negative Sharp-Colby test and VBI.     Functional Assessment        Comments  Balance  FT EC 60 sec, mod R>L sway  SLS EO R 15 sec, L 10 sec  Neuro Exam:     Dizziness  Positive for disequilibrium and rocking or swaying.   Negative for vertigo, oscillopsia, motion sickness, light-headedness, diplopia and floating or swimming.     Exacerbating factors  Negative for bending over, rolling in bed, looking up, walking, turning head, supine to/from sitting, optokinetic movement and walking in busy environment.     Headaches   Patient reports headaches: No.     Positional testing   Sujata-Hallpike   Left posterior canal: WNL  Right posterior canal: WNL  Roll test   Left horizontal canal: WNL  Right horizontal canal: WNL  Positional testing comment: RANDI levine 2 pillows, quick up/down NE,NE 4  cycles

## 2025-05-13 ENCOUNTER — OFFICE VISIT (OUTPATIENT)
Dept: PHYSICAL THERAPY | Facility: CLINIC | Age: 80
End: 2025-05-13
Attending: INTERNAL MEDICINE
Payer: MEDICARE

## 2025-05-13 DIAGNOSIS — H81.13 BENIGN PAROXYSMAL POSITIONAL VERTIGO DUE TO BILATERAL VESTIBULAR DISORDER: Primary | ICD-10-CM

## 2025-05-13 PROCEDURE — 97116 GAIT TRAINING THERAPY: CPT | Performed by: PHYSICAL THERAPIST

## 2025-05-13 PROCEDURE — 97112 NEUROMUSCULAR REEDUCATION: CPT | Performed by: PHYSICAL THERAPIST

## 2025-05-13 NOTE — PROGRESS NOTES
"Daily Note     Today's date: 2025  Patient name: Franky Hollis  : 1945  MRN: [id number]  Referring provider: Ayla Mckee MD  Dx:   Encounter Diagnosis     ICD-10-CM    1. Benign paroxysmal positional vertigo due to bilateral vestibular disorder  H81.13                      Subjective: Pt reports no dizziness, only feelings of imbalance when changing directions or moving his head. He reports no difficulty doing light yardwork over the weekend.    Objective: See treatment diary below    Assessment: Pt demonstrated improved static and dynamic balance under all conditions except SLS.    Plan: Cont POC.     Precautions: PMHx: HTN, CKD II  Dx: dizziness with a habituation treatment classification    Manuals 4/15 4/17 4/22 5/5 5/8 5/13                                                           Neuro Re-Ed             Postural correction and awareness training             Matthew duncan habituation 3 pillows  Slow up/  Slow down  X1 cycle    2 pillows  Slow up/  Slow down  X1 cycle    2 pillows  Slow up/  Quick down  X5 cycles 2 pillows  Quick down, slow up to R  4 cycles    2 pillow slow down, slow up to L  x4 cycles 2 pillows  Quick down, quick up to R  x4 cycles    2 pillow quick down, slow up to L  x4 cycles 2 pillow quick down, quick up to B  x4 cycles HEP                                                                                      Rep RET, flex pt OP supine 1x15 1x15           DNF 10\"x10 10\"x10           FT EO    L=S  60\"x1    L7  60\"x1 L7  60\"x2 L7  60\"x2       FT EC  nv L=S  60\"x2 L=S  60\"x2 L=S  60\"x2 L=S  60\"x2 L12      FT EO head turns  nv  L=S  60\"x1 L=S  60\"x1 L=S  60\"x1 L=S      FT EO head turns with gaze stab    L=S  60\"x1 L=S  60\"x1 L=S  60\"x1 L12      FT EO head nods     L=S  60\"x1 L=S  60\"x1 L12      FT EO head nobs with gaze stab     L=S  60\"x1 L10  60\"x1       SLS EO  nv L=S  60\"x2 ea L=S  60\"x1 ea L=S  60\"x1 ea L=S  60\"x1 ea       FT EO trumpet simulation      " "Floor  60\"x1    Foam  60\"x1                                                                                     Ther Ex                                                                                                                     Ther Activity             STS on foam     3x10                     Gait Training             Gait training with head turns    6x40 ft 6x40 ft 4x40 ft       Gait training with head turns and gaze stab    2x40 ft 6x40 ft 4x40 ft       Gait training with head nods and gaze stab    2x40 ft 6x40 ft 4x40 ft       Gait training with head nods      4x40 ft                    Modalities                                                "

## 2025-05-15 ENCOUNTER — OFFICE VISIT (OUTPATIENT)
Dept: PHYSICAL THERAPY | Facility: CLINIC | Age: 80
End: 2025-05-15
Attending: INTERNAL MEDICINE
Payer: MEDICARE

## 2025-05-15 DIAGNOSIS — H81.13 BENIGN PAROXYSMAL POSITIONAL VERTIGO DUE TO BILATERAL VESTIBULAR DISORDER: Primary | ICD-10-CM

## 2025-05-15 PROCEDURE — 97112 NEUROMUSCULAR REEDUCATION: CPT | Performed by: PHYSICAL THERAPIST

## 2025-05-17 ENCOUNTER — HOSPITAL ENCOUNTER (OUTPATIENT)
Dept: MRI IMAGING | Facility: HOSPITAL | Age: 80
Discharge: HOME | End: 2025-05-17
Attending: INTERNAL MEDICINE
Payer: MEDICARE

## 2025-05-17 DIAGNOSIS — H81.319 AUDITORY VERTIGO, UNSPECIFIED LATERALITY: ICD-10-CM

## 2025-05-17 DIAGNOSIS — R26.89 BALANCE DISORDER: ICD-10-CM

## 2025-05-17 DIAGNOSIS — G24.5 EYE TWITCH: ICD-10-CM

## 2025-05-17 PROCEDURE — 70551 MRI BRAIN STEM W/O DYE: CPT

## 2025-05-19 ENCOUNTER — RESULTS FOLLOW-UP (OUTPATIENT)
Dept: INTERNAL MEDICINE CLINIC | Facility: CLINIC | Age: 80
End: 2025-05-19

## 2025-05-20 ENCOUNTER — OFFICE VISIT (OUTPATIENT)
Dept: PHYSICAL THERAPY | Facility: CLINIC | Age: 80
End: 2025-05-20
Attending: INTERNAL MEDICINE
Payer: MEDICARE

## 2025-05-20 DIAGNOSIS — H81.13 BENIGN PAROXYSMAL POSITIONAL VERTIGO DUE TO BILATERAL VESTIBULAR DISORDER: Primary | ICD-10-CM

## 2025-05-20 PROCEDURE — 97112 NEUROMUSCULAR REEDUCATION: CPT

## 2025-05-20 NOTE — PROGRESS NOTES
"Daily Note     Today's date: 2025  Patient name: Franky Hollis  : 1945  MRN: [id number]  Referring provider: Ayla Mckee MD  Dx:   Encounter Diagnosis     ICD-10-CM    1. Benign paroxysmal positional vertigo due to bilateral vestibular disorder  H81.13           Start Time: 1030  Stop Time: 1110  Total time in clinic (min): 40 minutes    Subjective: Patient reports no change in his dizziness since last visit. He notes it's his usual baseline symptoms currently. He notes that the red-daroff exercise has not been improving his symptoms recently.     Objective: See treatment diary below    Assessment: Patient was very challenged with the progressions on the biodex listed below. He did required UE and therapist assist at times. Pt may benefit from performing habituation 1xD for symptomatic relief f/b balance HEP to address impairments.    Plan: Assess response nv.     Precautions: PMHx: HTN, CKD II  Dx: dizziness with a habituation treatment classification    Manuals 4/15 4/17 4/22 5/5 5/8 5/13 5/15 5/20                                                         Neuro Re-Ed             Postural correction and awareness training             Matthew duncan habituation 3 pillows  Slow up/  Slow down  X1 cycle    2 pillows  Slow up/  Slow down  X1 cycle    2 pillows  Slow up/  Quick down  X5 cycles 2 pillows  Quick down, slow up to R  4 cycles    2 pillow slow down, slow up to L  x4 cycles 2 pillows  Quick down, quick up to R  x4 cycles    2 pillow quick down, slow up to L  x4 cycles 2 pillow quick down, quick up to B  x4 cycles HEP                                                                                      Rep RET, flex pt OP supine 1x15 1x15           DNF 10\"x10 10\"x10           FT EO    L=S  60\"x1    L7  60\"x1 L7  60\"x2 L7  60\"x2 L7  60\"x2 L7  60\"x2     FT EC  nv L=S  60\"x2 L=S  60\"x2 L=S  60\"x2 L=S  60\"x2 L=S  60\"x2 L12  60\"x2     FT EO head turns  nv  L=S  60\"x1 L=S  60\"x1 L=S  60\"x1 " "L=S  60\"x1 L12  60\"x2     FT EO head turns with gaze stab    L=S  60\"x1 L=S  60\"x1 L=S  60\"x1 L12  60\"x1 L12  60\"x1     FT EO head nods     L=S  60\"x1 L=S  60\"x1 L12  60\"x1 L12  60\"x1     FT EO head nobs with gaze stab     L=S  60\"x1 L10  60\"x1 L10  60\"x1 L10  60\"x1     SLS EO  nv L=S  60\"x2 ea L=S  60\"x1 ea L=S  60\"x1 ea L=S  60\"x1 ea HEP      FT EO trumpet simulation      Floor  60\"x1    Foam  60\"x1 Foam  60\"x2 Foam  60\"x2                                                                                   Ther Ex                                                                                                                     Ther Activity             STS on foam     3x10                     Gait Training             Gait training with head turns    6x40 ft 6x40 ft 4x40 ft 6x40 ft 6x40 ft     Gait training with head turns and gaze stab    2x40 ft 6x40 ft 4x40 ft 4x40 ft 4x40 ft     Gait training with head nods and gaze stab    2x40 ft 6x40 ft 4x40 ft 4x40 ft 4x40 ft     Gait training with head nods      4x40 ft 4x40 ft 4x40 ft                  Modalities                                          "

## 2025-05-22 ENCOUNTER — OFFICE VISIT (OUTPATIENT)
Dept: PHYSICAL THERAPY | Facility: CLINIC | Age: 80
End: 2025-05-22
Attending: INTERNAL MEDICINE
Payer: MEDICARE

## 2025-05-22 DIAGNOSIS — H81.13 BENIGN PAROXYSMAL POSITIONAL VERTIGO DUE TO BILATERAL VESTIBULAR DISORDER: Primary | ICD-10-CM

## 2025-05-22 PROCEDURE — 97116 GAIT TRAINING THERAPY: CPT | Performed by: PHYSICAL THERAPIST

## 2025-05-22 PROCEDURE — 97112 NEUROMUSCULAR REEDUCATION: CPT | Performed by: PHYSICAL THERAPIST

## 2025-05-22 NOTE — PROGRESS NOTES
"Daily Note     Today's date: 2025  Patient name: Franky Hollis  : 1945  MRN: [id number]  Referring provider: Ayla Mckee MD  Dx:   Encounter Diagnosis     ICD-10-CM    1. Benign paroxysmal positional vertigo due to bilateral vestibular disorder  H81.13                      Subjective: Pt reports very faint dizziness at baseline, continued feelings of sway and imbalance during functional mobility and ADLs but was able to complete yardwork over the weekend. He reports no improvement with performance of habituation prior to balance HEP.     Objective: See treatment diary below    Assessment: Pt demonstrated equivocal improvement in EC balance following Rep RET pt OP seated. Instructed pt to perform 4xD to further assess response.    Plan: Assess response nv.     Precautions: PMHx: HTN, CKD II  Dx: dizziness with a habituation treatment classification    Manuals 4/15 4/17 4/22 5/5 5/8 5/13 5/15 5/20 5/22                                                        Neuro Re-Ed             Postural correction and awareness training             Matthew duncan habituation 3 pillows  Slow up/  Slow down  X1 cycle    2 pillows  Slow up/  Slow down  X1 cycle    2 pillows  Slow up/  Quick down  X5 cycles 2 pillows  Quick down, slow up to R  4 cycles    2 pillow slow down, slow up to L  x4 cycles 2 pillows  Quick down, quick up to R  x4 cycles    2 pillow quick down, slow up to L  x4 cycles 2 pillow quick down, quick up to B  x4 cycles HEP        Rep T ext pt OP seated         3x10    Rep RET pt OP, ext seated         3x10    Rep RET pt OP seated         3x10                                           Rep RET, flex pt OP supine 1x15 1x15           DNF 10\"x10 10\"x10           FT EO    L=S  60\"x1    L7  60\"x1 L7  60\"x2 L7  60\"x2 L7  60\"x2 L7  60\"x2 L4  60\"x2    FT EC  nv L=S  60\"x2 L=S  60\"x2 L=S  60\"x2 L=S  60\"x2 L=S  60\"x2 L12  60\"x2 L=S  60\"x4    FT EO head turns  nv  L=S  60\"x1 L=S  60\"x1 L=S  60\"x1 " "L=S  60\"x1 L12  60\"x2 L10  60\"x1    FT EO head turns with gaze stab    L=S  60\"x1 L=S  60\"x1 L=S  60\"x1 L12  60\"x1 L12  60\"x1 L12  60\"x1    FT EO head nods     L=S  60\"x1 L=S  60\"x1 L12  60\"x1 L12  60\"x1 L10  60\"x1    FT EO head nobs with gaze stab     L=S  60\"x1 L10  60\"x1 L10  60\"x1 L10  60\"x1 L8  60\"x1    SLS EO  nv L=S  60\"x2 ea L=S  60\"x1 ea L=S  60\"x1 ea L=S  60\"x1 ea HEP      FT EO trumpet simulation      Floor  60\"x1    Foam  60\"x1 Foam  60\"x2 Foam  60\"x2 Foam  60\"x1                                                                                  Ther Ex                                                                                                                     Ther Activity             STS on foam     3x10                     Gait Training             Gait training with head turns    6x40 ft 6x40 ft 4x40 ft 6x40 ft 6x40 ft 6x40 ft    Gait training with head turns and gaze stab    2x40 ft 6x40 ft 4x40 ft 4x40 ft 4x40 ft 4x40 ft    Gait training with head nods and gaze stab    2x40 ft 6x40 ft 4x40 ft 4x40 ft 4x40 ft 4x40 ft    Gait training with head nods      4x40 ft 4x40 ft 4x40 ft 4x40 ft                 Modalities                                          "

## 2025-06-03 ENCOUNTER — APPOINTMENT (OUTPATIENT)
Dept: PHYSICAL THERAPY | Facility: CLINIC | Age: 80
End: 2025-06-03
Attending: INTERNAL MEDICINE
Payer: MEDICARE

## 2025-06-05 ENCOUNTER — OFFICE VISIT (OUTPATIENT)
Dept: PHYSICAL THERAPY | Facility: CLINIC | Age: 80
End: 2025-06-05
Attending: INTERNAL MEDICINE
Payer: MEDICARE

## 2025-06-05 DIAGNOSIS — H81.13 BENIGN PAROXYSMAL POSITIONAL VERTIGO DUE TO BILATERAL VESTIBULAR DISORDER: Primary | ICD-10-CM

## 2025-06-05 PROCEDURE — 97112 NEUROMUSCULAR REEDUCATION: CPT | Performed by: PHYSICAL THERAPIST

## 2025-06-05 PROCEDURE — 97140 MANUAL THERAPY 1/> REGIONS: CPT | Performed by: PHYSICAL THERAPIST

## 2025-06-05 NOTE — PROGRESS NOTES
"Daily Note     Today's date: 2025  Patient name: Franky Hollis  : 1945  MRN: [id number]  Referring provider: Ayla Mckee MD  Dx:   Encounter Diagnosis     ICD-10-CM    1. Benign paroxysmal positional vertigo due to bilateral vestibular disorder  H81.13                      Subjective: Pt reports lightheadedness and postural sway at baseline today following 3 days symptom-free. He notes improved symptoms overall with Rep RET pt OP.     Objective: See treatment diary below    Rep RET clin OP P,B (improved ROM and lightheadedness)    Assessment: Pt demonstrated improved D,B baseline symptoms and SLS EO following Rep RET clin OP but required increased recovery time following  more reps.    Plan: Cont POC. Add DNF nv, hold balance with head movement.     Precautions: PMHx: HTN, CKD II  Dx: dizziness with a habituation treatment classification    Manuals 4/15 4/17 4/22 5/5 5/8 5/13 5/15 5/20 5/22 6   Rep RET clin OP          3x10                                          Neuro Re-Ed             Postural correction and awareness training             Matthew duncan habituation 3 pillows  Slow up/  Slow down  X1 cycle    2 pillows  Slow up/  Slow down  X1 cycle    2 pillows  Slow up/  Quick down  X5 cycles 2 pillows  Quick down, slow up to R  4 cycles    2 pillow slow down, slow up to L  x4 cycles 2 pillows  Quick down, quick up to R  x4 cycles    2 pillow quick down, slow up to L  x4 cycles 2 pillow quick down, quick up to B  x4 cycles HEP        Rep T ext pt OP seated         3x10    Rep RET pt OP, ext seated         3x10    Rep RET pt OP seated         3x10 6x10                                          Rep RET, flex pt OP supine 1x15 1x15           DNF 10\"x10 10\"x10           FT EO    L=S  60\"x1    L7  60\"x1 L7  60\"x2 L7  60\"x2 L7  60\"x2 L7  60\"x2 L4  60\"x2 L4  60\"x2   FT EC  nv L=S  60\"x2 L=S  60\"x2 L=S  60\"x2 L=S  60\"x2 L=S  60\"x2 L12  60\"x2 L=S  60\"x4 L=S  60\"x1    L12  60\"x1   FT EO head " "turns  nv  L=S  60\"x1 L=S  60\"x1 L=S  60\"x1 L=S  60\"x1 L12  60\"x2 L10  60\"x1    FT EO head turns with gaze stab    L=S  60\"x1 L=S  60\"x1 L=S  60\"x1 L12  60\"x1 L12  60\"x1 L12  60\"x1    FT EO head nods     L=S  60\"x1 L=S  60\"x1 L12  60\"x1 L12  60\"x1 L10  60\"x1    FT EO head nobs with gaze stab     L=S  60\"x1 L10  60\"x1 L10  60\"x1 L10  60\"x1 L8  60\"x1    SLS EO  nv L=S  60\"x2 ea L=S  60\"x1 ea L=S  60\"x1 ea L=S  60\"x1 ea HEP   L=s  60\"x1 ea   FT EO trumpet simulation      Floor  60\"x1    Foam  60\"x1 Foam  60\"x2 Foam  60\"x2 Foam  60\"x1 nv                                                                                 Ther Ex                                                                                                                     Ther Activity             STS on foam     3x10                     Gait Training             Gait training with head turns    6x40 ft 6x40 ft 4x40 ft 6x40 ft 6x40 ft 6x40 ft    Gait training with head turns and gaze stab    2x40 ft 6x40 ft 4x40 ft 4x40 ft 4x40 ft 4x40 ft    Gait training with head nods and gaze stab    2x40 ft 6x40 ft 4x40 ft 4x40 ft 4x40 ft 4x40 ft    Gait training with head nods      4x40 ft 4x40 ft 4x40 ft 4x40 ft                 Modalities                                          "

## 2025-06-10 ENCOUNTER — OFFICE VISIT (OUTPATIENT)
Dept: PHYSICAL THERAPY | Facility: CLINIC | Age: 80
End: 2025-06-10
Attending: INTERNAL MEDICINE
Payer: MEDICARE

## 2025-06-10 DIAGNOSIS — H81.13 BENIGN PAROXYSMAL POSITIONAL VERTIGO DUE TO BILATERAL VESTIBULAR DISORDER: Primary | ICD-10-CM

## 2025-06-10 PROCEDURE — 97112 NEUROMUSCULAR REEDUCATION: CPT

## 2025-06-10 NOTE — PROGRESS NOTES
"Daily Note     Today's date: 6/10/2025  Patient name: Franky Hollis  : 1945  MRN: [id number]  Referring provider: Ayla Mckee MD  Dx:   Encounter Diagnosis     ICD-10-CM    1. Benign paroxysmal positional vertigo due to bilateral vestibular disorder  H81.13             Start Time: 1030  Stop Time: 1110  Total time in clinic (min): 40 minutes    Subjective: Patient reports no change since last visit. He states, \"I couldn't change my lightheadedness with the retractions over the weekend\"    Objective: See treatment diary below    Assessment: Patient required cueing to correct form for cervical retractions. Additionally, we modified his hand position for OP which helped as well. Gerson was able to decrease symptoms slightly with cervical retraction with self OP. Continues to be very challenged with balance exercises listed below.     Plan: Cont POC. Add DNF nv, hold balance with head movement.     Precautions: PMHx: HTN, CKD II  Dx: dizziness with a habituation treatment classification    Manuals 4/15 4/17 4/22 5/5 5/8 5/13 5/15 5/20 5/22 6 610   Rep RET clin OP          3x10                                              Neuro Re-Ed              Postural correction and awareness training              Matthew duncan habituation 3 pillows  Slow up/  Slow down  X1 cycle    2 pillows  Slow up/  Slow down  X1 cycle    2 pillows  Slow up/  Quick down  X5 cycles 2 pillows  Quick down, slow up to R  4 cycles    2 pillow slow down, slow up to L  x4 cycles 2 pillows  Quick down, quick up to R  x4 cycles    2 pillow quick down, slow up to L  x4 cycles 2 pillow quick down, quick up to B  x4 cycles HEP         Rep T ext pt OP seated         3x10     Rep RET pt OP, ext seated         3x10     Rep RET pt OP seated         3x10 6x10 6x10   DNF           NV                               Rep RET, flex pt OP supine 1x15 1x15            DNF 10\"x10 10\"x10            FT EO    L=S  60\"x1    L7  60\"x1 L7  60\"x2 L7  60\"x2 " "L7  60\"x2 L7  60\"x2 L4  60\"x2 L4  60\"x2 L4  60\"x2   FT EC  nv L=S  60\"x2 L=S  60\"x2 L=S  60\"x2 L=S  60\"x2 L=S  60\"x2 L12  60\"x2 L=S  60\"x4 L=S  60\"x1    L12  60\"x1 L=S  60\"x1    L12  60\"x1   FT EO head turns  nv  L=S  60\"x1 L=S  60\"x1 L=S  60\"x1 L=S  60\"x1 L12  60\"x2 L10  60\"x1     FT EO head turns with gaze stab    L=S  60\"x1 L=S  60\"x1 L=S  60\"x1 L12  60\"x1 L12  60\"x1 L12  60\"x1     FT EO head nods     L=S  60\"x1 L=S  60\"x1 L12  60\"x1 L12  60\"x1 L10  60\"x1     FT EO head nobs with gaze stab     L=S  60\"x1 L10  60\"x1 L10  60\"x1 L10  60\"x1 L8  60\"x1     SLS EO  nv L=S  60\"x2 ea L=S  60\"x1 ea L=S  60\"x1 ea L=S  60\"x1 ea HEP   L=s  60\"x1 ea L=S  60\"x1 ea   FT EO trumpet simulation      Floor  60\"x1    Foam  60\"x1 Foam  60\"x2 Foam  60\"x2 Foam  60\"x1 nv Foam  60\"x1                                                                                       Ther Ex                                                                                                                              Ther Activity              STS on foam     3x10                       Gait Training              Gait training with head turns    6x40 ft 6x40 ft 4x40 ft 6x40 ft 6x40 ft 6x40 ft     Gait training with head turns and gaze stab    2x40 ft 6x40 ft 4x40 ft 4x40 ft 4x40 ft 4x40 ft     Gait training with head nods and gaze stab    2x40 ft 6x40 ft 4x40 ft 4x40 ft 4x40 ft 4x40 ft     Gait training with head nods      4x40 ft 4x40 ft 4x40 ft 4x40 ft                   Modalities                                             "

## 2025-06-12 ENCOUNTER — OFFICE VISIT (OUTPATIENT)
Dept: PHYSICAL THERAPY | Facility: CLINIC | Age: 80
End: 2025-06-12
Attending: INTERNAL MEDICINE
Payer: MEDICARE

## 2025-06-12 DIAGNOSIS — H81.13 BENIGN PAROXYSMAL POSITIONAL VERTIGO DUE TO BILATERAL VESTIBULAR DISORDER: Primary | ICD-10-CM

## 2025-06-12 PROCEDURE — 97112 NEUROMUSCULAR REEDUCATION: CPT

## 2025-06-12 NOTE — PROGRESS NOTES
"Daily Note     Today's date: 2025  Patient name: Franky Hollis  : 1945  MRN: [id number]  Referring provider: Ayla Mckee MD  Dx:   Encounter Diagnosis     ICD-10-CM    1. Benign paroxysmal positional vertigo due to bilateral vestibular disorder  H81.13             Start Time: 1030  Stop Time: 1110  Total time in clinic (min): 40 minutes    Subjective: Patient reports his symptoms were no better than \"6/10\" with repeated movements. However, he thinks he isn't completing them correctly.     Objective: See treatment diary below    Assessment: Again modified hand position with good response. Able to decrease symptoms to \"5/10\" following therapy. Much better balance demonstrated today.     Plan: Cont POC. Hold balance with head movement.     Precautions: PMHx: HTN, CKD II  Dx: dizziness with a habituation treatment classification    Manuals 6/12      5/15 5/20 5/22 6/4 6/10   Rep RET clin OP          3x10                                              Neuro Re-Ed              Postural correction and awareness training              Matthew duncan habituation              Rep T ext pt OP seated         3x10     Rep RET pt OP, ext seated         3x10     Rep RET pt OP seated 7x10        3x10 6x10 6x10   DNF 5\"x10          NV                               Rep RET, flex pt OP supine              DNF              FT EO L4  60\"x2      L7  60\"x2 L7  60\"x2 L4  60\"x2 L4  60\"x2 L4  60\"x2   FT EC L=S  60\"x1    L12  60\"x1      L=S  60\"x2 L12  60\"x2 L=S  60\"x4 L=S  60\"x1    L12  60\"x1 L=S  60\"x1    L12  60\"x1   FT EO head turns       L=S  60\"x1 L12  60\"x2 L10  60\"x1     FT EO head turns with gaze stab       L12  60\"x1 L12  60\"x1 L12  60\"x1     FT EO head nods       L12  60\"x1 L12  60\"x1 L10  60\"x1     FT EO head nobs with gaze stab       L10  60\"x1 L10  60\"x1 L8  60\"x1     SLS EO L=S  60\"x2 ea      HEP   L=s  60\"x1 ea L=S  60\"x1 ea   FT EO trumpet simulation Foam  60\"x1      Foam  60\"x2 Foam  60\"x2 Foam  60\"x1 " "nv Foam  60\"x1                                                                                       Ther Ex                                                                                                                              Ther Activity              STS on foam                            Gait Training              Gait training with head turns       6x40 ft 6x40 ft 6x40 ft     Gait training with head turns and gaze stab       4x40 ft 4x40 ft 4x40 ft     Gait training with head nods and gaze stab       4x40 ft 4x40 ft 4x40 ft     Gait training with head nods       4x40 ft 4x40 ft 4x40 ft                   Modalities                                             "

## 2025-06-16 DIAGNOSIS — I10 ESSENTIAL HYPERTENSION: ICD-10-CM

## 2025-06-16 RX ORDER — VALSARTAN 40 MG/1
40 TABLET ORAL 2 TIMES DAILY
Qty: 180 TABLET | Refills: 1 | Status: SHIPPED | OUTPATIENT
Start: 2025-06-16

## 2025-06-17 ENCOUNTER — OFFICE VISIT (OUTPATIENT)
Dept: PHYSICAL THERAPY | Facility: CLINIC | Age: 80
End: 2025-06-17
Attending: INTERNAL MEDICINE
Payer: MEDICARE

## 2025-06-17 DIAGNOSIS — H81.13 BENIGN PAROXYSMAL POSITIONAL VERTIGO DUE TO BILATERAL VESTIBULAR DISORDER: Primary | ICD-10-CM

## 2025-06-17 DIAGNOSIS — R42 CERVICOGENIC DIZZINESS: ICD-10-CM

## 2025-06-17 PROCEDURE — 97112 NEUROMUSCULAR REEDUCATION: CPT | Performed by: PHYSICAL THERAPIST

## 2025-06-17 NOTE — PROGRESS NOTES
"Daily Note     Today's date: 2025  Patient name: Franky Hollis  : 1945  MRN: [id number]  Referring provider: Ayla Mckee MD  Dx:   Encounter Diagnosis     ICD-10-CM    1. Benign paroxysmal positional vertigo due to bilateral vestibular disorder  H81.13       2. Cervicogenic dizziness  R42                        Subjective: Pt reports no change overall in symptoms, c/o feelings of postural sway vs dizziness. Pt reports ability to nearly abolish with supine lying.    Objective: See treatment diary below    Rep RET pt OP seated NE,NE  Rep RET supine P,NW    Assessment: Pt demonstrates better response to supine vs sitting possible due to inability to sufficiently reach end ROM while sitting.    Plan: Assess response to Rep RET supine.     Precautions: PMHx: HTN, CKD II  Dx: dizziness with a habituation treatment classification    Manuals             Rep RET clin OP              Rep RET clin OP supine  2x10                                        Neuro Re-Ed              Postural correction and awareness training              Matthew duncan habituation              Rep T ext pt OP seated              Rep RET pt OP, ext seated              Rep RET pt OP seated 7x10 3x10            DNF 5\"x10 nv            Rep RET supine              Rep RET pt OP supine  4x10            Rep RET, flex pt OP supine              DNF              FT EO L4  60\"x2 L4  60\"x2            FT EC L=S  60\"x1    L12  60\"x1 L12  60\"x2            FT EO head turns              FT EO head turns with gaze stab              FT EO head nods              FT EO head nobs with gaze stab              SLS EO L=S  60\"x2 ea L=S  60\"x1 ea            FT EO trumpet simulation Foam  60\"x1                                                                                                 Ther Ex                                                                                                                              Ther Activity           "    STS on foam                            Gait Training              Gait training with head turns              Gait training with head turns and gaze stab              Gait training with head nods and gaze stab              Gait training with head nods                            Modalities

## 2025-06-19 ENCOUNTER — OFFICE VISIT (OUTPATIENT)
Dept: PHYSICAL THERAPY | Facility: CLINIC | Age: 80
End: 2025-06-19
Attending: INTERNAL MEDICINE
Payer: MEDICARE

## 2025-06-19 DIAGNOSIS — R42 CERVICOGENIC DIZZINESS: ICD-10-CM

## 2025-06-19 DIAGNOSIS — H81.13 BENIGN PAROXYSMAL POSITIONAL VERTIGO DUE TO BILATERAL VESTIBULAR DISORDER: Primary | ICD-10-CM

## 2025-06-19 PROCEDURE — 97112 NEUROMUSCULAR REEDUCATION: CPT

## 2025-06-19 NOTE — PROGRESS NOTES
"Daily Note     Today's date: 2025  Patient name: Franky Hollis  : 1945  MRN: [id number]  Referring provider: Ayla Mckee MD  Dx:   Encounter Diagnosis     ICD-10-CM    1. Benign paroxysmal positional vertigo due to bilateral vestibular disorder  H81.13       2. Cervicogenic dizziness  R42         Start Time: 1035  Stop Time: 1115  Total time in clinic (min): 40 minutes    Subjective: Patient states, \"My balance might be a little better but the lightheadedness is the same\". He notes the recovery time has improved following cervical retractions.    Objective: See treatment diary below  Rep RET supine P,NW (improving recovery times)    Assessment: No significant change in balance following rep retractions. SLS has improved since starting therapy.     Plan: Assess response to Rep RET supine.     Precautions: PMHx: HTN, CKD II  Dx: dizziness with a habituation treatment classification    Manuals            Rep RET clin OP              Rep RET clin OP supine  2x10 2x15  WS                                       Neuro Re-Ed              Postural correction and awareness training              Matthew duncan habituation              Rep T ext pt OP seated              Rep RET pt OP, ext seated              Rep RET pt OP seated 7x10 3x10            DNF 5\"x10 nv 10\"x10           Rep RET supine              Rep RET pt OP supine  4x10 4x10           Rep RET, flex pt OP supine              DNF              FT EO L4  60\"x2 L4  60\"x2 L4  60\"x2           FT EC L=S  60\"x1    L12  60\"x1 L12  60\"x2 L12  60\"x2           FT EO head turns              FT EO head turns with gaze stab              FT EO head nods              FT EO head nobs with gaze stab              SLS EO L=S  60\"x2 ea L=S  60\"x1 ea L=S  60\"x1 ea           FT EO trumpet simulation Foam  60\"x1                                                                                                 Ther Ex                                      "                                                                                         Ther Activity              STS on foam                            Gait Training              Gait training with head turns              Gait training with head turns and gaze stab              Gait training with head nods and gaze stab              Gait training with head nods                            Modalities

## 2025-06-24 ENCOUNTER — OFFICE VISIT (OUTPATIENT)
Dept: PHYSICAL THERAPY | Facility: CLINIC | Age: 80
End: 2025-06-24
Attending: INTERNAL MEDICINE
Payer: MEDICARE

## 2025-06-24 DIAGNOSIS — H81.13 BENIGN PAROXYSMAL POSITIONAL VERTIGO DUE TO BILATERAL VESTIBULAR DISORDER: Primary | ICD-10-CM

## 2025-06-24 DIAGNOSIS — R42 CERVICOGENIC DIZZINESS: ICD-10-CM

## 2025-06-24 PROCEDURE — 97140 MANUAL THERAPY 1/> REGIONS: CPT | Performed by: PHYSICAL THERAPIST

## 2025-06-24 PROCEDURE — 97112 NEUROMUSCULAR REEDUCATION: CPT | Performed by: PHYSICAL THERAPIST

## 2025-06-24 NOTE — PROGRESS NOTES
"Daily Note     Today's date: 2025  Patient name: Franky Hollis  : 1945  MRN: [id number]  Referring provider: Ayla Mckee MD  Dx:   Encounter Diagnosis     ICD-10-CM    1. Benign paroxysmal positional vertigo due to bilateral vestibular disorder  H81.13       2. Cervicogenic dizziness  R42                    Subjective: Pt reports 0/10 dizziness lying supine, 5/10 at baseline when sitting or standing.    Objective: See treatment diary below    Rep RET supine pt OP NE, NE  Rep RET supine clin OP P,NW  Rep RET, rotation B seated P,NW    Assessment: Pt continues to demonstrate NE,NE or P,NW with upper c/s ROM indicating dysfunction vs derangement.     Plan: Cont POC.     Precautions: PMHx: HTN, CKD II  Dx: dizziness with a habituation treatment classification    Manuals           Rep RET clin OP              Rep RET clin OP supine  2x10 2x15  WS 3x10                                      Neuro Re-Ed              Postural correction and awareness training              Matthew duncan habituation              Rep T ext pt OP seated              Rep RET pt OP, ext seated              Rep RET pt OP seated 7x10 3x10            DNF 5\"x10 nv 10\"x10 10\"x10          Rep RET supine              Rep RET pt OP supine  4x10 4x10 Hands vs towel  3x10          Rep RET, rotation seated    1x10 ea          DNF              FT EO L4  60\"x2 L4  60\"x2 L4  60\"x2 L4  60\"x2          FT EC L=S  60\"x1    L12  60\"x1 L12  60\"x2 L12  60\"x2 L12  60\"x2          FT EO head turns              FT EO head turns with gaze stab              FT EO head nods              FT EO head nobs with gaze stab              SLS EO L=S  60\"x2 ea L=S  60\"x1 ea L=S  60\"x1 ea L=S  60\"x1 ea          FT EO trumpet simulation Foam  60\"x1                                                                                                 Ther Ex                                                                                              "                                 Ther Activity              STS on foam                            Gait Training              Gait training with head turns              Gait training with head turns and gaze stab              Gait training with head nods and gaze stab              Gait training with head nods                            Modalities

## 2025-06-26 ENCOUNTER — OFFICE VISIT (OUTPATIENT)
Dept: PHYSICAL THERAPY | Facility: CLINIC | Age: 80
End: 2025-06-26
Attending: INTERNAL MEDICINE
Payer: MEDICARE

## 2025-06-26 DIAGNOSIS — R42 CERVICOGENIC DIZZINESS: ICD-10-CM

## 2025-06-26 DIAGNOSIS — H81.13 BENIGN PAROXYSMAL POSITIONAL VERTIGO DUE TO BILATERAL VESTIBULAR DISORDER: Primary | ICD-10-CM

## 2025-06-26 PROCEDURE — 97112 NEUROMUSCULAR REEDUCATION: CPT | Performed by: PHYSICAL THERAPIST

## 2025-06-26 NOTE — PROGRESS NOTES
"Daily Note     Today's date: 2025  Patient name: Franky Hollis  : 1945  MRN: [id number]  Referring provider: Ayla Mckee MD  Dx:   Encounter Diagnosis     ICD-10-CM    1. Benign paroxysmal positional vertigo due to bilateral vestibular disorder  H81.13       2. Cervicogenic dizziness  R42                    Subjective: Pt reports 0/10 dizziness lying supine, 5/10 at baseline when sitting or standing. He reports increased symptoms all day the next day following last visit now returned to baseline.    Objective: See treatment diary below    Rep RET supine pt OP NE, NE  Rep RET supine clin OP P,NW  Rep RET, rotation B seated P,NW    Assessment: Pt continues to demonstrate NE,NE or P,NW with upper c/s ROM indicating dysfunction vs derangement.     Plan: Cont POC.     Precautions: PMHx: HTN, CKD II  Dx: dizziness with a habituation treatment classification    Manuals          Rep RET clin OP              Rep RET clin OP supine  2x10 2x15  WS 3x10 3x10                                     Neuro Re-Ed              Postural correction and awareness training              Matthew duncan habituation              Rep T ext pt OP seated              Rep RET pt OP, ext seated              Rep RET pt OP seated 7x10 3x10            DNF 5\"x10 nv 10\"x10 10\"x10 10\"x10         Rep RET supine              Rep RET pt OP supine  4x10 4x10 Hands vs towel  3x10 Hands vs towel  3x10         Rep RET, rotation seated    1x10 ea 1x10 ea         DNF              FT EO L4  60\"x2 L4  60\"x2 L4  60\"x2 L4  60\"x2 L4  60\"x2         FT EC L=S  60\"x1    L12  60\"x1 L12  60\"x2 L12  60\"x2 L12  60\"x2 L12  60\"x2         FT EO head turns              FT EO head turns with gaze stab              FT EO head nods              FT EO head nobs with gaze stab              SLS EO L=S  60\"x2 ea L=S  60\"x1 ea L=S  60\"x1 ea L=S  60\"x1 ea L=S  60\"x1 ea         FT EO trumpet simulation Foam  60\"x1                         "                                                                         Ther Ex                                                                                                                              Ther Activity              STS on foam                            Gait Training              Gait training with head turns              Gait training with head turns and gaze stab              Gait training with head nods and gaze stab              Gait training with head nods                            Modalities

## 2025-07-03 ENCOUNTER — OFFICE VISIT (OUTPATIENT)
Dept: PHYSICAL THERAPY | Facility: CLINIC | Age: 80
End: 2025-07-03
Attending: INTERNAL MEDICINE
Payer: MEDICARE

## 2025-07-03 DIAGNOSIS — H81.13 BENIGN PAROXYSMAL POSITIONAL VERTIGO DUE TO BILATERAL VESTIBULAR DISORDER: Primary | ICD-10-CM

## 2025-07-03 DIAGNOSIS — R42 CERVICOGENIC DIZZINESS: ICD-10-CM

## 2025-07-03 PROCEDURE — 97112 NEUROMUSCULAR REEDUCATION: CPT | Performed by: PHYSICAL THERAPIST

## 2025-07-03 NOTE — PROGRESS NOTES
"Daily Note     Today's date: 7/3/2025  Patient name: Franky Hollis  : 1945  MRN: [id number]  Referring provider: Ayla Mckee MD  Dx:   Encounter Diagnosis     ICD-10-CM    1. Benign paroxysmal positional vertigo due to bilateral vestibular disorder  H81.13       2. Cervicogenic dizziness  R42                    Subjective: Pt reports 0/10 dizziness lying supine, 5/10 at baseline when sitting or standing. He reports poor compliance over the past 2 days due to his wife being in the hospital.    Objective: See treatment diary below    Rep RET supine pt OP NE,NE  Rep RET supine clin OP NE,NE  Rep RET, rotation B seated P,NW    Assessment: Pt demonstrates less provocation of symptoms with OP and improved upper c/s ROM overall however has not substantially changed his dizziness or balance.    Plan: Cont POC.     Precautions: PMHx: HTN, CKD II  Dx: dizziness with a habituation treatment classification    Manuals 6/12 6/17 6/19 6/24 6/26 7/3        Rep RET clin OP              Rep RET clin OP supine  2x10 2x15  WS 3x10 3x10 3x10                                    Neuro Re-Ed              Postural correction and awareness training              Matthew duncan habituation              Rep T ext pt OP seated              Rep RET pt OP, ext seated              Rep RET pt OP seated 7x10 3x10            DNF 5\"x10 nv 10\"x10 10\"x10 10\"x10 10\"x10        Rep RET supine              Rep RET pt OP supine  4x10 4x10 Hands vs towel  3x10 Hands vs towel  3x10 Hands vs towel  3x10        Rep RET, rotation seated    1x10 ea 1x10 ea 3x10 ea        DNF              FT EO L4  60\"x2 L4  60\"x2 L4  60\"x2 L4  60\"x2 L4  60\"x2 L4  60\"x2        FT EC L=S  60\"x1    L12  60\"x1 L12  60\"x2 L12  60\"x2 L12  60\"x2 L12  60\"x2 L=S  60\"x2        FT EO head turns              FT EO head turns with gaze stab              FT EO head nods              FT EO head nobs with gaze stab              SLS EO L=S  60\"x2 ea L=S  60\"x1 ea L=S  60\"x1 ea " "L=S  60\"x1 ea L=S  60\"x1 ea L=S  60\"x1 ea        FT EO trumpet simulation Foam  60\"x1                                                                                                 Ther Ex                                                                                                                              Ther Activity              STS on foam                            Gait Training              Gait training with head turns              Gait training with head turns and gaze stab              Gait training with head nods and gaze stab              Gait training with head nods                            Modalities                                             "

## 2025-07-08 ENCOUNTER — OFFICE VISIT (OUTPATIENT)
Dept: PHYSICAL THERAPY | Facility: CLINIC | Age: 80
End: 2025-07-08
Attending: INTERNAL MEDICINE
Payer: MEDICARE

## 2025-07-08 DIAGNOSIS — H81.13 BENIGN PAROXYSMAL POSITIONAL VERTIGO DUE TO BILATERAL VESTIBULAR DISORDER: Primary | ICD-10-CM

## 2025-07-08 DIAGNOSIS — H81.319 AUDITORY VERTIGO, UNSPECIFIED LATERALITY: ICD-10-CM

## 2025-07-08 DIAGNOSIS — R42 CERVICOGENIC DIZZINESS: ICD-10-CM

## 2025-07-08 DIAGNOSIS — E78.2 MIXED HYPERLIPIDEMIA: ICD-10-CM

## 2025-07-08 PROCEDURE — 97140 MANUAL THERAPY 1/> REGIONS: CPT | Performed by: PHYSICAL THERAPIST

## 2025-07-08 PROCEDURE — 97112 NEUROMUSCULAR REEDUCATION: CPT | Performed by: PHYSICAL THERAPIST

## 2025-07-08 NOTE — PROGRESS NOTES
"Daily Note     Today's date: 2025  Patient name: Franky Hollis  : 1945  MRN: [id number]  Referring provider: Ayla Mckee MD  Dx:   Encounter Diagnosis     ICD-10-CM    1. Benign paroxysmal positional vertigo due to bilateral vestibular disorder  H81.13       2. Cervicogenic dizziness  R42                    Subjective: Pt reports no change in dizziness since last visit.    Objective: See treatment diary below    Smooth pursuits FROM, intermittent saccadic movement H>V, P,NW  Gaze stabilization unremarkable slow, P,NW  VOR cx mild hypermetria B, P,NW  C1/2 B SNAGs P,NW    Updated HEP    Assessment: Pt demonstrated oculomotor impairments and further P,NW with upper cervical mobilization.    Plan: Assess response nv.     Precautions: PMHx: HTN, CKD II  Dx: dizziness with a habituation treatment classification    Manuals 6/12 6/17 6/19 6/24 6/26 7/3 7/8       Rep RET clin OP              Rep RET clin OP supine  2x10 2x15  WS 3x10 3x10 3x10        C1/2 SNAGs       1x10 ea                     Neuro Re-Ed              Postural correction and awareness training              Matthew duncan habituation              Rep T ext pt OP seated              Rep RET pt OP, ext seated              Rep RET pt OP seated 7x10 3x10            DNF 5\"x10 nv 10\"x10 10\"x10 10\"x10 10\"x10 HEP       Rep RET supine              Rep RET pt OP supine  4x10 4x10 Hands vs towel  3x10 Hands vs towel  3x10 Hands vs towel  3x10        Rep RET, rotation seated    1x10 ea 1x10 ea 3x10 ea 3x10 ea       C1/2 SNAGs       1x10 ea                     FT EO L4  60\"x2 L4  60\"x2 L4  60\"x2 L4  60\"x2 L4  60\"x2 L4  60\"x2        FT EC L=S  60\"x1    L12  60\"x1 L12  60\"x2 L12  60\"x2 L12  60\"x2 L12  60\"x2 L=S  60\"x2        Smooth pursuits horizontal       Line tracing    x3       Gaze stabilization horizontal       45\"x2       VOR cx horizontal       nv                                   SLS EO L=S  60\"x2 ea L=S  60\"x1 ea L=S  60\"x1 ea " "L=S  60\"x1 ea L=S  60\"x1 ea L=S  60\"x1 ea        FT EO trumpet simulation Foam  60\"x1             Smooth pursuits: line tracking              Gaze stabilization horizontal              VOR cs                                                         Ther Ex                                                                                                                              Ther Activity              STS on foam                            Gait Training              Gait training with head turns              Gait training with head turns and gaze stab              Gait training with head nods and gaze stab              Gait training with head nods                            Modalities                                             "

## 2025-07-09 DIAGNOSIS — E78.2 MIXED HYPERLIPIDEMIA: ICD-10-CM

## 2025-07-09 DIAGNOSIS — R42 DIZZINESS: ICD-10-CM

## 2025-07-09 DIAGNOSIS — H81.319 AUDITORY VERTIGO, UNSPECIFIED LATERALITY: Primary | ICD-10-CM

## 2025-07-09 DIAGNOSIS — K21.9 LARYNGOPHARYNGEAL REFLUX (LPR): ICD-10-CM

## 2025-07-09 DIAGNOSIS — K21.9 GASTROESOPHAGEAL REFLUX DISEASE WITHOUT ESOPHAGITIS: ICD-10-CM

## 2025-07-09 RX ORDER — TRIAMTERENE AND HYDROCHLOROTHIAZIDE 37.5; 25 MG/1; MG/1
1 CAPSULE ORAL DAILY
Qty: 90 CAPSULE | Refills: 3 | OUTPATIENT
Start: 2025-07-09

## 2025-07-09 RX ORDER — PRAVASTATIN SODIUM 20 MG
20 TABLET ORAL DAILY
Qty: 90 TABLET | Refills: 3 | OUTPATIENT
Start: 2025-07-09

## 2025-07-09 RX ORDER — PRAVASTATIN SODIUM 20 MG
20 TABLET ORAL DAILY
Qty: 90 TABLET | Refills: 1 | Status: SHIPPED | OUTPATIENT
Start: 2025-07-09

## 2025-07-09 RX ORDER — TRIAMTERENE AND HYDROCHLOROTHIAZIDE 37.5; 25 MG/1; MG/1
1 CAPSULE ORAL DAILY
Qty: 90 CAPSULE | Refills: 1 | Status: SHIPPED | OUTPATIENT
Start: 2025-07-09

## 2025-07-10 ENCOUNTER — OFFICE VISIT (OUTPATIENT)
Dept: PHYSICAL THERAPY | Facility: CLINIC | Age: 80
End: 2025-07-10
Attending: INTERNAL MEDICINE
Payer: MEDICARE

## 2025-07-10 DIAGNOSIS — H81.13 BENIGN PAROXYSMAL POSITIONAL VERTIGO DUE TO BILATERAL VESTIBULAR DISORDER: Primary | ICD-10-CM

## 2025-07-10 DIAGNOSIS — R42 CERVICOGENIC DIZZINESS: ICD-10-CM

## 2025-07-10 PROCEDURE — 97112 NEUROMUSCULAR REEDUCATION: CPT | Performed by: PHYSICAL THERAPIST

## 2025-07-10 NOTE — PROGRESS NOTES
"Daily Note     Today's date: 7/10/2025  Patient name: Franky Hollis  : 1945  MRN: [id number]  Referring provider: Ayla Mckee MD  Dx:   Encounter Diagnosis     ICD-10-CM    1. Benign paroxysmal positional vertigo due to bilateral vestibular disorder  H81.13       2. Cervicogenic dizziness  R42                    Subjective: Pt reports P,NW with oculomotor exercises.    Objective: See treatment diary below    Assessment: Pt demonstrated P,NW with oculomotor progressions.    Plan: Assess response nv.     Precautions: PMHx: HTN, CKD II  Dx: dizziness with a habituation treatment classification    Manuals 6/12 6/17 6/19 6/24 6/26 7/3 7/8 7/10      Rep RET clin OP              Rep RET clin OP supine  2x10 2x15  WS 3x10 3x10 3x10        C1/2 SNAGs       1x10 ea 1x5 ea                    Neuro Re-Ed              Postural correction and awareness training              Matthew duncan habituation              Rep T ext pt OP seated        3x10      Rep RET pt OP, ext seated              Rep RET pt OP seated 7x10 3x10      3x10      DNF 5\"x10 nv 10\"x10 10\"x10 10\"x10 10\"x10 HEP       Rep RET supine              Rep RET pt OP supine  4x10 4x10 Hands vs towel  3x10 Hands vs towel  3x10 Hands vs towel  3x10        Rep RET, rotation seated    1x10 ea 1x10 ea 3x10 ea 3x10 ea 3x10 ea      C1/2 SNAGs       1x10 ea HEP                    FT EO L4  60\"x2 L4  60\"x2 L4  60\"x2 L4  60\"x2 L4  60\"x2 L4  60\"x2        FT EC L=S  60\"x1    L12  60\"x1 L12  60\"x2 L12  60\"x2 L12  60\"x2 L12  60\"x2 L=S  60\"x2  L=S  60\"x1      Smooth pursuits horizontal       Line tracing    x3 Line tracing    x5      Gaze stabilization horizontal       45\"x2 45\"x3      VOR cx horizontal       nv 45\"x3                                  SLS EO L=S  60\"x2 ea L=S  60\"x1 ea L=S  60\"x1 ea L=S  60\"x1 ea L=S  60\"x1 ea L=S  60\"x1 ea  L=S  60\"x1 ea L12     FT EO trumpet simulation Foam  60\"x1             Smooth pursuits: line tracking              Gaze " stabilization horizontal              VOR cs                                                         Ther Ex                                                                                                                              Ther Activity              STS on foam                            Gait Training              Gait training with head turns              Gait training with head turns and gaze stab              Gait training with head nods and gaze stab              Gait training with head nods                            Modalities

## 2025-07-15 ENCOUNTER — OFFICE VISIT (OUTPATIENT)
Dept: PHYSICAL THERAPY | Facility: CLINIC | Age: 80
End: 2025-07-15
Attending: INTERNAL MEDICINE
Payer: MEDICARE

## 2025-07-15 DIAGNOSIS — H81.13 BENIGN PAROXYSMAL POSITIONAL VERTIGO DUE TO BILATERAL VESTIBULAR DISORDER: Primary | ICD-10-CM

## 2025-07-15 DIAGNOSIS — R42 CERVICOGENIC DIZZINESS: ICD-10-CM

## 2025-07-15 PROCEDURE — 97112 NEUROMUSCULAR REEDUCATION: CPT | Performed by: PHYSICAL THERAPIST

## 2025-07-17 ENCOUNTER — APPOINTMENT (OUTPATIENT)
Dept: PHYSICAL THERAPY | Facility: CLINIC | Age: 80
End: 2025-07-17
Attending: INTERNAL MEDICINE
Payer: MEDICARE

## 2025-07-22 ENCOUNTER — OFFICE VISIT (OUTPATIENT)
Dept: PHYSICAL THERAPY | Facility: CLINIC | Age: 80
End: 2025-07-22
Attending: INTERNAL MEDICINE
Payer: MEDICARE

## 2025-07-22 DIAGNOSIS — R42 CERVICOGENIC DIZZINESS: ICD-10-CM

## 2025-07-22 DIAGNOSIS — H81.13 BENIGN PAROXYSMAL POSITIONAL VERTIGO DUE TO BILATERAL VESTIBULAR DISORDER: Primary | ICD-10-CM

## 2025-07-22 PROCEDURE — 97112 NEUROMUSCULAR REEDUCATION: CPT | Performed by: PHYSICAL THERAPIST

## 2025-07-22 NOTE — PROGRESS NOTES
"Daily Note     Today's date: 2025  Patient name: Franky Hollis  : 1945  MRN: [id number]  Referring provider: Ayla Mckee MD  Dx:   Encounter Diagnosis     ICD-10-CM    1. Benign paroxysmal positional vertigo due to bilateral vestibular disorder  H81.13       2. Cervicogenic dizziness  R42                    Subjective: Pt reports having a particularly bad day over the weekend for unknown reasons. He states he checked his blood pressure in different positions at different time without any significantly abnormal readings.    Objective: See treatment diary below    Assessment: Pt demonstrated NE,NE with oculomotor progressions.    Plan: Cont POC.     Precautions: PMHx: HTN, CKD II  Dx: dizziness with cervicogenic and oculomotor components    Manuals 6/12 6/17 6/19 6/24 6/26 7/3 7/8 7/10 7/15 7/22   Rep RET clin OP             Rep RET clin OP supine  2x10 2x15  WS 3x10 3x10 3x10       C1/2 SNAGs       1x10 ea 1x5 ea 1x5 ea 1x5 ea                Neuro Re-Ed             Postural correction and awareness training             Matthew duncan habituation             Rep T ext pt OP seated        3x10 3x10 held   Rep RET pt OP, ext seated             Rep RET pt OP seated 7x10 3x10      3x10 3x10 3x10   DNF 5\"x10 nv 10\"x10 10\"x10 10\"x10 10\"x10 HEP      Rep RET supine             Rep RET pt OP supine  4x10 4x10 Hands vs towel  3x10 Hands vs towel  3x10 Hands vs towel  3x10       Rep RET, rotation seated    1x10 ea 1x10 ea 3x10 ea 3x10 ea 3x10 ea 3x10 ea 3x10 ea   C1/2 SNAGs       1x10 ea HEP                  FT EO L4  60\"x2 L4  60\"x2 L4  60\"x2 L4  60\"x2 L4  60\"x2 L4  60\"x2       FT EC L=S  60\"x1    L12  60\"x1 L12  60\"x2 L12  60\"x2 L12  60\"x2 L12  60\"x2 L=S  60\"x2  L=S  60\"x1 L=S  60\"x1    L12  60\"x1 L=S  60\"x1    L12  60\"x1   Smooth pursuits horizontal       Line tracing    x3 Line tracing    x5 Line tracing    x5 Line tracing narrow space    x3  (x5 nv)   Gaze stabilization horizontal       45\"x2 " "45\"x3 45\"x3 + busy background    45\"x3 ea   VOR cx horizontal       nv 45\"x3 45\"x3 + busy background    45\"x3 ea                             SLS EO L=S  60\"x2 ea L=S  60\"x1 ea L=S  60\"x1 ea L=S  60\"x1 ea L=S  60\"x1 ea L=S  60\"x1 ea  L=S  60\"x1 ea L12  60\"x1 ea L12  60\"x2 ea   FT EO trumpet simulation Foam  60\"x1            Smooth pursuits: line tracking             Gaze stabilization horizontal             VOR cs                                                     Ther Ex                                                                                                                     Ther Activity             STS on foam                          Gait Training             Gait training with head turns             Gait training with head turns and gaze stab             Gait training with head nods and gaze stab             Gait training with head nods                          Modalities                                          "

## 2025-07-24 ENCOUNTER — OFFICE VISIT (OUTPATIENT)
Dept: PHYSICAL THERAPY | Facility: CLINIC | Age: 80
End: 2025-07-24
Attending: INTERNAL MEDICINE
Payer: MEDICARE

## 2025-07-24 DIAGNOSIS — R42 CERVICOGENIC DIZZINESS: ICD-10-CM

## 2025-07-24 DIAGNOSIS — H81.13 BENIGN PAROXYSMAL POSITIONAL VERTIGO DUE TO BILATERAL VESTIBULAR DISORDER: Primary | ICD-10-CM

## 2025-07-24 PROCEDURE — 97112 NEUROMUSCULAR REEDUCATION: CPT | Performed by: PHYSICAL THERAPIST

## 2025-07-29 ENCOUNTER — OFFICE VISIT (OUTPATIENT)
Dept: PHYSICAL THERAPY | Facility: CLINIC | Age: 80
End: 2025-07-29
Attending: INTERNAL MEDICINE
Payer: MEDICARE

## 2025-07-29 DIAGNOSIS — R42 CERVICOGENIC DIZZINESS: ICD-10-CM

## 2025-07-29 DIAGNOSIS — H81.13 BENIGN PAROXYSMAL POSITIONAL VERTIGO DUE TO BILATERAL VESTIBULAR DISORDER: Primary | ICD-10-CM

## 2025-07-29 PROCEDURE — 97112 NEUROMUSCULAR REEDUCATION: CPT | Performed by: PHYSICAL THERAPIST

## 2025-07-31 ENCOUNTER — OFFICE VISIT (OUTPATIENT)
Dept: PHYSICAL THERAPY | Facility: CLINIC | Age: 80
End: 2025-07-31
Attending: INTERNAL MEDICINE
Payer: MEDICARE

## 2025-07-31 DIAGNOSIS — R42 CERVICOGENIC DIZZINESS: ICD-10-CM

## 2025-07-31 DIAGNOSIS — H81.13 BENIGN PAROXYSMAL POSITIONAL VERTIGO DUE TO BILATERAL VESTIBULAR DISORDER: Primary | ICD-10-CM

## 2025-07-31 PROCEDURE — 97112 NEUROMUSCULAR REEDUCATION: CPT | Performed by: PHYSICAL THERAPIST

## 2025-08-05 ENCOUNTER — OFFICE VISIT (OUTPATIENT)
Dept: PHYSICAL THERAPY | Facility: CLINIC | Age: 80
End: 2025-08-05
Attending: INTERNAL MEDICINE
Payer: MEDICARE

## 2025-08-05 DIAGNOSIS — R42 CERVICOGENIC DIZZINESS: Primary | ICD-10-CM

## 2025-08-05 PROCEDURE — 97140 MANUAL THERAPY 1/> REGIONS: CPT | Performed by: PHYSICAL THERAPIST

## 2025-08-05 PROCEDURE — 97112 NEUROMUSCULAR REEDUCATION: CPT | Performed by: PHYSICAL THERAPIST

## 2025-08-07 ENCOUNTER — OFFICE VISIT (OUTPATIENT)
Dept: PHYSICAL THERAPY | Facility: CLINIC | Age: 80
End: 2025-08-07
Attending: INTERNAL MEDICINE
Payer: MEDICARE

## 2025-08-07 DIAGNOSIS — R42 CERVICOGENIC DIZZINESS: Primary | ICD-10-CM

## 2025-08-07 DIAGNOSIS — H81.13 BENIGN PAROXYSMAL POSITIONAL VERTIGO DUE TO BILATERAL VESTIBULAR DISORDER: ICD-10-CM

## 2025-08-07 PROCEDURE — 97112 NEUROMUSCULAR REEDUCATION: CPT | Performed by: PHYSICAL THERAPIST

## 2025-08-12 ENCOUNTER — OFFICE VISIT (OUTPATIENT)
Dept: PHYSICAL THERAPY | Facility: CLINIC | Age: 80
End: 2025-08-12
Attending: INTERNAL MEDICINE
Payer: MEDICARE

## 2025-08-14 ENCOUNTER — OFFICE VISIT (OUTPATIENT)
Dept: PHYSICAL THERAPY | Facility: CLINIC | Age: 80
End: 2025-08-14
Attending: INTERNAL MEDICINE
Payer: MEDICARE

## 2025-08-19 ENCOUNTER — OFFICE VISIT (OUTPATIENT)
Dept: PHYSICAL THERAPY | Facility: CLINIC | Age: 80
End: 2025-08-19
Attending: INTERNAL MEDICINE
Payer: MEDICARE

## 2025-08-19 DIAGNOSIS — H81.13 BENIGN PAROXYSMAL POSITIONAL VERTIGO DUE TO BILATERAL VESTIBULAR DISORDER: ICD-10-CM

## 2025-08-19 DIAGNOSIS — R42 CERVICOGENIC DIZZINESS: Primary | ICD-10-CM

## 2025-08-19 PROCEDURE — 97112 NEUROMUSCULAR REEDUCATION: CPT | Performed by: PHYSICAL THERAPIST

## 2025-08-21 ENCOUNTER — OFFICE VISIT (OUTPATIENT)
Dept: PHYSICAL THERAPY | Facility: CLINIC | Age: 80
End: 2025-08-21
Attending: INTERNAL MEDICINE
Payer: MEDICARE

## 2025-08-21 DIAGNOSIS — R42 CERVICOGENIC DIZZINESS: Primary | ICD-10-CM

## 2025-08-21 DIAGNOSIS — H81.13 BENIGN PAROXYSMAL POSITIONAL VERTIGO DUE TO BILATERAL VESTIBULAR DISORDER: ICD-10-CM

## 2025-08-21 PROCEDURE — 97112 NEUROMUSCULAR REEDUCATION: CPT | Performed by: PHYSICAL THERAPIST
